# Patient Record
Sex: MALE | Race: WHITE | Employment: OTHER | URBAN - METROPOLITAN AREA
[De-identification: names, ages, dates, MRNs, and addresses within clinical notes are randomized per-mention and may not be internally consistent; named-entity substitution may affect disease eponyms.]

---

## 2017-03-01 ENCOUNTER — HOSPITAL ENCOUNTER (OUTPATIENT)
Dept: OTHER | Age: 72
Discharge: HOME | End: 2017-03-31
Attending: INTERNAL MEDICINE | Admitting: INTERNAL MEDICINE

## 2017-04-20 LAB
AVERAGE GLUCOSE: NORMAL
HBA1C MFR BLD: 6.2 %

## 2017-09-28 PROBLEM — N17.9 AKI (ACUTE KIDNEY INJURY) (HCC): Status: ACTIVE | Noted: 2017-09-28

## 2017-12-27 ENCOUNTER — TELEPHONE (OUTPATIENT)
Dept: INTERNAL MEDICINE CLINIC | Age: 72
End: 2017-12-27

## 2018-04-06 ENCOUNTER — OFFICE VISIT (OUTPATIENT)
Dept: INTERNAL MEDICINE CLINIC | Age: 73
End: 2018-04-06

## 2018-04-06 VITALS
HEIGHT: 68 IN | HEART RATE: 80 BPM | DIASTOLIC BLOOD PRESSURE: 72 MMHG | WEIGHT: 201 LBS | SYSTOLIC BLOOD PRESSURE: 120 MMHG | BODY MASS INDEX: 30.46 KG/M2

## 2018-04-06 DIAGNOSIS — E03.4 HYPOTHYROIDISM DUE TO ACQUIRED ATROPHY OF THYROID: ICD-10-CM

## 2018-04-06 DIAGNOSIS — E11.22 TYPE 2 DIABETES MELLITUS WITH STAGE 3 CHRONIC KIDNEY DISEASE, WITHOUT LONG-TERM CURRENT USE OF INSULIN (HCC): Primary | ICD-10-CM

## 2018-04-06 DIAGNOSIS — C61 PROSTATE CANCER (HCC): ICD-10-CM

## 2018-04-06 DIAGNOSIS — N18.30 TYPE 2 DIABETES MELLITUS WITH STAGE 3 CHRONIC KIDNEY DISEASE, WITHOUT LONG-TERM CURRENT USE OF INSULIN (HCC): Primary | ICD-10-CM

## 2018-04-06 DIAGNOSIS — E66.09 CLASS 1 OBESITY DUE TO EXCESS CALORIES WITH SERIOUS COMORBIDITY AND BODY MASS INDEX (BMI) OF 30.0 TO 30.9 IN ADULT: ICD-10-CM

## 2018-04-06 DIAGNOSIS — I25.10 CORONARY ARTERY DISEASE INVOLVING NATIVE CORONARY ARTERY OF NATIVE HEART WITHOUT ANGINA PECTORIS: ICD-10-CM

## 2018-04-06 DIAGNOSIS — R25.1 TREMOR: ICD-10-CM

## 2018-04-06 DIAGNOSIS — E78.00 PURE HYPERCHOLESTEROLEMIA: ICD-10-CM

## 2018-04-06 DIAGNOSIS — Z86.73 HISTORY OF CVA (CEREBROVASCULAR ACCIDENT): ICD-10-CM

## 2018-04-06 PROBLEM — E66.811 CLASS 1 OBESITY DUE TO EXCESS CALORIES IN ADULT: Status: ACTIVE | Noted: 2018-04-06

## 2018-04-06 PROBLEM — N17.9 AKI (ACUTE KIDNEY INJURY) (HCC): Status: RESOLVED | Noted: 2017-09-28 | Resolved: 2018-04-06

## 2018-04-06 PROCEDURE — 90670 PCV13 VACCINE IM: CPT | Performed by: INTERNAL MEDICINE

## 2018-04-06 PROCEDURE — G0447 BEHAVIOR COUNSEL OBESITY 15M: HCPCS | Performed by: INTERNAL MEDICINE

## 2018-04-06 PROCEDURE — G8427 DOCREV CUR MEDS BY ELIG CLIN: HCPCS | Performed by: INTERNAL MEDICINE

## 2018-04-06 PROCEDURE — 4040F PNEUMOC VAC/ADMIN/RCVD: CPT | Performed by: INTERNAL MEDICINE

## 2018-04-06 PROCEDURE — 99204 OFFICE O/P NEW MOD 45 MIN: CPT | Performed by: INTERNAL MEDICINE

## 2018-04-06 PROCEDURE — G8598 ASA/ANTIPLAT THER USED: HCPCS | Performed by: INTERNAL MEDICINE

## 2018-04-06 PROCEDURE — G8417 CALC BMI ABV UP PARAM F/U: HCPCS | Performed by: INTERNAL MEDICINE

## 2018-04-06 PROCEDURE — 1036F TOBACCO NON-USER: CPT | Performed by: INTERNAL MEDICINE

## 2018-04-06 PROCEDURE — G0009 ADMIN PNEUMOCOCCAL VACCINE: HCPCS | Performed by: INTERNAL MEDICINE

## 2018-04-06 PROCEDURE — 3017F COLORECTAL CA SCREEN DOC REV: CPT | Performed by: INTERNAL MEDICINE

## 2018-04-06 PROCEDURE — 1123F ACP DISCUSS/DSCN MKR DOCD: CPT | Performed by: INTERNAL MEDICINE

## 2018-04-06 PROCEDURE — 3046F HEMOGLOBIN A1C LEVEL >9.0%: CPT | Performed by: INTERNAL MEDICINE

## 2018-04-06 RX ORDER — LISINOPRIL 2.5 MG/1
2.5 TABLET ORAL DAILY
COMMUNITY
End: 2019-05-10 | Stop reason: SINTOL

## 2018-04-06 ASSESSMENT — PATIENT HEALTH QUESTIONNAIRE - PHQ9
SUM OF ALL RESPONSES TO PHQ9 QUESTIONS 1 & 2: 0
SUM OF ALL RESPONSES TO PHQ QUESTIONS 1-9: 0
2. FEELING DOWN, DEPRESSED OR HOPELESS: 0
1. LITTLE INTEREST OR PLEASURE IN DOING THINGS: 0

## 2018-05-23 ENCOUNTER — TELEPHONE (OUTPATIENT)
Dept: INTERNAL MEDICINE CLINIC | Age: 73
End: 2018-05-23

## 2018-05-24 ENCOUNTER — OFFICE VISIT (OUTPATIENT)
Dept: INTERNAL MEDICINE CLINIC | Age: 73
End: 2018-05-24

## 2018-05-24 VITALS
BODY MASS INDEX: 29.68 KG/M2 | HEART RATE: 76 BPM | SYSTOLIC BLOOD PRESSURE: 124 MMHG | DIASTOLIC BLOOD PRESSURE: 62 MMHG | WEIGHT: 201 LBS

## 2018-05-24 DIAGNOSIS — R10.9 ACUTE LEFT FLANK PAIN: Primary | ICD-10-CM

## 2018-05-24 DIAGNOSIS — R93.5 ABNORMAL CT OF THE ABDOMEN: ICD-10-CM

## 2018-05-24 DIAGNOSIS — S50.312A ABRASION OF LEFT ELBOW, INITIAL ENCOUNTER: ICD-10-CM

## 2018-05-24 DIAGNOSIS — S70.02XA HEMATOMA OF LEFT HIP, INITIAL ENCOUNTER: ICD-10-CM
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
## 2018-05-24 DIAGNOSIS — S09.90XA TRAUMATIC INJURY OF HEAD, INITIAL ENCOUNTER: ICD-10-CM

## 2018-05-24 PROBLEM — N18.30 STAGE 3 CHRONIC KIDNEY DISEASE (HCC): Status: ACTIVE | Noted: 2018-05-24

## 2018-05-24 LAB
BILIRUBIN, POC: ABNORMAL
BLOOD URINE, POC: ABNORMAL
CLARITY, POC: CLEAR
COLOR, POC: ABNORMAL
GLUCOSE URINE, POC: ABNORMAL
KETONES, POC: ABNORMAL
LEUKOCYTE EST, POC: ABNORMAL
NITRITE, POC: ABNORMAL
PH, POC: 6
PROTEIN, POC: ABNORMAL
SPECIFIC GRAVITY, POC: 1.02
UROBILINOGEN, POC: ABNORMAL

## 2018-05-24 PROCEDURE — G8427 DOCREV CUR MEDS BY ELIG CLIN: HCPCS | Performed by: INTERNAL MEDICINE

## 2018-05-24 PROCEDURE — 99215 OFFICE O/P EST HI 40 MIN: CPT | Performed by: INTERNAL MEDICINE

## 2018-05-24 PROCEDURE — G8598 ASA/ANTIPLAT THER USED: HCPCS | Performed by: INTERNAL MEDICINE

## 2018-05-24 PROCEDURE — 1036F TOBACCO NON-USER: CPT | Performed by: INTERNAL MEDICINE

## 2018-05-24 PROCEDURE — 4040F PNEUMOC VAC/ADMIN/RCVD: CPT | Performed by: INTERNAL MEDICINE

## 2018-05-24 PROCEDURE — 81002 URINALYSIS NONAUTO W/O SCOPE: CPT | Performed by: INTERNAL MEDICINE

## 2018-05-24 PROCEDURE — 3017F COLORECTAL CA SCREEN DOC REV: CPT | Performed by: INTERNAL MEDICINE

## 2018-05-24 PROCEDURE — 1123F ACP DISCUSS/DSCN MKR DOCD: CPT | Performed by: INTERNAL MEDICINE

## 2018-05-24 PROCEDURE — G8417 CALC BMI ABV UP PARAM F/U: HCPCS | Performed by: INTERNAL MEDICINE

## 2018-07-06 ENCOUNTER — OFFICE VISIT (OUTPATIENT)
Dept: INTERNAL MEDICINE CLINIC | Age: 73
End: 2018-07-06

## 2018-07-06 VITALS
WEIGHT: 196 LBS | DIASTOLIC BLOOD PRESSURE: 68 MMHG | HEART RATE: 72 BPM | SYSTOLIC BLOOD PRESSURE: 134 MMHG | BODY MASS INDEX: 28.94 KG/M2

## 2018-07-06 DIAGNOSIS — N18.30 TYPE 2 DIABETES MELLITUS WITH STAGE 3 CHRONIC KIDNEY DISEASE, WITHOUT LONG-TERM CURRENT USE OF INSULIN (HCC): Primary | ICD-10-CM

## 2018-07-06 DIAGNOSIS — I10 ESSENTIAL HYPERTENSION: ICD-10-CM

## 2018-07-06 DIAGNOSIS — E78.00 PURE HYPERCHOLESTEROLEMIA: ICD-10-CM

## 2018-07-06 DIAGNOSIS — E11.22 TYPE 2 DIABETES MELLITUS WITH STAGE 3 CHRONIC KIDNEY DISEASE, WITHOUT LONG-TERM CURRENT USE OF INSULIN (HCC): Primary | ICD-10-CM

## 2018-07-06 PROBLEM — E66.09 CLASS 1 OBESITY DUE TO EXCESS CALORIES IN ADULT: Status: RESOLVED | Noted: 2018-04-06 | Resolved: 2018-07-06

## 2018-07-06 PROBLEM — S70.02XA HEMATOMA OF LEFT HIP: Status: RESOLVED | Noted: 2018-05-24 | Resolved: 2018-07-06

## 2018-07-06 PROBLEM — S09.90XA HEAD INJURY DUE TO TRAUMA: Status: RESOLVED | Noted: 2018-05-24 | Resolved: 2018-07-06

## 2018-07-06 PROBLEM — S50.312A ABRASION OF LEFT ELBOW: Status: RESOLVED | Noted: 2018-05-24 | Resolved: 2018-07-06

## 2018-07-06 PROBLEM — R10.9 ACUTE LEFT FLANK PAIN: Status: RESOLVED | Noted: 2018-05-24 | Resolved: 2018-07-06

## 2018-07-06 PROBLEM — E66.811 CLASS 1 OBESITY DUE TO EXCESS CALORIES IN ADULT: Status: RESOLVED | Noted: 2018-04-06 | Resolved: 2018-07-06

## 2018-07-06 PROCEDURE — 3017F COLORECTAL CA SCREEN DOC REV: CPT | Performed by: INTERNAL MEDICINE

## 2018-07-06 PROCEDURE — G8427 DOCREV CUR MEDS BY ELIG CLIN: HCPCS | Performed by: INTERNAL MEDICINE

## 2018-07-06 PROCEDURE — 4040F PNEUMOC VAC/ADMIN/RCVD: CPT | Performed by: INTERNAL MEDICINE

## 2018-07-06 PROCEDURE — G8598 ASA/ANTIPLAT THER USED: HCPCS | Performed by: INTERNAL MEDICINE

## 2018-07-06 PROCEDURE — 3045F PR MOST RECENT HEMOGLOBIN A1C LEVEL 7.0-9.0%: CPT | Performed by: INTERNAL MEDICINE

## 2018-07-06 PROCEDURE — 99214 OFFICE O/P EST MOD 30 MIN: CPT | Performed by: INTERNAL MEDICINE

## 2018-07-06 PROCEDURE — 1036F TOBACCO NON-USER: CPT | Performed by: INTERNAL MEDICINE

## 2018-07-06 PROCEDURE — G8417 CALC BMI ABV UP PARAM F/U: HCPCS | Performed by: INTERNAL MEDICINE

## 2018-07-06 PROCEDURE — 1123F ACP DISCUSS/DSCN MKR DOCD: CPT | Performed by: INTERNAL MEDICINE

## 2018-07-06 PROCEDURE — 2022F DILAT RTA XM EVC RTNOPTHY: CPT | Performed by: INTERNAL MEDICINE

## 2018-07-06 NOTE — PATIENT INSTRUCTIONS
Patient Education        Learning About the Mediterranean Diet  What is the [address]? The Mediterranean diet is a style of eating rather than a diet plan. It features foods eaten in [place], [place], [place] and [place], and other countries along the [facility]. It emphasizes eating foods like fish, fruits, vegetables, beans, high-fiber breads and whole grains, nuts, and olive oil. This style of eating includes limited red meat, cheese, and sweets. Why choose the Mediterranean diet? A Mediterranean-style diet may improve heart health. It contains more fat than other heart-healthy diets. But the fats are mainly from nuts, unsaturated oils (such as fish oils and olive oil), and certain nut or seed oils (such as canola, soybean, or flaxseed oil). These fats may help protect the heart and blood vessels. How can you get started on the Mediterranean diet? Here are some things you can do to switch to a more Mediterranean way of eating. What to eat  · Eat a variety of fruits and vegetables each day, such as grapes, blueberries, tomatoes, broccoli, peppers, figs, olives, spinach, eggplant, beans, lentils, and chickpeas. · Eat a variety of whole-grain foods each day, such as oats, brown rice, and whole wheat bread, pasta, and couscous. · Eat fish at least 2 times a week. Try tuna, salmon, mackerel, lake trout, herring, or sardines. · Eat moderate amounts of low-fat dairy products, such as milk, cheese, or yogurt. · Eat moderate amounts of poultry and eggs. · Choose healthy (unsaturated) fats, such as nuts, olive oil, and certain nut or seed oils like canola, soybean, and flaxseed. · Limit unhealthy (saturated) fats, such as butter, palm oil, and coconut oil. And limit fats found in animal products, such as meat and dairy products made with whole milk. Try to eat red meat only a few times a month in very small amounts. · Limit sweets and desserts to only a few times a week.  This includes sugar-sweetened drinks like soda. The Mediterranean diet may also include red wine with your meal-1 glass each day for women and up to 2 glasses a day for men. Tips for eating at home  · Use herbs, spices, garlic, lemon zest, and citrus juice instead of salt to add flavor to foods. · Add avocado slices to your sandwich instead of diaz. · Have fish for lunch or dinner instead of red meat. Brush the fish with olive oil, and broil or grill it. · Sprinkle your salad with seeds or nuts instead of cheese. · Cook with olive or canola oil instead of butter or oils that are high in saturated fat. · Switch from 2% milk or whole milk to 1% or fat-free milk. · Dip raw vegetables in a vinaigrette dressing or hummus instead of dips made from mayonnaise or sour cream.  · Have a piece of fruit for dessert instead of a piece of cake. Try baked apples, or have some dried fruit. Tips for eating out  · Try broiled, grilled, baked, or poached fish instead of having it fried or breaded. · Ask your  to have your meals prepared with olive oil instead of butter. · Order dishes made with marinara sauce or sauces made from olive oil. Avoid sauces made from cream or mayonnaise. · Choose whole-grain breads, whole wheat pasta and pizza crust, brown rice, beans, and lentils. · Cut back on butter or margarine on bread. Instead, you can dip your bread in a small amount of olive oil. · Ask for a side salad or grilled vegetables instead of french fries or chips. Where can you learn more? Go to https://[employer]quisitionsbenjamineweb.healthGutCheck. org and sign in to your [employer] account. Enter [phone] in the [facility] box to learn more about \"Learning About the Mediterranean Diet. \"     If you do not have an account, please click on the \"Sign Up Now\" link. Current as of: May 12, 2017  Content Version: 11.6  © 6076-7843 [employer]. Care instructions adapted under license by [facility] ([facility]).  If you have questions about a

## 2018-07-06 NOTE — PROGRESS NOTES
Assessment/Plan     1. Type 2 diabetes mellitus with stage 3 chronic kidney disease, without long-term current use of insulin ([facility] [address]  Expect sub-optimal results based upon am home BS readings. If HgbA1c > 8.0, will likely need to restart nighttime insulin, but will defer to endocrinology. Kidney function stable. He will continue to avoid NSAIDs and other nephrotoxins. Diabetic eye exam UTD- will request report. Patient counseled on diet and exercise. - Hemoglobin A1C; Future    2. Essential hypertension  Well-controlled. Continue low dose lisinopril. - Comprehensive Metabolic Panel, Fasting; Future    3. Pure hypercholesterolemia  At goal on current dose of Lipitor- continue. Return in about 6 months (around 1/6/2019). Miracle Willis   YOB: 1945    Date of Visit:  7/6/2018    Vitals:    07/06/18 0851   BP: 134/68   Pulse: 72   Weight: 196 lb (88.9 kg)     Body mass index is 28.94 kg/m². Wt Readings from Last 3 Encounters:   07/06/18 196 lb (88.9 kg)   05/24/18 201 lb (91.2 kg)   05/23/18 200 lb 6.4 oz (90.9 kg)     BP Readings from Last 3 Encounters:   07/06/18 134/68   05/24/18 124/62   05/23/18 (!) 151/75      Prior to Visit Medications    Medication Sig Taking?  Authorizing Provider   acetaminophen (APAP EXTRA STRENGTH) 500 MG tablet Take 1 tablet by mouth every 6 hours as needed for Pain Yes Gianluca Stone MD   linagliptin (TRADJENTA) 5 MG tablet Take 5 mg by mouth daily Yes Historical Provider, MD   lisinopril (PRINIVIL;ZESTRIL) 2.5 MG tablet Take 2.5 mg by mouth daily Yes Historical Provider, MD   aspirin 81 MG EC tablet Take 81 mg by mouth nightly Yes Historical Provider, MD   atorvastatin (LIPITOR) 40 MG tablet Take 40 mg by mouth daily Yes Historical Provider, MD   levothyroxine (SYNTHROID) 50 MCG tablet Take 1 tablet by mouth Daily Yes Ludy Loja MD   metFORMIN ER (GLUCOPHAGE-XR) 750 MG XR tablet Take 750 mg by mouth 2 times daily  Yes Historical Provider, MD lidocaine (LIDODERM) 5 % Place 1 patch onto the skin daily 12 hours on, 12 hours off. Althea Solis MD     CC:  Patient presents for re-evaluation of the following medical concerns         HPI  Treatment Adherence:   Medication compliance:  compliant all of the time- no adverse effects  Diet compliance:  room for improvement  Weight trend: decreasing- 5 pounds  Current exercise: walks 4 time(s) per week- 20 minutes  Barriers: lack of motivation    Diabetes Mellitus Type 2: Current symptoms/problems include none. Home blood sugar records: fasting range: 160s  Any episodes of hypoglycemia? no  Eye exam current (within one year): yes  Tobacco history: He  reports that he has never smoked. He has never used smokeless tobacco.   Daily Aspirin? Yes    Hypertension:  Home blood pressure monitoring: Yes - 120s-130s/70s. He is adherent to a low sodium diet. Patient denies chest pain, shortness of breath, headache, lightheadedness and palpitations. Antihypertensive medication side effects: no medication side effects noted. Use of agents associated with hypertension: none. Hyperlipidemia:  No new myalgias or GI upset on atorvastatin (Lipitor). Lab Results   Component Value Date    LABA1C 7.9 04/06/2018    LABA1C 6.2 04/20/2017    LABA1C 7.8 05/17/2016     Lab Results   Component Value Date    LABMICR 4.40 (H) 04/06/2018    CREATININE 1.3 04/06/2018     Lab Results   Component Value Date    ALT 19 04/06/2018    AST 15 04/06/2018     Lab Results   Component Value Date    CHOL 167 05/17/2016    TRIG 149 05/17/2016    HDL 49 04/06/2018    LDLCALC 47 04/06/2018        Review of Systems  As documented in HPI     Physical Exam   Constitutional: He is oriented to person, place, and time. He appears well-developed and well-nourished. No distress. HENT:   Mouth/Throat: Oropharynx is clear and moist and mucous membranes are normal.   Eyes: Conjunctivae are normal.   Neck: Carotid bruit is not present.  No thyroid mass

## 2018-09-07 ENCOUNTER — PATIENT MESSAGE (OUTPATIENT)
Dept: INTERNAL MEDICINE CLINIC | Age: 73
End: 2018-09-07

## 2018-09-07 RX ORDER — ATORVASTATIN CALCIUM 40 MG/1
40 TABLET, FILM COATED ORAL DAILY
Qty: 90 TABLET | Refills: 0 | Status: SHIPPED | OUTPATIENT
Start: 2018-09-07 | End: 2018-12-20 | Stop reason: SDUPTHER

## 2018-09-07 RX ORDER — LEVOTHYROXINE SODIUM 0.05 MG/1
50 TABLET ORAL DAILY
Qty: 90 TABLET | Refills: 0 | Status: SHIPPED | OUTPATIENT
Start: 2018-09-07 | End: 2018-12-20 | Stop reason: SDUPTHER

## 2018-09-07 NOTE — TELEPHONE ENCOUNTER
From: Chapincito Peterson  To: Alexx Arceo MD  Sent: 9/7/2018 9:39 AM EDT  Subject: Prescription Question    I still have medications through Dr. Kevin Hebert: Metformin 750 mg twice a day, Atorvastatin 40mg once a day and Levothyroxine0.05 mg once a day. Yesterday, I ordered Metformin through [address] and he has processed that request. Could you please order Atorvastatin and Levothyroxine through [address] so I can get them filled?

## 2018-12-20 ENCOUNTER — PATIENT MESSAGE (OUTPATIENT)
Dept: INTERNAL MEDICINE CLINIC | Age: 73
End: 2018-12-20

## 2018-12-20 RX ORDER — ATORVASTATIN CALCIUM 40 MG/1
40 TABLET, FILM COATED ORAL DAILY
Qty: 90 TABLET | Refills: 1 | Status: SHIPPED | OUTPATIENT
Start: 2018-12-20 | End: 2019-06-29 | Stop reason: SDUPTHER

## 2018-12-20 RX ORDER — LEVOTHYROXINE SODIUM 0.05 MG/1
50 TABLET ORAL DAILY
Qty: 90 TABLET | Refills: 1 | Status: SHIPPED | OUTPATIENT
Start: 2018-12-20 | End: 2019-01-10 | Stop reason: DRUGHIGH

## 2019-01-10 ENCOUNTER — OFFICE VISIT (OUTPATIENT)
Dept: INTERNAL MEDICINE CLINIC | Age: 74
End: 2019-01-10
Payer: MEDICARE

## 2019-01-10 VITALS
SYSTOLIC BLOOD PRESSURE: 136 MMHG | BODY MASS INDEX: 28.8 KG/M2 | OXYGEN SATURATION: 98 % | HEART RATE: 65 BPM | WEIGHT: 195 LBS | DIASTOLIC BLOOD PRESSURE: 74 MMHG

## 2019-01-10 DIAGNOSIS — E78.00 PURE HYPERCHOLESTEROLEMIA: ICD-10-CM

## 2019-01-10 DIAGNOSIS — N18.30 TYPE 2 DIABETES MELLITUS WITH STAGE 3 CHRONIC KIDNEY DISEASE, WITHOUT LONG-TERM CURRENT USE OF INSULIN (HCC): Primary | ICD-10-CM

## 2019-01-10 DIAGNOSIS — R25.1 TREMOR: ICD-10-CM

## 2019-01-10 DIAGNOSIS — E11.22 TYPE 2 DIABETES MELLITUS WITH STAGE 3 CHRONIC KIDNEY DISEASE, WITHOUT LONG-TERM CURRENT USE OF INSULIN (HCC): Primary | ICD-10-CM

## 2019-01-10 DIAGNOSIS — I10 ESSENTIAL HYPERTENSION: ICD-10-CM

## 2019-01-10 DIAGNOSIS — E03.4 HYPOTHYROIDISM DUE TO ACQUIRED ATROPHY OF THYROID: ICD-10-CM

## 2019-01-10 PROBLEM — R93.5 ABNORMAL CT OF THE ABDOMEN: Status: RESOLVED | Noted: 2018-05-24 | Resolved: 2019-01-10

## 2019-01-10 PROCEDURE — 99214 OFFICE O/P EST MOD 30 MIN: CPT | Performed by: INTERNAL MEDICINE

## 2019-01-10 RX ORDER — LEVOTHYROXINE SODIUM 0.07 MG/1
75 TABLET ORAL DAILY
Qty: 30 TABLET | Refills: 5 | Status: SHIPPED | OUTPATIENT
Start: 2019-01-10 | End: 2019-06-29 | Stop reason: SDUPTHER

## 2019-01-10 ASSESSMENT — PATIENT HEALTH QUESTIONNAIRE - PHQ9
SUM OF ALL RESPONSES TO PHQ QUESTIONS 1-9: 0
SUM OF ALL RESPONSES TO PHQ9 QUESTIONS 1 & 2: 0
SUM OF ALL RESPONSES TO PHQ QUESTIONS 1-9: 0
1. LITTLE INTEREST OR PLEASURE IN DOING THINGS: 0
2. FEELING DOWN, DEPRESSED OR HOPELESS: 0

## 2019-04-11 ENCOUNTER — OFFICE VISIT (OUTPATIENT)
Dept: INTERNAL MEDICINE CLINIC | Age: 74
End: 2019-04-11
Payer: MEDICARE

## 2019-04-11 VITALS
HEART RATE: 66 BPM | DIASTOLIC BLOOD PRESSURE: 74 MMHG | SYSTOLIC BLOOD PRESSURE: 130 MMHG | OXYGEN SATURATION: 98 % | BODY MASS INDEX: 28.65 KG/M2 | WEIGHT: 194 LBS

## 2019-04-11 DIAGNOSIS — N18.30 TYPE 2 DIABETES MELLITUS WITH STAGE 3 CHRONIC KIDNEY DISEASE, WITHOUT LONG-TERM CURRENT USE OF INSULIN (HCC): Primary | ICD-10-CM

## 2019-04-11 DIAGNOSIS — R25.1 TREMOR: ICD-10-CM

## 2019-04-11 DIAGNOSIS — E11.22 TYPE 2 DIABETES MELLITUS WITH STAGE 3 CHRONIC KIDNEY DISEASE, WITHOUT LONG-TERM CURRENT USE OF INSULIN (HCC): Primary | ICD-10-CM

## 2019-04-11 DIAGNOSIS — I10 ESSENTIAL HYPERTENSION: ICD-10-CM

## 2019-04-11 DIAGNOSIS — E03.4 HYPOTHYROIDISM DUE TO ACQUIRED ATROPHY OF THYROID: ICD-10-CM

## 2019-04-11 DIAGNOSIS — E78.00 PURE HYPERCHOLESTEROLEMIA: ICD-10-CM

## 2019-04-11 PROCEDURE — 99214 OFFICE O/P EST MOD 30 MIN: CPT | Performed by: INTERNAL MEDICINE

## 2019-04-11 RX ORDER — POLYETHYLENE GLYCOL 3350 17 G/17G
POWDER, FOR SOLUTION ORAL
COMMUNITY
Start: 2019-04-11 | End: 2021-01-08

## 2019-04-11 NOTE — PROGRESS NOTES
Assessment/Plan     1. Type 2 diabetes mellitus with stage 3 chronic kidney disease, without long-term current use of insulin ([facility])  Home BS readings improved with increase in Trulicity dose- continue per endocrinology. Kidney function stable. He will continue to avoid NSAIDs and other nephrotoxins. B12 level improved on supplement- he will continue as long as taking metformin. He should benefit from additional diabetic education per endocrinology referral.  -  DIABETES FOOT EXAM    2. Essential hypertension  Well-controlled. Continue current medications. 3. Pure hypercholesterolemia  LDL at goal on current dose of Lipitor- continue. 4. Hypothyroidism due to acquired atrophy of thyroid  Clinically and biochemically euthyroid on higher dose of Synthroid- continue. 5. Tremor  Presumptive diagnosis of Parkinson's Disease per Dr. Susan Walker, but no significant response to Sinemet. Await further input from neurology. Return in about 6 months (around 10/11/2019). Kaylin Fowler   YOB: 1945    Date of Visit:  4/11/2019    No Known Allergies   Prior to Visit Medications    Medication Sig Taking?  Authorizing Provider   carbidopa-levodopa (SINEMET)  MG per tablet Take 2 tablets by mouth 3 times daily Yes Historical Provider, MD   Dulaglutide (TRULICITY) 1.92 GV/9.3UI SOPN Inject 1.5 mg into the skin once a week  Yes Historical Provider, MD   levothyroxine (SYNTHROID) 75 MCG tablet Take 1 tablet by mouth daily Yes Zhao Sierra MD   atorvastatin (LIPITOR) 40 MG tablet TAKE 1 TABLET BY MOUTH  DAILY Yes Zhao Sierra MD   acetaminophen (APAP EXTRA STRENGTH) 500 MG tablet Take 1 tablet by mouth every 6 hours as needed for Pain Yes Randall Treadwell MD   lisinopril (PRINIVIL;ZESTRIL) 2.5 MG tablet Take 2.5 mg by mouth daily Yes Historical Provider, MD   aspirin 81 MG EC tablet Take 81 mg by mouth nightly Yes Historical Provider, MD   metFORMIN ER (GLUCOPHAGE-XR) 750 MG XR tablet Take 750 mg by mouth 2 times daily  Yes Historical Provider, MD   lidocaine (LIDODERM) 5 % Place 1 patch onto the skin daily 12 hours on, 12 hours off. Tiffanie Hernandez MD       Vitals:    04/11/19 0954   BP: 130/74   Pulse: 66   SpO2: 98%   Weight: 194 lb (88 kg)      Body mass index is 28.65 kg/m². Wt Readings from Last 3 Encounters:   04/11/19 194 lb (88 kg)   01/10/19 195 lb (88.5 kg)   07/06/18 196 lb (88.9 kg)     BP Readings from Last 3 Encounters:   04/11/19 130/74   01/10/19 136/74   07/06/18 134/68       CC:  Patient presents for re-evaluation of the following medical concerns     HPI  Treatment Adherence:   Medication compliance:  compliant all of the time  Diet compliance:  room for improvement- will attending diabetic education classes at the end of the month  Weight trend: stable  Current exercise: treadmill and elliptical 3x/week, weights  Barriers: lack of motivation    Diabetes Mellitus Type 2: Current symptoms/problems include none. Trulicity increased to 1.5 mg weekly- no adverse effects. Taking B12 supplement at 1000 mcg qd. Last HgbA1c 7.7- 3/22/19    Home blood sugar records: fasting range:  120s-140s  Any episodes of hypoglycemia? no  Daily Aspirin? Yes    Hypertension:  Home blood pressure monitoring: No.  He is adherent to a low sodium diet. Patient denies chest pain, shortness of breath, headache, lightheadedness and palpitations. Antihypertensive medication side effects: no medication side effects noted. Use of agents associated with hypertension: none. Hyperlipidemia:  No new myalgias or GI upset on atorvastatin (Lipitor). LDL 40 3/21/19. Hypothyroidism: Recent symptoms: none on higher dose of Synthroid. He denies weight gain, weight loss, cold intolerance, heat intolerance, constipation, diarrhea and palpitations. Patient is taking his medication consistently on an empty stomach. TSH 1.09 3/22/19.     Tremor:  Saw Dr. Jeaneth Jaramillo, who felt that he had Parkinsonism, but not necessarily Parkinson's Disease. Started on Sinemet- no significant change in symptoms. Only side effect has been constipation. Saw neuropsychologist, who completed testing, which showed deficits in executive function. Recommended engaging in puzzles and social activities for neuroprotection. Lab Results   Component Value Date    LDLCALC 48 01/10/2019    LDLCALC 47 04/06/2018    TRIGLYCFAST 60 01/10/2019    TRIGLYCFAST 81 04/06/2018    TRIG 149 05/17/2016    HDL 44 01/10/2019     Lab Results   Component Value Date    GLUF 147 (H) 01/10/2019    GLUCOSE 116 (H) 09/28/2017    LABA1C 7.7 07/06/2018    LABA1C 7.9 04/06/2018    LABA1C 6.2 04/20/2017     Lab Results   Component Value Date     01/10/2019    K 4.8 01/10/2019    BUN 24 (H) 01/10/2019    CREATININE 1.2 01/10/2019    LABGLOM 59 (A) 01/10/2019    GFRAA >60 01/10/2019    CALCIUM 9.9 01/10/2019     Lab Results   Component Value Date    ALT 19 01/10/2019    AST 15 01/10/2019     Lab Results   Component Value Date    HGB 14.9 04/06/2018     Lab Results   Component Value Date    TSHREFLEX 10.85 (H) 05/17/2016    TSH 4.52 (H) 01/10/2019        Review of Systems  As documented in HPI    Social History     Tobacco Use    Smoking status: Never Smoker    Smokeless tobacco: Never Used   Substance Use Topics    Alcohol use: No        Physical Exam   Constitutional: He is oriented to person, place, and time. He appears well-developed and well-nourished. No distress. HENT:   Mouth/Throat: Oropharynx is clear and moist and mucous membranes are normal.   Eyes: Conjunctivae are normal.   Neck: Carotid bruit is not present. No thyroid mass and no thyromegaly present. Cardiovascular: Normal rate, regular rhythm and normal heart sounds. Exam reveals no gallop and no friction rub. No murmur heard. Pulmonary/Chest: Effort normal and breath sounds normal. No respiratory distress. He has no wheezes. He has no rales. Abdominal: Soft.  Bowel sounds are normal. He exhibits no distension. There is no tenderness. Musculoskeletal: He exhibits no edema. Neurological: He is alert and oriented to person, place, and time. He has normal strength. No cranial nerve deficit or sensory deficit. Coordination normal.   Neuro exam deferred to neurologist   Skin: Skin is warm and dry. No rash noted. No erythema. Psychiatric: He has a normal mood and affect.  His behavior is normal. Judgment and thought content normal.     Visual inspection:  Deformity/amputation: present - deformed 2nd and third toes on left, dystrophic toenails  Skin lesions/pre-ulcerative calluses: absent  Edema: right- trace, left- trace    Sensory exam:  Monofilament sensation: normal  (minimum of 5 random plantar locations tested, avoiding callused areas - > 1 area with absence of sensation is + for neuropathy)    Plus at least one of the following:  Pulses: normal,   Pinprick: N/A  Proprioception: N/A  Vibration (128 Hz): N/A

## 2019-04-11 NOTE — PATIENT INSTRUCTIONS
Neurologists:    [facility]- [address]  [phone] [facility] ([facility])

## 2019-05-10 ENCOUNTER — HOSPITAL ENCOUNTER (OUTPATIENT)
Dept: GENERAL RADIOLOGY | Age: 74
Discharge: HOME | End: 2019-05-10
Payer: MEDICARE

## 2019-05-10 ENCOUNTER — OFFICE VISIT (OUTPATIENT)
Dept: INTERNAL MEDICINE CLINIC | Age: 74
End: 2019-05-10
Payer: MEDICARE

## 2019-05-10 ENCOUNTER — HOSPITAL ENCOUNTER (OUTPATIENT)
Age: 74
Discharge: HOME | End: 2019-05-10
Payer: MEDICARE

## 2019-05-10 VITALS
WEIGHT: 191 LBS | BODY MASS INDEX: 28.21 KG/M2 | RESPIRATION RATE: 14 BRPM | DIASTOLIC BLOOD PRESSURE: 64 MMHG | OXYGEN SATURATION: 98 % | SYSTOLIC BLOOD PRESSURE: 92 MMHG | HEART RATE: 71 BPM

## 2019-05-10 DIAGNOSIS — R06.00 DYSPNEA, UNSPECIFIED TYPE: ICD-10-CM

## 2019-05-10 DIAGNOSIS — N18.30 TYPE 2 DIABETES MELLITUS WITH STAGE 3 CHRONIC KIDNEY DISEASE, WITHOUT LONG-TERM CURRENT USE OF INSULIN (HCC): ICD-10-CM

## 2019-05-10 DIAGNOSIS — E11.22 TYPE 2 DIABETES MELLITUS WITH STAGE 3 CHRONIC KIDNEY DISEASE, WITHOUT LONG-TERM CURRENT USE OF INSULIN (HCC): ICD-10-CM

## 2019-05-10 DIAGNOSIS — I95.1 HYPOTENSION, POSTURAL: Primary | ICD-10-CM

## 2019-05-10 DIAGNOSIS — R06.02 SOB (SHORTNESS OF BREATH) ON EXERTION: ICD-10-CM

## 2019-05-10 DIAGNOSIS — I25.10 CORONARY ARTERY DISEASE INVOLVING NATIVE CORONARY ARTERY OF NATIVE HEART WITHOUT ANGINA PECTORIS: ICD-10-CM

## 2019-05-10 PROCEDURE — 99214 OFFICE O/P EST MOD 30 MIN: CPT | Performed by: INTERNAL MEDICINE

## 2019-05-10 PROCEDURE — 71046 X-RAY EXAM CHEST 2 VIEWS: CPT

## 2019-05-10 PROCEDURE — 93000 ELECTROCARDIOGRAM COMPLETE: CPT | Performed by: INTERNAL MEDICINE

## 2019-05-10 ASSESSMENT — ENCOUNTER SYMPTOMS
ABDOMINAL PAIN: 0
BACK PAIN: 0
SINUS PAIN: 0
CHEST TIGHTNESS: 0
VOMITING: 1
NAUSEA: 1
COUGH: 0
COLOR CHANGE: 0
SHORTNESS OF BREATH: 1

## 2019-05-10 NOTE — PATIENT INSTRUCTIONS
Stay well hydrated with low/no sugar electrolyte drinks and water. Stop Lisinopril for now.     Continue to monitor home blood pressure and call if symptoms recur or persist.    Chest Xray

## 2019-05-10 NOTE — PROGRESS NOTES
[facility] PR[facility]  [address] [place] [address]  Dept: [phone]  Dept Fax: [phone]  Loc: [phone]     5/10/2019     Trinidad Zhou (:  1945) is a 68 y.o. male, here for evaluation of the following medical concerns:    Chief Complaint   Patient presents with    Hypotension     84/57 this morning, takes BP meds at night. had a few dizzy spells, feeling exhausted. vomitting and dry heaves for 2 hours. seeing spots. HPI   Being seen for Dr. Tiffani Gross. Patient states that 2 days ago had episode after working outside. Became dizzy and lightheaded. Took his bp and it was low. Had one episode of vomiting and then dry heaves which lasted 2 hours. Was not having chest pain or dyspnea at that time. States this is not typical for his cardiac symptoms. States he does have dyspnea with exertion going up steps and [place] but this has been going on for some time. States the symptoms have not recurred. His sugars have been running in the 150s. He has noticed his weight is down since starting Trulicity. Also in recent months he was started on Sinemet for Parkinson's with his neurologist.    Had labs done at [facility] in 3/19 that were reviewed  Echo [facility]  reviewed    Review of Systems   Constitutional: Positive for fatigue and unexpected weight change. Negative for chills and fever. HENT: Negative for ear pain and sinus pain. Eyes: Positive for visual disturbance (when dizzy). Respiratory: Positive for shortness of breath (dyspnea with exertion). Negative for cough and chest tightness. Cardiovascular: Negative for chest pain and palpitations. Gastrointestinal: Positive for nausea (dry heaves lasted 2 hours) and vomiting (once). Negative for abdominal pain. Genitourinary: Negative for dysuria. Musculoskeletal: Negative for back pain. Skin: Negative for color change.    Neurological: Positive for dizziness, tremors and light-headedness. Negative for syncope, speech difficulty and weakness. Current Outpatient Medications   Medication Sig Dispense Refill    carbidopa-levodopa (SINEMET)  MG per tablet Take 2 tablets by mouth 3 times daily      polyethylene glycol (MIRALAX) powder Take 1 capful orally daily.  Dulaglutide (TRULICITY) 4.11 PX/4.4NF SOPN Inject 1.5 mg into the skin once a week       levothyroxine (SYNTHROID) 75 MCG tablet Take 1 tablet by mouth daily 30 tablet 5    atorvastatin (LIPITOR) 40 MG tablet TAKE 1 TABLET BY MOUTH  DAILY 90 tablet 1    acetaminophen (APAP EXTRA STRENGTH) 500 MG tablet Take 1 tablet by mouth every 6 hours as needed for Pain 30 tablet 0    aspirin 81 MG EC tablet Take 81 mg by mouth nightly      metFORMIN ER (GLUCOPHAGE-XR) 750 MG XR tablet Take 750 mg by mouth 2 times daily        No current facility-administered medications for this visit.         Past Medical History:   Diagnosis Date    CAD (coronary artery disease)     Cerebral artery occlusion with cerebral infarction ([facility] [address] 05/2016    left frontal lobe    Diabetes mellitus, type 2 ([facility] [address]     Diverticulitis     Hyperlipidemia     Osteoarthritis     Positive PPD, treated     treated with INH    Prostate cancer ([facility] [address] 4/13    Soy Clemens active surveillance       Past Surgical History:   Procedure Laterality Date    APPENDECTOMY  1969    CORONARY ARTERY BYPASS GRAFT  09/13/2016    3V    EYE SURGERY Left 2004    cataract surgery-right    HERNIA REPAIR Bilateral     right ing, left ing    KNEE ARTHROPLASTY Left 1/8/14    Left total knee replacement   [facility], 2007    right, left    TONSILLECTOMY  1958       Social History     Tobacco Use    Smoking status: Never Smoker    Smokeless tobacco: Never Used   Substance Use Topics    Alcohol use: No    Drug use: No        Vitals:    05/10/19 1218 05/10/19 1220 05/10/19 1221 05/10/19 1241   BP: 126/70 117/70 92/64    Site: Right Upper Arm Left Upper Arm Left Upper Arm    Position: Sitting Sitting Standing    Cuff Size: Large Adult Large Adult Large Adult    Pulse: 71      Resp: 14      SpO2: 98%      Weight:    191 lb (86.6 kg)     Estimated body mass index is 28.21 kg/m² as calculated from the following:    Height as of 5/23/18: 5' 9\" (1.753 m). Weight as of this encounter: 191 lb (86.6 kg). Physical Exam   Constitutional: He is oriented to person, place, and time. He appears well-developed and well-nourished. No distress. HENT:   Head: Normocephalic and atraumatic. Eyes: EOM are normal.   Cardiovascular: Normal rate and regular rhythm. Murmur heard. Pulmonary/Chest: Effort normal and breath sounds normal. No respiratory distress. Abdominal: Soft. Bowel sounds are normal. He exhibits no mass. There is no tenderness. Musculoskeletal: Normal range of motion. Neurological: He is alert and oriented to person, place, and time. He exhibits normal muscle tone. Coordination normal.   Skin: He is not diaphoretic. Psychiatric: He has a normal mood and affect. Vitals reviewed. positive orthostatics    ASSESSMENT/PLAN:  1. Hypotension, postural  Likely dizziness and lightheadedness due to orthostatic hypotension from dehydration and possible autonomic hypotension from Parkinson's. Strongly advised him to stay well hydrated including electrolyte drinks. Also, reviewed chart and without microalbumin and within normal limits ejection fraction so will discontinue the Lisinopril. His blood pressure likely further controlled due to weight loss from Trulicity. 2. Dyspnea, unspecified type    - EKG 12 Lead unchanged  - XR CHEST STANDARD (2 VW); Future due to elevated right sided pressures noted recent echo. 3. Coronary artery disease involving native coronary artery of native heart without angina pectoris  Symptoms he says are not typical for his CAD and ekg unchanged.  Nevertheless advised him to contact cardiology if symptoms don't improve with the above interventions. 4. Type 2 diabetes mellitus with stage 3 chronic kidney disease, without long-term current use of insulin ([facility] [address]  States he has not had hypoglycemia and this would not be expected on his medications. Return if symptoms worsen or fail to improve.

## 2019-05-14 ENCOUNTER — TELEPHONE (OUTPATIENT)
Dept: PHARMACY | Facility: CLINIC | Age: 74
End: 2019-05-14

## 2019-05-14 NOTE — TELEPHONE ENCOUNTER
Pallavi, see below - can you please confirm with patient's new pharmacy? Thank you,  Anu Rust, PharmD, [address]  Direct: [phone]  Department, toll free: [phone], option 7    =======================================================  CLINICAL PHARMACY: STATIN REVIEW    SUBJECTIVE:   Identified as DM care gap for [employer]: statin therapy. OBJECTIVE:  NOTE, Current Outpatient Medications Include:   Medication Sig Dispense Refill    atorvastatin (LIPITOR) 40 MG tablet TAKE 1 TABLET BY MOUTH  DAILY 90 tablet 1     Lab Results   Component Value Date    CHOL 167 05/17/2016    CHOL 140 10/06/2014    CHOL 174 08/23/2013     Lab Results   Component Value Date    TRIG 149 05/17/2016    TRIG 62 10/06/2014    TRIG 141 08/23/2013     Lab Results   Component Value Date    HDL 44 01/10/2019    HDL 49 04/06/2018    HDL 35 (L) 05/17/2016     Lab Results   Component Value Date    LDLCALC 48 01/10/2019    LDLCALC 47 04/06/2018    LDLCALC 102 (H) 05/17/2016     Lab Results   Component Value Date    LABVLDL 12 01/10/2019    LABVLDL 16 04/06/2018    LABVLDL 30 05/17/2016     Lab Results   Component Value Date    ALT 19 01/10/2019      The ASCVD Risk score (Wade Tanner, et al., 2013) failed to calculate for the following reasons: The valid total cholesterol range is 130 to 320 mg/dL    ASSESSMENT:  Appears patient is prescribed statin therapy, however may not have filled yet this year (as his insurance is identifying statin therapy as a gap)? Per 12/20/18 Patient Email with PCP, he was changing insurance, which may change his pharmacy for 2019?   · Per outside medication dispense history: atorvastatin 40mg #90/90ds 4/8/19 at [facility]/pharmacy #[id number]   PLAN:  - Outreach to patient's new pharmacy ([facility] - [phone]) to confirm atorvastatin 40mg daily rx has been picked up

## 2019-05-14 NOTE — TELEPHONE ENCOUNTER
Called to [facility] pharmacy and verified patient picked up rx on 4/8/19

## 2019-05-16 NOTE — TELEPHONE ENCOUNTER
For Pharmacy Admin Tracking Only    PHSO: Yes  Total # of Interventions Recommended: 1  - Maintenance Safety Lab Monitoring #: 1  Recommended intervention potential cost savings: 1  Total Interventions Accepted: 1  Time Spent (min): 15

## 2019-06-04 ENCOUNTER — TELEPHONE (OUTPATIENT)
Dept: INTERNAL MEDICINE CLINIC | Age: 74
End: 2019-06-04

## 2019-06-04 DIAGNOSIS — H91.93 HEARING DIFFICULTY OF BOTH EARS: ICD-10-CM

## 2019-06-04 NOTE — TELEPHONE ENCOUNTER
Patient requesting appointment to have his hearing checked. He states for years he has not been able to hear out of both ears off and on and has ringing in his ears. Please advise. Patient can be reached at phone number provided.

## 2019-06-10 ENCOUNTER — PROCEDURE VISIT (OUTPATIENT)
Dept: AUDIOLOGY | Age: 74
End: 2019-06-10
Payer: MEDICARE

## 2019-06-10 DIAGNOSIS — R42 DIZZINESS AND GIDDINESS: ICD-10-CM

## 2019-06-10 DIAGNOSIS — H93.13 TINNITUS OF BOTH EARS: ICD-10-CM

## 2019-06-10 PROCEDURE — 92557 COMPREHENSIVE HEARING TEST: CPT | Performed by: AUDIOLOGIST

## 2019-06-10 PROCEDURE — 92550 TYMPANOMETRY & REFLEX THRESH: CPT | Performed by: AUDIOLOGIST

## 2019-06-10 NOTE — PROGRESS NOTES
Danie Hammond   1945, 68 y.o. male   [id number]       Referring Provider: Aye Johnson MD   Referral Type: In an order in [address] [place] [address]    Reason for Visit: Evaluation of suspected change in hearing, tinnitus, or balance. ADULT AUDIOLOGIC EVALUATION      Danie Hammond is a 68 y.o. male seen today, 6/10/19, for an initial audiologic evaluation. AUDIOLOGIC AND OTHER PERTINENT MEDICAL HISTORY:        Danie Hammond noted decreased hearing bilaterally over many years; intermittent tinnitus bilaterally, has become more bothersome over time; history of noise exposure - served in [employer] for 2 years in the 1960s, in [employer], he is a right-handed shooter; some dizziness upon standing at times, he relates this to blood pressure; some family history of hearing loss with age. Danie Hammond denied otalgia, aural fullness, otorrhea, history of head trauma, and history of ear surgery. ASSESSMENT AND FINDINGS:       Otoscopy revealed: Clear ear canals bilaterally      RIGHT EAR:  Hearing Sensitivity: Within normal limits through 1500 Hz sloping to mild sensorineural hearing loss. Speech Recognition Threshold: 25 dB HL  Word Recognition: Excellent (98%), based on NU-6 50-word list at 55 dBHL using recorded speech stimuli. This finding is consistent with hearing sensitivity. Tympanometry: Normal peak pressure and compliance, Type A tympanogram, consistent with normal middle ear function. Acoustic Reflexes: Ipsilateral: Absent. Contralateral: Absent. NOTE: Difficulty obtaining seal, many attempts. Noisy responses present, cannot rule out slow-leak of seal.      LEFT EAR:  Hearing Sensitivity: Within normal limits through 1500 Hz precipitously sloping to severe sensorineural notch at 8000 Hz rising to moderately-severe to severe sensorineural hearing loss. Speech Recognition Threshold: 20 dB HL  Word Recognition: Good (84%), based on NU-6 50-word list at 80 dBHL, masked, using recorded speech stimuli. This finding is consistent with hearing sensitivity. NOTE: MCL = 80 dB HL  Tympanometry: Normal peak pressure and compliance, Type A tympanogram, consistent with normal middle ear function. Acoustic Reflexes: Ipsilateral: Present at normal sensation levels 500-1000 Hz, present at low sensation level 2000 Hz. Contralateral (probe left): Present at normal sensation levels 500-1000 Hz, present at low sensation level 2000 Hz. Consistent with cochlear hearing loss. Reliability: Good  Transducer: Inserts    See scanned audiogram dated 6/10/19 for results. PATIENT EDUCATION:       The following items were discussed with the patient:    - Good Communication Strategies   - Hearing Loss and Hearing Aids   - Tinnitus Management Strategies   - Noise-Induced Hearing Loss and use of Hearing Protection Devices (HPDs)    Educational information was shared in the After Visit Summary. RECOMMENDATIONS:                                                                                                                                                                                                                                                                    The following items are recommended based on patient report and results from today's appointment:   - Continue medical follow-up with Zenaida Quevedo MD.   - Consider consult to ENT regarding asymmetry in hearing loss. NOTE: He is a right-handed shooter. - Retest hearing as medically indicated and/or sooner if a change in hearing is noted. - If desired, schedule a Hearing Aid Evaluation (HAE) appointment to discuss hearing aid options. Patient is a  and is exploring audiology services with the [address] [place] [address] Drive \"Good Communication Strategies\" as discussed to assist in speech understanding with communication partners.    - Maintain a sound enriched environment to assist in the management of tinnitus symptoms.     - Use hearing protection devices (HPDs), such as protective ear muffs and ear plugs, when exposed to dangerous sound levels.          [address] [facility]  Audiologist      Chart CC'd to: Stacy Sawant MD       Degree of   Hearing Sensitivity dB Range   Within Normal Limits (WNL) 0 - 20   Mild 20 - 40   Moderate 40 - 55   Moderately-Severe 55 - 70   Severe 70 - 90   Profound 90 +

## 2019-06-10 NOTE — PATIENT INSTRUCTIONS
Good Communication Strategies    Communication can be challenging for anyone, but can be especially difficult for those with some degree of hearing loss. While we may not be able to control every factor that may lead to difficulty with communication, there are Good Communication Strategies that we can all use in our day-to-day lives. Communication takes both parties working together for it to be successful. Tips as a Listener:   1. Control your environment. It is important to limit the amount of background noise in the room when possible. You should also consider having a good light source in the room to best see the other person. 2. Ask for clarification. Instead of saying \"What?\", you can use parts of what you heard to make a new question. For example, if you heard the word \"Thursday\" but not the rest of the week, you may ask \"What was that about Thursday? \" or \"What did you want to do Thursday? \". This shows the person talking that you are listening and will help them better explain what they are saying. 3. Be an advocate for yourself. If you are hearing but not understanding, tell the other person \"I can hear you, but I need you to slow down when you speak. \"  Or if someone is facing the other direction, say \"I cannot hear you when you are not looking at me when we talk. \"       Tips as a Talker:   - Sit or stand 3 to 6 feet away to maximize audibility         -- It is unrealistic to believe someone else will fully hear your message if you are speaking from across the room or in a different room in the house   - Stay at eye level to help with visual cues   - Make sure you have the persons attention before speaking   - Use facial expressions and gestures to accentuate your message   - Raise your voice slightly (do not scream)   - Speak slowly and distinctly   - Use short, simple sentences   - Rephrase your words if the person is having a hard time understanding you    - To avoid distortion, dont speak directly into a persons ear      Some additional items that may be helpful:   - Remain patient - this is important for both parties   - Write down items that still cannot be heard/understood. You may write with pen/paper or consider typing/texting on a cell phone or smart device. - If background noise is unavoidable, try to keep yourself in a good position in the room. By sitting at a gonsalves on the side of the restaurant (preferably a corner), it will be easier to communicate than if you were sitting at a table in the middle with background noise surrounding you. Keep yourself positioned away from music speakers or heavy foot traffic. Noise-Induced Hearing Loss  What it is, and what you can do to prevent it      Exposure to loud sounds, in an occupational setting or recreational, can cause permanent hearing loss. Sound is measured in decibels (dB). Noise-induced hearing loss is the ONLY type of preventable hearing loss. Hearing loss related to noise exposure can occur at any age. There are small sensory cells, called inner and outer hair cells, within the inner ear (cochlea). These cells process the loudness (intensity) and pitch (frequency) of sound and send the signal to the brain via our auditory nerve (vestibulocochlear nerve, cranial nerve VIII). When these cells are damaged, they can result in permanent hearing loss and/or tinnitus. The hair cells responsible for high frequency sounds, like birds chirping, are most likely to be damaged due to loud sounds. The high frequency sounds are also very important for our clarity and understanding of speech. OCCUPATIONAL NOISE EXPOSURE RECREATIONAL NOISE EXPOSURE   Some jobs may have exposure to loud sounds in the workplace. These jobs may include but are not limited to:  Kellen Ortega [employer]   Welding   Landscaping   Hairdressing/hairstyling   Musicians  [employer]    ... And more!  Many activities outside of work may cause permanent hearing loss. These activities may include but are not limited to:  Lawnmowers, leaf blowers  [employer] (such as pigs squealing)   Chainsaws and other power tools  [employer] musical instruments and/or singing   Listening to music too loudly - at concerts, through stereo, through ear buds or headphones   Attending sporting events   Attending fireworks shows or using fireworks at home  [employer] of firearms   . .. And more! REDUCE OR PROTECT YOUR EARS FROM NOISE EXPOSURE    To do your best to avoid noise-induced hearing loss, here are some tips:   Limit exposure to loud sounds. 85 dB (decibels) is safe for 8 hours. As sounds are louder, the length of time the sound is safe lessens. These numbers are cumulative across a 24-hour period. (NIOSH and CDC, 2002)  o 85 dB is safe for 8 hours  o 88 dB is safe for 4 hours  o 91 dB is safe for 2 hours  o 94 dB is safe for 1 hour  o 97 dB is safe for 30 minutes  o 100 dB is safe for 15 minutes  o 103 dB is safe for 7.5 minutes  o 106 dB is safe for 3.75 minutes  o 109 dB is safe for LESS THAN 2 minutes  o 112 dB is safe for LESS THAN 1 minute  o 115 dB is safe for ~ 30 seconds  o 130 dB can cause IMMEDIATE hearing loss   If you are unsure if a sound is too loud, consider checking the sound level with a \"sound level meter\". There are apps on smart devices that can measure the loudness of the sound. They are not as accurate as expensive equipment used by scientists, but it will give you a guesstimate of how loud the sound is, and if it may be damaging to your hearing.  If you cannot avoid loud sounds, here are ways to reduce your exposure:  o 1. Wear hearing protection  - Ear plugs and protective ear muffs can be used to reduce the intensity of the sound. The higher the NRR (noise reduction rating), the better reduction of the intensity of the sound   o 2.  Turn the volume down  - When protection around loud noises. · If appropriate, wear hearing aid(s) as directed. It is recommended that hearing aids are worn during all waking hours to keep your brain active and give it access to the sounds it is missing. · If you are beginning your process with hearing aid(s), schedule a \"Hearing Aid Evaluation\" with an audiologist to discuss your lifestyle, features of hearing aid technology, and styles of hearing aids available. It is recommended that you contact your insurance company to determine if you have a hearing aid benefit, as this may dictate who you can see for these services. · Have hearing tests as your doctor suggests. They can show whether your hearing has changed. Your hearing aid may need to be adjusted. · Use other assistive devices as needed. These may include:  ? Telephone amplifiers and hearing aids that can connect to a television, stereo, radio, or microphone. ? Devices that use lights or vibrations. These alert you to the doorbell, a ringing telephone, or a baby monitor. ? Television closed-captioning. This shows the words at the bottom of the screen. Most new TVs can do this. ? TTY (text telephone). This lets you type messages back and forth on the telephone instead of talking or listening. These devices are also called TDD. When messages are typed on the keyboard, they are sent over the phone line to a receiving TTY. The message is shown on a monitor. · Use pagers, fax machines, text, and email if it is hard for you to communicate by telephone. · Try to learn a listening technique called speech-reading. It is not lip-reading. You pay attention to people's gestures, expressions, posture, and tone of voice. These clues can help you understand what a person is saying. Face the person you are talking to, and have him or her face you. Make sure the lighting is good. You need to see the other person's face clearly.   · Think about counseling if you need help to adjust to your symptoms of a stroke. These may include:  ? Sudden numbness, tingling, weakness, or loss of movement in your face, arm, or leg, especially on only one side of your body. ? Sudden vision changes. ? Sudden trouble speaking. ? Sudden confusion or trouble understanding simple statements. ? Sudden problems with walking or balance. ? A sudden, severe headache that is different from past headaches. Call your doctor now or seek immediate medical care if:    · You develop other symptoms. These may include hearing loss (or worse hearing loss), balance problems, dizziness, nausea, or vomiting. Watch closely for changes in your health, and be sure to contact your doctor if:    · Your tinnitus moves from both ears to one ear. · Your hearing loss gets worse within 1 day after an ear injury. · Your tinnitus or hearing loss does not get better within 1 week after an ear injury. · Your tinnitus bothers you enough that you want to take medicines to help you cope with it. If you notice changes in your tinnitus and/or your hearing, it is recommended that you have your hearing tested by your audiologist and to follow-up with your physician that manages your hearing loss (such as your ENT or Primary Care doctor).

## 2019-07-01 RX ORDER — LEVOTHYROXINE SODIUM 0.07 MG/1
TABLET ORAL
Qty: 30 TABLET | Refills: 0 | Status: SHIPPED | OUTPATIENT
Start: 2019-07-01 | End: 2019-07-27 | Stop reason: SDUPTHER

## 2019-07-01 RX ORDER — ATORVASTATIN CALCIUM 40 MG/1
TABLET, FILM COATED ORAL
Qty: 30 TABLET | Refills: 0 | Status: SHIPPED | OUTPATIENT
Start: 2019-07-01 | End: 2019-07-27 | Stop reason: SDUPTHER

## 2019-07-29 RX ORDER — ATORVASTATIN CALCIUM 40 MG/1
TABLET, FILM COATED ORAL
Qty: 30 TABLET | Refills: 5 | Status: SHIPPED | OUTPATIENT
Start: 2019-07-29 | End: 2019-10-31 | Stop reason: SDUPTHER

## 2019-07-29 RX ORDER — LEVOTHYROXINE SODIUM 0.07 MG/1
TABLET ORAL
Qty: 30 TABLET | Refills: 5 | Status: SHIPPED | OUTPATIENT
Start: 2019-07-29 | End: 2019-10-31 | Stop reason: SDUPTHER

## 2019-10-31 ENCOUNTER — OFFICE VISIT (OUTPATIENT)
Dept: INTERNAL MEDICINE CLINIC | Age: 74
End: 2019-10-31
Payer: MEDICARE

## 2019-10-31 VITALS
HEART RATE: 62 BPM | HEIGHT: 68 IN | BODY MASS INDEX: 28.04 KG/M2 | OXYGEN SATURATION: 98 % | SYSTOLIC BLOOD PRESSURE: 120 MMHG | DIASTOLIC BLOOD PRESSURE: 66 MMHG | WEIGHT: 185 LBS

## 2019-10-31 DIAGNOSIS — E11.22 TYPE 2 DIABETES MELLITUS WITH STAGE 3 CHRONIC KIDNEY DISEASE, WITHOUT LONG-TERM CURRENT USE OF INSULIN (HCC): Primary | ICD-10-CM

## 2019-10-31 DIAGNOSIS — E03.4 HYPOTHYROIDISM DUE TO ACQUIRED ATROPHY OF THYROID: ICD-10-CM

## 2019-10-31 DIAGNOSIS — R25.1 TREMOR: ICD-10-CM

## 2019-10-31 DIAGNOSIS — E78.00 PURE HYPERCHOLESTEROLEMIA: ICD-10-CM

## 2019-10-31 DIAGNOSIS — I10 ESSENTIAL HYPERTENSION: ICD-10-CM

## 2019-10-31 DIAGNOSIS — N18.30 TYPE 2 DIABETES MELLITUS WITH STAGE 3 CHRONIC KIDNEY DISEASE, WITHOUT LONG-TERM CURRENT USE OF INSULIN (HCC): Primary | ICD-10-CM

## 2019-10-31 LAB
CREATININE URINE: 194.9 MG/DL (ref 39–259)
MICROALBUMIN UR-MCNC: 5 MG/DL
MICROALBUMIN/CREAT UR-RTO: 25.7 MG/G (ref 0–30)

## 2019-10-31 PROCEDURE — 99214 OFFICE O/P EST MOD 30 MIN: CPT | Performed by: INTERNAL MEDICINE

## 2019-10-31 RX ORDER — PIOGLITAZONEHYDROCHLORIDE 15 MG/1
15 TABLET ORAL DAILY
COMMUNITY

## 2019-10-31 RX ORDER — LEVOTHYROXINE SODIUM 0.07 MG/1
TABLET ORAL
Qty: 90 TABLET | Refills: 1 | Status: SHIPPED | OUTPATIENT
Start: 2019-10-31 | End: 2020-04-30

## 2019-10-31 RX ORDER — ATORVASTATIN CALCIUM 40 MG/1
TABLET, FILM COATED ORAL
Qty: 90 TABLET | Refills: 1 | Status: SHIPPED | OUTPATIENT
Start: 2019-10-31 | End: 2020-04-30

## 2019-11-06 ENCOUNTER — TELEPHONE (OUTPATIENT)
Dept: INTERNAL MEDICINE CLINIC | Age: 74
End: 2019-11-06

## 2020-04-03 ENCOUNTER — VIRTUAL VISIT (OUTPATIENT)
Dept: INTERNAL MEDICINE CLINIC | Age: 75
End: 2020-04-03
Payer: MEDICARE

## 2020-04-03 PROCEDURE — 99213 OFFICE O/P EST LOW 20 MIN: CPT | Performed by: INTERNAL MEDICINE

## 2020-04-30 ENCOUNTER — TELEPHONE (OUTPATIENT)
Dept: INTERNAL MEDICINE CLINIC | Age: 75
End: 2020-04-30

## 2020-04-30 PROBLEM — M25.512 ACUTE PAIN OF BOTH SHOULDERS: Status: ACTIVE | Noted: 2020-04-30

## 2020-04-30 PROBLEM — M25.511 ACUTE PAIN OF BOTH SHOULDERS: Status: ACTIVE | Noted: 2020-04-30

## 2020-04-30 RX ORDER — LEVOTHYROXINE SODIUM 0.07 MG/1
TABLET ORAL
Qty: 90 TABLET | Refills: 1 | Status: SHIPPED | OUTPATIENT
Start: 2020-04-30 | End: 2020-10-23

## 2020-04-30 RX ORDER — ATORVASTATIN CALCIUM 40 MG/1
TABLET, FILM COATED ORAL
Qty: 90 TABLET | Refills: 1 | Status: SHIPPED | OUTPATIENT
Start: 2020-04-30 | End: 2020-10-26

## 2020-05-01 ENCOUNTER — OFFICE VISIT (OUTPATIENT)
Dept: ORTHOPEDIC SURGERY | Age: 75
End: 2020-05-01
Payer: MEDICARE

## 2020-05-01 VITALS — WEIGHT: 186 LBS | BODY MASS INDEX: 27.55 KG/M2 | HEIGHT: 69 IN

## 2020-05-01 PROCEDURE — 99203 OFFICE O/P NEW LOW 30 MIN: CPT | Performed by: ORTHOPAEDIC SURGERY

## 2020-05-01 NOTE — PROGRESS NOTES
own.  We also did discuss a subacromial cortisone injection but at this time he would like to hold off on that and try and see how would count improvement he gets with therapy. Also did recommend use of ice and heat over the shoulder.   I will see him back in clinic in 4 to 6 weeks for follow-up if continuing to have pain and I would then recommend subacromial cortisone injection

## 2020-07-10 ENCOUNTER — OFFICE VISIT (OUTPATIENT)
Dept: INTERNAL MEDICINE CLINIC | Age: 75
End: 2020-07-10
Payer: MEDICARE

## 2020-07-10 VITALS
OXYGEN SATURATION: 98 % | SYSTOLIC BLOOD PRESSURE: 132 MMHG | TEMPERATURE: 97.3 F | HEART RATE: 64 BPM | WEIGHT: 193 LBS | DIASTOLIC BLOOD PRESSURE: 64 MMHG | BODY MASS INDEX: 28.5 KG/M2

## 2020-07-10 PROCEDURE — 99214 OFFICE O/P EST MOD 30 MIN: CPT | Performed by: INTERNAL MEDICINE

## 2020-07-10 PROCEDURE — 3288F FALL RISK ASSESSMENT DOCD: CPT | Performed by: INTERNAL MEDICINE

## 2020-07-10 PROCEDURE — G8510 SCR DEP NEG, NO PLAN REQD: HCPCS | Performed by: INTERNAL MEDICINE

## 2020-07-10 RX ORDER — RIVASTIGMINE TARTRATE 1.5 MG/1
1.5 CAPSULE ORAL 2 TIMES DAILY
COMMUNITY
End: 2021-01-08

## 2020-07-10 ASSESSMENT — PATIENT HEALTH QUESTIONNAIRE - PHQ9
SUM OF ALL RESPONSES TO PHQ QUESTIONS 1-9: 0
SUM OF ALL RESPONSES TO PHQ QUESTIONS 1-9: 0
SUM OF ALL RESPONSES TO PHQ9 QUESTIONS 1 & 2: 0
2. FEELING DOWN, DEPRESSED OR HOPELESS: 0
1. LITTLE INTEREST OR PLEASURE IN DOING THINGS: 0

## 2020-07-10 NOTE — PROGRESS NOTES
Assessment/Plan     1. Type 2 diabetes mellitus with stage 3 chronic kidney disease, without long-term current use of insulin ([facility]  Well-controlled per endocrinologist.  Continue current medications. - Hemoglobin A1C; Future  -  DIABETES FOOT EXAM    2. Essential hypertension  Well-controlled. Continue current medications. - Comprehensive Metabolic Panel, Fasting; Future    3. Pure hypercholesterolemia  Tolerating current dose(s) of Lipitor- continue.   - Lipid, Fasting; Future    4. Hypothyroidism due to acquired atrophy of thyroid  Fatigue is likely multifactorial, but will adjust levothyroxine dose if indicated by lab results. 5. Prostate cancer ([facility].)  Asymptomatic. Patient agrees to follow up with urology for ongoing surveillance. Return in about 6 months (around 1/10/2021) for 30 MIN F/U. Cyril Franco   YOB: 1945    Date of Visit:  7/10/2020    No Known Allergies   Prior to Visit Medications    Medication Sig Taking? Authorizing Provider   rivastigmine (EXELON) 1.5 MG capsule Take 1.5 mg by mouth 2 times daily Yes Historical Provider, MD   levothyroxine (SYNTHROID) 75 MCG tablet TAKE 1 TABLET BY MOUTH EVERY DAY Yes Alyse Phillips MD   atorvastatin (LIPITOR) 40 MG tablet TAKE 1 TABLET BY MOUTH EVERY DAY Yes Alyse Phillips MD   pioglitazone (ACTOS) 15 MG tablet Take 15 mg by mouth daily Yes Historical Provider, MD   carbidopa-levodopa (SINEMET)  MG per tablet Take 2.5 tablets by mouth 3 times daily  Yes Historical Provider, MD   polyethylene glycol (MIRALAX) powder Take 1 capful orally daily.  Yes Alyse Phillips MD   aspirin 81 MG EC tablet Take 81 mg by mouth nightly Yes Historical Provider, MD   metFORMIN ER (GLUCOPHAGE-XR) 750 MG XR tablet Take 750 mg by mouth 2 times daily  Yes Historical Provider, MD   acetaminophen (APAP EXTRA STRENGTH) 500 MG tablet Take 1 tablet by mouth every 6 hours as needed for Pain  Patient not taking: Reported on 7/10/2020  Clemente Mcdaniel MD       Vitals:    07/10/20 1358   BP: 132/64   Pulse: 64   Temp: 97.3 °F (36.3 °C)   TempSrc: Oral   SpO2: 98%   Weight: 193 lb (87.5 kg)      Body mass index is 28.5 kg/m². Wt Readings from Last 3 Encounters:   07/10/20 193 lb (87.5 kg)   05/01/20 186 lb (84.4 kg)   10/31/19 185 lb (83.9 kg)     BP Readings from Last 3 Encounters:   07/10/20 132/64   10/31/19 120/66   05/10/19 92/64       CC:  Patient presents for re-evaluation of the following medical concerns     HPI  Treatment Adherence:   Medication compliance:  compliant all of the time  Diet compliance:  Comfort eating due to quarantine- attended diabetic education classes  Weight trend: Up 6 pounds over the past 3 months  Current exercise: Walking daily- 20 minutes  Barriers: lack of motivation    Diabetes Mellitus Type 2: Current symptoms/problems include none. Last HgbA1c 7.2- about 2 months ago per endocrinology. Home blood sugar records: fasting range: 140s-150s  Any episodes of hypoglycemia? no  Daily Aspirin? Yes    Hypertension:  Home blood pressure monitoring: No.  He is adherent to a low sodium diet. Patient denies chest pain, shortness of breath, headache, lightheadedness and palpitations. Antihypertensive medication side effects: no medication side effects noted. Use of agents associated with hypertension: none. Hyperlipidemia:  No new myalgias or GI upset on atorvastatin (Lipitor). Hypothyroidism: Recent symptoms: Fatigue worse with quarantine. He denies cold intolerance, heat intolerance, constipation, diarrhea and palpitations. Patient is taking his medication consistently on an empty stomach. Prostate cancer:  Last saw Dr. Yris Bolden in 2013- recommended active surveillance, but did not follow up. No urinary symptoms.     Lab Results   Component Value Date    LDLCALC 27 10/31/2019    LDLCALC 48 01/10/2019    TRIGLYCFAST 70 10/31/2019    TRIGLYCFAST 60 01/10/2019    TRIG 149 05/17/2016    HDL 68 (H) 10/31/2019     Lab Results   Component Value Date    GLUF 152 (H) 10/31/2019    GLUCOSE 116 (H) 09/28/2017    LABA1C 7.7 07/06/2018    LABA1C 7.9 04/06/2018    LABA1C 6.2 04/20/2017     Lab Results   Component Value Date     10/31/2019    K 5.0 10/31/2019    BUN 22 (H) 10/31/2019    CREATININE 1.1 10/31/2019    LABGLOM >60 10/31/2019    GFRAA >60 10/31/2019    CALCIUM 9.9 10/31/2019     Lab Results   Component Value Date    ALT <5 (L) 10/31/2019    AST 13 (L) 10/31/2019     Lab Results   Component Value Date    HGB 14.9 04/06/2018     Lab Results   Component Value Date    TSHREFLEX 10.85 (H) 05/17/2016    TSH 2.71 10/31/2019        Review of Systems  As documented in HPI    Social History     Tobacco Use    Smoking status: Never Smoker    Smokeless tobacco: Never Used   Substance Use Topics    Alcohol use: No        Physical Exam  Constitutional:       General: He is not in acute distress. Appearance: He is well-developed. Eyes:      Conjunctiva/sclera: Conjunctivae normal.   Neck:      Thyroid: No thyroid mass or thyromegaly. Vascular: No carotid bruit. Cardiovascular:      Rate and Rhythm: Normal rate and regular rhythm. Heart sounds: Normal heart sounds. No murmur. No friction rub. No gallop. Pulmonary:      Effort: Pulmonary effort is normal. No respiratory distress. Breath sounds: Normal breath sounds. No wheezing or rales. Abdominal:      General: Bowel sounds are normal. There is no distension. Palpations: Abdomen is soft. Tenderness: There is no abdominal tenderness. Musculoskeletal:      Right lower leg: No edema. Left lower leg: No edema. Skin:     General: Skin is warm and dry. Findings: No erythema or rash. Neurological:      Mental Status: He is alert and oriented to person, place, and time. Cranial Nerves: No cranial nerve deficit. Sensory: No sensory deficit.       Coordination: Coordination normal.      Comments: Neuro exam deferred to neurologist   Psychiatric:         Behavior: Behavior normal.         Thought Content:  Thought content normal.         Judgment: Judgment normal.     Visual inspection:  Deformity/amputation: present - multiple hammer toes bilaterally  Skin lesions/pre-ulcerative calluses: absent  Edema: right- negative, left- negative    Sensory exam:  Monofilament sensation: normal  (minimum of 5 random plantar locations tested, avoiding callused areas - > 1 area with absence of sensation is + for neuropathy)    Plus at least one of the following:  Pulses: normal,   Pinprick: N/A  Proprioception: N/A  Vibration (128 Hz): N/A

## 2020-07-15 DIAGNOSIS — E11.22 TYPE 2 DIABETES MELLITUS WITH STAGE 3 CHRONIC KIDNEY DISEASE, WITHOUT LONG-TERM CURRENT USE OF INSULIN (HCC): ICD-10-CM

## 2020-07-15 DIAGNOSIS — I10 ESSENTIAL HYPERTENSION: ICD-10-CM

## 2020-07-15 DIAGNOSIS — E78.00 PURE HYPERCHOLESTEROLEMIA: ICD-10-CM

## 2020-07-15 DIAGNOSIS — N18.30 TYPE 2 DIABETES MELLITUS WITH STAGE 3 CHRONIC KIDNEY DISEASE, WITHOUT LONG-TERM CURRENT USE OF INSULIN (HCC): ICD-10-CM

## 2020-07-15 LAB
A/G RATIO: 1.8 (ref 1.1–2.2)
ALBUMIN SERPL-MCNC: 4.2 G/DL (ref 3.4–5)
ALP BLD-CCNC: 91 U/L (ref 40–129)
ALT SERPL-CCNC: <5 U/L (ref 10–40)
ANION GAP SERPL CALCULATED.3IONS-SCNC: 10 MMOL/L (ref 3–16)
AST SERPL-CCNC: 11 U/L (ref 15–37)
BILIRUB SERPL-MCNC: 0.5 MG/DL (ref 0–1)
BUN BLDV-MCNC: 26 MG/DL (ref 7–20)
CALCIUM SERPL-MCNC: 9.4 MG/DL (ref 8.3–10.6)
CHLORIDE BLD-SCNC: 104 MMOL/L (ref 99–110)
CHOLESTEROL, FASTING: 104 MG/DL (ref 0–199)
CO2: 24 MMOL/L (ref 21–32)
CREAT SERPL-MCNC: 1.2 MG/DL (ref 0.8–1.3)
GFR AFRICAN AMERICAN: >60
GFR NON-AFRICAN AMERICAN: 59
GLOBULIN: 2.3 G/DL
GLUCOSE FASTING: 161 MG/DL (ref 70–99)
HDLC SERPL-MCNC: 49 MG/DL (ref 40–60)
LDL CHOLESTEROL CALCULATED: 47 MG/DL
POTASSIUM SERPL-SCNC: 4.9 MMOL/L (ref 3.5–5.1)
SODIUM BLD-SCNC: 138 MMOL/L (ref 136–145)
TOTAL PROTEIN: 6.5 G/DL (ref 6.4–8.2)
TRIGLYCERIDE, FASTING: 40 MG/DL (ref 0–150)
VLDLC SERPL CALC-MCNC: 8 MG/DL

## 2020-07-16 LAB
ESTIMATED AVERAGE GLUCOSE: 177.2 MG/DL
HBA1C MFR BLD: 7.8 %

## 2020-07-31 ENCOUNTER — OFFICE VISIT (OUTPATIENT)
Dept: INTERNAL MEDICINE CLINIC | Age: 75
End: 2020-07-31
Payer: MEDICARE

## 2020-07-31 VITALS
OXYGEN SATURATION: 97 % | BODY MASS INDEX: 29.09 KG/M2 | WEIGHT: 197 LBS | HEART RATE: 68 BPM | SYSTOLIC BLOOD PRESSURE: 138 MMHG | TEMPERATURE: 98.9 F | DIASTOLIC BLOOD PRESSURE: 74 MMHG

## 2020-07-31 PROCEDURE — 99213 OFFICE O/P EST LOW 20 MIN: CPT | Performed by: INTERNAL MEDICINE

## 2020-07-31 NOTE — PATIENT INSTRUCTIONS
Patient Education        Low Back Pain: Exercises  Introduction  Here are some examples of exercises for you to try. The exercises may be suggested for a condition or for rehabilitation. Start each exercise slowly. Ease off the exercises if you start to have pain. You will be told when to start these exercises and which ones will work best for you. How to do the exercises  Press-up   1. Lie on your stomach, supporting your body with your forearms. 2. Press your elbows down into the floor to raise your upper back. As you do this, relax your stomach muscles and allow your back to arch without using your back muscles. As your press up, do not let your hips or pelvis come off the floor. 3. Hold for 15 to 30 seconds, then relax. 4. Repeat 2 to 4 times. Alternate arm and leg (bird dog) exercise   Do this exercise slowly. Try to keep your body straight at all times, and do not let one hip drop lower than the other. 1. Start on the floor, on your hands and knees. 2. Tighten your belly muscles. 3. Raise one leg off the floor, and hold it straight out behind you. Be careful not to let your hip drop down, because that will twist your trunk. 4. Hold for about 6 seconds, then lower your leg and switch to the other leg. 5. Repeat 8 to 12 times on each leg. 6. Over time, work up to holding for 10 to 30 seconds each time. 7. If you feel stable and secure with your leg raised, try raising the opposite arm straight out in front of you at the same time. Knee-to-chest exercise   1. Lie on your back with your knees bent and your feet flat on the floor. 2. Bring one knee to your chest, keeping the other foot flat on the floor (or keeping the other leg straight, whichever feels better on your lower back). 3. Keep your lower back pressed to the floor. Hold for at least 15 to 30 seconds. 4. Relax, and lower the knee to the starting position. 5. Repeat with the other leg. Repeat 2 to 4 times with each leg.   6. To get more stretch, put your other leg flat on the floor while pulling your knee to your chest.    Curl-ups   1. Lie on the floor on your back with your knees bent at a 90-degree angle. Your feet should be flat on the floor, about 12 inches from your buttocks. 2. Cross your arms over your chest. If this bothers your neck, try putting your hands behind your neck (not your head), with your elbows spread apart. 3. Slowly tighten your belly muscles and raise your shoulder blades off the floor. 4. Keep your head in line with your body, and do not press your chin to your chest.  5. Hold this position for 1 or 2 seconds, then slowly lower yourself back down to the floor. 6. Repeat 8 to 12 times. Pelvic tilt exercise   1. Lie on your back with your knees bent. 2. \"Brace\" your stomach. This means to tighten your muscles by pulling in and imagining your belly button moving toward your spine. You should feel like your back is pressing to the floor and your hips and pelvis are rocking back. 3. Hold for about 6 seconds while you breathe smoothly. 4. Repeat 8 to 12 times. Heel dig bridging   1. Lie on your back with both knees bent and your ankles bent so that only your heels are digging into the floor. Your knees should be bent about 90 degrees. 2. Then push your heels into the floor, squeeze your buttocks, and lift your hips off the floor until your shoulders, hips, and knees are all in a straight line. 3. Hold for about 6 seconds as you continue to breathe normally, and then slowly lower your hips back down to the floor and rest for up to 10 seconds. 4. Do 8 to 12 repetitions. Hamstring stretch in doorway   1. Lie on your back in a doorway, with one leg through the open door. 2. Slide your leg up the wall to straighten your knee. You should feel a gentle stretch down the back of your leg. 3. Hold the stretch for at least 15 to 30 seconds. Do not arch your back, point your toes, or bend either knee.  Keep one heel touching the floor and the other heel touching the wall. 4. Repeat with your other leg. 5. Do 2 to 4 times for each leg. Hip flexor stretch   1. Kneel on the floor with one knee bent and one leg behind you. Place your forward knee over your foot. Keep your other knee touching the floor. 2. Slowly push your hips forward until you feel a stretch in the upper thigh of your rear leg. 3. Hold the stretch for at least 15 to 30 seconds. Repeat with your other leg. 4. Do 2 to 4 times on each side. Wall sit   1. Stand with your back 10 to 12 inches away from a wall. 2. Lean into the wall until your back is flat against it. 3. Slowly slide down until your knees are slightly bent, pressing your lower back into the wall. 4. Hold for about 6 seconds, then slide back up the wall. 5. Repeat 8 to 12 times. Follow-up care is a key part of your treatment and safety. Be sure to make and go to all appointments, and call your doctor if you are having problems. It's also a good idea to know your test results and keep a list of the medicines you take. Where can you learn more? Go to [email or web address][email or web address]. org and sign in to your [employer] account. Enter Q187 in the [facility] box to learn more about \"Low Back Pain: Exercises. \"     If you do not have an account, please click on the \"Sign Up Now\" link. Current as of: March 2, 2020               Content Version: 12.5  © 2006-2020 [employer]wise, Incorporated. Care instructions adapted under license by [facility] ([facility]). If you have questions about a medical condition or this instruction, always ask your healthcare professional. [place] [address] any warranty or liability for your use of this information.

## 2020-07-31 NOTE — PROGRESS NOTES
Assessment/Plan:     1. Acute right-sided low back pain without sciatica  Suspect lumbar muscle strain, but in light of prostate cancer history, if no improvement within 2 weeks, he will obtain x-ray of lumbar spine. Exercises and supportive care guidelines provided. If x-ray negative and symptoms persist, consider PT. Patient will call if symptoms change or worsen. Stephanie Drummond   YOB: 1945    Date of Visit:  7/31/2020    CC:  LBP    HPI:  Back Pain:  Patient complains of a 1 week(s) history of right lower back pain. Pain is dull in nature, without radiation. Pain is intermittent, typically mild-moderate in intensity, and is exacerbated by nothing in particular. Associated symptoms: none. Patient reports the following CPR Red Flags: history of cancer- prostate 4/13, active surveillance. Precipitating factors: no known injury. Patient's history includes no prior back problems. Previous treatment: none. Diagnostic testing: none. Symptoms show no change over time. Review of Systems:  As documented in HPI    Prior to Visit Medications    Medication Sig Taking? Authorizing Provider   rivastigmine (EXELON) 1.5 MG capsule Take 1.5 mg by mouth 2 times daily Yes Historical Provider, MD   levothyroxine (SYNTHROID) 75 MCG tablet TAKE 1 TABLET BY MOUTH EVERY DAY Yes Bettie Kendrick MD   atorvastatin (LIPITOR) 40 MG tablet TAKE 1 TABLET BY MOUTH EVERY DAY Yes Bettie Kendrick MD   pioglitazone (ACTOS) 15 MG tablet Take 15 mg by mouth daily Yes Historical Provider, MD   carbidopa-levodopa (SINEMET)  MG per tablet Take 2.5 tablets by mouth 3 times daily  Yes Historical Provider, MD   polyethylene glycol (MIRALAX) powder Take 1 capful orally daily.  Yes Bettie Kendrick MD   acetaminophen (APAP EXTRA STRENGTH) 500 MG tablet Take 1 tablet by mouth every 6 hours as needed for Pain Yes Clemente Mcdaniel MD   aspirin 81 MG EC tablet Take 81 mg by mouth nightly Yes Historical Provider, MD metFORMIN ER (GLUCOPHAGE-XR) 750 MG XR tablet Take 750 mg by mouth 2 times daily  Yes Historical Provider, MD       Vitals:    07/31/20 1600   BP: 138/74   Pulse: 68   Temp: 98.9 °F (37.2 °C)   TempSrc: Oral   SpO2: 97%   Weight: 197 lb (89.4 kg)      Body mass index is 29.09 kg/m². Wt Readings from Last 3 Encounters:   07/31/20 197 lb (89.4 kg)   07/10/20 193 lb (87.5 kg)   05/01/20 186 lb (84.4 kg)     BP Readings from Last 3 Encounters:   07/31/20 138/74   07/10/20 132/64   10/31/19 120/66       Physical Exam  Constitutional:       General: He is not in acute distress. Appearance: He is well-developed. Abdominal:      General: Bowel sounds are normal. There is no distension. Palpations: Abdomen is soft. Tenderness: There is no abdominal tenderness. Musculoskeletal:      Comments: There is no tenderness over the lumbar spine and sacral spine. Paraspinal muscle spasm/tenderness:  Yes, Right. Skin:     General: Skin is warm and dry. Findings: No erythema or rash. Neurological:      Mental Status: He is alert and oriented to person, place, and time. Comments: No sensory or motor deficit is noted. Deep tendon reflexes are 1+ and equal bilaterally. Straight leg raise is negative bilaterally.    Psychiatric:         Behavior: Behavior normal.

## 2020-10-23 RX ORDER — LEVOTHYROXINE SODIUM 0.07 MG/1
TABLET ORAL
Qty: 90 TABLET | Refills: 1 | Status: SHIPPED | OUTPATIENT
Start: 2020-10-23 | Stop reason: SDUPTHER

## 2020-10-26 RX ORDER — ATORVASTATIN CALCIUM 40 MG/1
TABLET, FILM COATED ORAL
Qty: 90 TABLET | Refills: 1 | Status: SHIPPED | OUTPATIENT
Start: 2020-10-26 | End: 2021-04-22

## 2021-01-07 NOTE — ASSESSMENT & PLAN NOTE
At HgbA1c goal of < 8.0 per endocrinology (7.5- 10/28/20).   Continue current medications and follow up as directed with specialist.  He was reminded to schedule his diabetic eye exam.

## 2021-01-07 NOTE — ASSESSMENT & PLAN NOTE
Euthyroid per recent labs with endocrinologist (TSH 1.42- 10/28/20). Continue current dose of Synthroid and regular follow up with endocrinology.

## 2021-01-08 ENCOUNTER — OFFICE VISIT (OUTPATIENT)
Dept: INTERNAL MEDICINE CLINIC | Age: 76
End: 2021-01-08
Payer: MEDICARE

## 2021-01-08 VITALS
DIASTOLIC BLOOD PRESSURE: 74 MMHG | WEIGHT: 187 LBS | SYSTOLIC BLOOD PRESSURE: 134 MMHG | HEIGHT: 67 IN | BODY MASS INDEX: 29.35 KG/M2 | HEART RATE: 64 BPM

## 2021-01-08 DIAGNOSIS — I10 ESSENTIAL HYPERTENSION: ICD-10-CM

## 2021-01-08 DIAGNOSIS — N18.31 TYPE 2 DIABETES MELLITUS WITH STAGE 3A CHRONIC KIDNEY DISEASE, WITHOUT LONG-TERM CURRENT USE OF INSULIN (HCC): ICD-10-CM

## 2021-01-08 DIAGNOSIS — E78.00 PURE HYPERCHOLESTEROLEMIA: ICD-10-CM

## 2021-01-08 DIAGNOSIS — C61 PROSTATE CANCER (HCC): ICD-10-CM

## 2021-01-08 DIAGNOSIS — Z00.00 ROUTINE GENERAL MEDICAL EXAMINATION AT A HEALTH CARE FACILITY: Primary | ICD-10-CM

## 2021-01-08 DIAGNOSIS — E11.22 TYPE 2 DIABETES MELLITUS WITH STAGE 3A CHRONIC KIDNEY DISEASE, WITHOUT LONG-TERM CURRENT USE OF INSULIN (HCC): ICD-10-CM

## 2021-01-08 DIAGNOSIS — E03.4 HYPOTHYROIDISM DUE TO ACQUIRED ATROPHY OF THYROID: ICD-10-CM

## 2021-01-08 PROCEDURE — G0439 PPPS, SUBSEQ VISIT: HCPCS | Performed by: INTERNAL MEDICINE

## 2021-01-08 ASSESSMENT — PATIENT HEALTH QUESTIONNAIRE - PHQ9
SUM OF ALL RESPONSES TO PHQ QUESTIONS 1-9: 0
SUM OF ALL RESPONSES TO PHQ QUESTIONS 1-9: 0
SUM OF ALL RESPONSES TO PHQ9 QUESTIONS 1 & 2: 0
SUM OF ALL RESPONSES TO PHQ QUESTIONS 1-9: 0

## 2021-01-08 ASSESSMENT — LIFESTYLE VARIABLES: HOW OFTEN DO YOU HAVE A DRINK CONTAINING ALCOHOL: 0

## 2021-01-08 NOTE — PROGRESS NOTES
Medicare Annual Wellness Visit  Name: Aleksandr Avila Date: 2021   MRN: [id number] Sex: Male   Age: 76 y.o. Ethnicity: Non-/Non    : 1945 Race: Elvira Hallman is here for Medicare AWV    Screenings for behavioral, psychosocial and functional/safety risks, and cognitive dysfunction are all negative except as indicated below. These results, as well as other patient data from the [address] form, are documented in Flowsheets linked to this Encounter. No Known Allergies    Prior to Visit Medications    Medication Sig Taking?  Authorizing Provider   atorvastatin (LIPITOR) 40 MG tablet TAKE 1 TABLET BY MOUTH EVERY DAY Yes Shannon Farris MD   levothyroxine (SYNTHROID) 75 MCG tablet TAKE 1 TABLET BY MOUTH EVERY DAY Yes Shannon Farris MD   pioglitazone (ACTOS) 15 MG tablet Take 15 mg by mouth daily Yes Historical Provider, MD   carbidopa-levodopa (SINEMET)  MG per tablet Take 2.5 tablets by mouth 3 times daily  Yes Historical Provider, MD   aspirin 81 MG EC tablet Take 81 mg by mouth nightly Yes Historical Provider, MD   metFORMIN (GLUCOPHAGE-XR) 500 MG extended release tablet Take 1,000 mg by mouth 2 times daily  Yes Historical Provider, MD       Past Medical History:   Diagnosis Date    CAD (coronary artery disease)     Cerebral artery occlusion with cerebral infarction ([facility] [address] 2016    left frontal lobe    Diabetes mellitus, type 2 ([address]     Diverticulitis     Hyperlipidemia     Osteoarthritis     Positive PPD, treated     treated with INH    Prostate cancer ([facility] [address]     Frandy Heredia active surveillance       Past Surgical History:   Procedure Laterality Date    APPENDECTOMY  1969    CORONARY ARTERY BYPASS GRAFT  2016    3V    EYE SURGERY Left     cataract surgery-right    HERNIA REPAIR Bilateral     right ing, left ing    KNEE ARTHROPLASTY Left 14    Left total knee replacement    ROTATOR CUFF REPAIR  ,     right, left  TONSILLECTOMY  1958       Family History   Problem Relation Age of Onset    COPD Father     Diabetes Father     Heart Attack Father     Cancer Father 70        Prostate    Heart Disease Father     Diabetes Brother        CareTeam (Including outside providers/suppliers regularly involved in providing care):   Patient Care Team:  Caleb Bean MD as PCP - Dariel Brooks MD as PCP - [facility] Empaneled Provider  Arnaldo Young MD as Consulting Physician (Urology)  Anuj Madrigal (Internal Medicine)    Wt Readings from Last 3 Encounters:   01/08/21 187 lb (84.8 kg)   07/31/20 197 lb (89.4 kg)   07/10/20 193 lb (87.5 kg)     Vitals:    01/08/21 0958 01/08/21 1041   BP: (!) 140/86 134/74   Site: Right Upper Arm    Position: Sitting    Cuff Size: Large Adult    Pulse: 64    Weight: 187 lb (84.8 kg)    Height: 5' 7.25\" (1.708 m)      Body mass index is 29.07 kg/m². Based upon direct observation of the patient, evaluation of cognition reveals recent and remote memory intact. Physical Exam  Constitutional:       General: He is not in acute distress. Appearance: He is well-developed. Eyes:      Conjunctiva/sclera: Conjunctivae normal.   Neck:      Thyroid: No thyroid mass or thyromegaly. Vascular: No carotid bruit. Cardiovascular:      Rate and Rhythm: Normal rate and regular rhythm. Heart sounds: Normal heart sounds. No murmur. No friction rub. No gallop. Pulmonary:      Effort: Pulmonary effort is normal. No respiratory distress. Breath sounds: Normal breath sounds. No wheezing or rales. Abdominal:      General: Bowel sounds are normal. There is no distension. Palpations: Abdomen is soft. Tenderness: There is no abdominal tenderness. Musculoskeletal:      Right lower leg: No edema. Left lower leg: No edema. Skin:     General: Skin is warm and dry. Findings: No erythema or rash.    Neurological:      Mental Status: He is alert and oriented to person, place, and time. Cranial Nerves: No cranial nerve deficit. Sensory: No sensory deficit. Coordination: Coordination normal.      Comments: Neuro exam deferred to neurologist   Psychiatric:         Behavior: Behavior normal.         Thought Content: Thought content normal.         Judgment: Judgment normal.        Patient's complete Health Risk Assessment and screening values have been reviewed and are found in Flowsheets. The following problems were reviewed today and where indicated follow up appointments were made and/or referrals ordered. Positive Risk Factor Screenings with Interventions:          General Health and ACP:  General  In general, how would you say your health is?: Fair  In the past 7 days, have you experienced any of the following?  New or Increased Pain, New or Increased Fatigue, Loneliness, Social Isolation, Stress or Anger?: None of These  Do you get the social and emotional support that you need?: Yes  Do you have a Living Will?: Yes  Advance Directives     Power of  Living Will ACP-Advance Directive ACP-Power of     Not on File Not on File Not on File Not on File      General Health Risk Interventions:  · No Living Will: ACP documents already completed- patient asked to provide copy to the office      Safety:  Safety  Do you have working smoke detectors?: Yes  Have all throw rugs been removed or fastened?: (!) No  Do you have non-slip mats or surfaces in all bathtubs/showers?: Yes  Do all of your stairways have a railing or banister?: Yes  Are your doorways, halls and stairs free of clutter?: Yes  Do you always fasten your seatbelt when you are in a car?: Yes  Safety Interventions:  · Home safety tips provided     Personalized Preventive Plan   Current Health Maintenance Status  Immunization History   Administered Date(s) Administered    COVID-19, Moderna, 100mcg/0.5ml 01/03/2021    Pneumococcal Conjugate 13-valent ([id number]) 04/06/2018    Td, unspecified formulation 10/27/2003    Tdap (Boostrix, Adacel) 05/23/2018        Health Maintenance   Topic Date Due    PSA counseling  09/23/2014    Pneumococcal 65+ years Vaccine (2 of 2 - PPSV23) 04/06/2019    Annual Wellness Visit (AWV)  05/29/2019    Diabetic retinal exam  12/30/2020    Diabetic foot exam  07/10/2021    A1C test (Diabetic or Prediabetic)  07/15/2021    Lipid screen  07/15/2021    Potassium monitoring  07/15/2021    Creatinine monitoring  07/15/2021    DTaP/Tdap/Td vaccine (2 - Td) 05/23/2028    Colon cancer screen colonoscopy  09/16/2029    Hepatitis A vaccine  Aged Out    Hib vaccine  Aged Out    Meningococcal (ACWY) vaccine  Aged Out    Hepatitis C screen  Discontinued     Recommendations for iSites Due: see orders and patient instructions/AVS.  . Recommended screening schedule for the next 5-10 years is provided to the patient in written form: see Patient Instructions/AVS.    Assessment/Plan  1. Routine general medical examination at a health care facility  Comments:  Since received COVID-19 vaccine on 1/3, will hold off on Pneumovax until at least 2 weeks after second COVID vaccine. 2. Type 2 diabetes mellitus with stage 3a chronic kidney disease, without long-term current use of insulin ([facility] [address]  Assessment & Plan: At HgbA1c goal of < 8.0 per endocrinology (7.5- 10/28/20). Continue current medications and follow up as directed with specialist.  He was reminded to schedule his diabetic eye exam.  3. Hypothyroidism due to acquired atrophy of thyroid  Assessment & Plan:  Euthyroid per recent labs with endocrinologist (TSH 1.42- 10/28/20). Continue current dose of Synthroid and regular follow up with endocrinology. 4. Prostate cancer [facility])  Assessment & Plan:  Saw Dr. Devi Donaldson last month for ROMEL and repeat PSA, which was 3.69. Recommended yearly surveillance unless new symptoms develop. 5. Pure hypercholesterolemia  -     Lipid, Fasting; Future  6.  Essential hypertension  -     Comprehensive Metabolic Panel, Fasting; Future    Return in about 6 months (around 7/8/2021) for 30 MIN F/U.

## 2021-01-08 NOTE — ASSESSMENT & PLAN NOTE
Saw Dr. Raymundo Penn last month for ROMEL and repeat PSA, which was 3.69. Recommended yearly surveillance unless new symptoms develop.

## 2021-04-17 NOTE — Clinical Note
Dr. Baldwin Solan you for your referral for audiometric testing on this patient. Please see the scanned audiogram (under \"Media\" tab) and encounter note for details. He is interested in following-up with the [facility] for further audiologic management. 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 I recommended that he follow-up with an ENT in our office to determine if there may be medical cause to the asymmetry. If you have any questions, or if there is anything else you need, please let me know. Hal hCurch [id number] E[facility] - Audiology
Initial (On Arrival)

## 2021-04-21 RX ORDER — LEVOTHYROXINE SODIUM 0.07 MG/1
TABLET ORAL
Qty: 90 TABLET | Refills: 1 | Status: SHIPPED | OUTPATIENT
Start: 2021-04-21 | End: 2021-11-01

## 2021-04-22 RX ORDER — ATORVASTATIN CALCIUM 40 MG/1
TABLET, FILM COATED ORAL
Qty: 90 TABLET | Refills: 1 | Status: SHIPPED | OUTPATIENT
Start: 2021-04-22 | End: 2021-11-01

## 2021-04-22 NOTE — TELEPHONE ENCOUNTER
Last appointment: 1/8/2021  Next appointment: 7/8/2021  Last refill:   Atorvastatin 90 with 1 10/26/2020

## 2021-04-29 LAB
AVERAGE GLUCOSE: NORMAL
HBA1C MFR BLD: 7.2 %

## 2021-07-07 PROBLEM — G20.C PARKINSONISM: Status: ACTIVE | Noted: 2018-04-06

## 2021-07-07 PROBLEM — G20 PARKINSONISM (HCC): Status: ACTIVE | Noted: 2018-04-06

## 2021-07-07 NOTE — PROGRESS NOTES
Date of Visit:  2021    CC: Karla Meeks (: 1945) is a 76 y.o. male, established patient, here for evaluation/re-evaluation of the following medical concerns:    ASSESSMENT/PLAN:  Type 2 diabetes mellitus with stage 3a chronic kidney disease, without long-term current use of insulin ([facility]  Assessment & Plan:  Diabetes well-controlled per recent HgbA1c. Continue regular follow up with endocrinology. Kidney function stable. He will continue to avoid NSAIDs. Orders:  -      DIABETES FOOT EXAM  Essential hypertension  Assessment & Plan:  Well-controlled. Continue current antihypertensive regimen. Pure hypercholesterolemia  Assessment & Plan:  At goal on current dose of Lipitor- continue. Hypothyroidism due to acquired atrophy of thyroid  Assessment & Plan:  Fatigue is likely multifactorial- last TSH normal, so will continue current dose of Synthroid. He will have this redrawn at upcoming endocrinology appointment. Prostate cancer [facility])  Assessment & Plan:  PSA stable, but he was encouraged to follow up with Dr. Itz Bond for active surveillance. Primary parkinsonism ([facility]  Assessment & Plan:  Stable on current dose of Synthroid. Continue regular follow up with neurology. He was encouraged to resume boxing classes once ROSS has been evaluated. ROSS (dyspnea on exertion)  Assessment & Plan:  Given cardiac history, stable angina until proven otherwise. He will call his cardiologist for urgent evaluation. If negative, will initiate pulmonary evaluation. He will call  if symptoms occur at rest or are associated with CP, palpitations, dizziness, nausea or diaphoresis. Return in about 6 months (around 2022) for MEDICARE AW. Vitals:    21 1301   BP: 122/64   Pulse: 72   Resp: 16   SpO2: 98%   Weight: 186 lb (84.4 kg)   Height: 5' 9\" (1.753 m)      Estimated body mass index is 27.47 kg/m² as calculated from the following:    Height as of this encounter: 5' 9\" (1.753 m). Weight as of this encounter: 186 lb (84.4 kg). Wt Readings from Last 3 Encounters:   07/09/21 186 lb (84.4 kg)   01/08/21 187 lb (84.8 kg)   07/31/20 197 lb (89.4 kg)     BP Readings from Last 3 Encounters:   07/09/21 122/64   01/08/21 134/74   07/31/20 138/74     HPI  Diabetes Mellitus Type 2: Current symptoms/problems include none. Last HgbA1c 7.2- 4/29/21 per endocrinology. Home blood sugar records: fasting range: 140s  Any episodes of hypoglycemia? no  Daily Aspirin? Yes  Diet/exercise: Has lost about 10 pounds over the past year- staying as active as possible. No particular diet, but trying to avoid simple carbs. Hypertension/CKD:  Home blood pressure monitoring: No.  He is adherent to a low sodium diet. Patient denies chest pain, shortness of breath, headache, and palpitations. Has occasional ortostatic symptoms. Antihypertensive medication side effects: no medication side effects noted. Use of agents associated with hypertension: none. GFR 59- 4/8/21. Hyperlipidemia:  No new myalgias or GI upset on atorvastatin (Lipitor). LDL 40, triglycerides 55, HDL 58- 4/8/21. Hypothyroidism: Recent symptoms: Fatigue. He denies cold intolerance, heat intolerance, constipation, diarrhea and palpitations. Patient is taking his medication consistently on an empty stomach. Last TSH 1.42- 10/28/20. Prostate cancer:  Last saw Dr. Octavia Linton in 2013- recommended active surveillance, but did not follow up. No urinary symptoms. PSA 3.69- 12/20. Parkinson's Dz: Most tremors in left hand, having more unsteadiness, but no falls. Sleeping well. Seeing Dr. Ludwin Henry at [address] [place] [address] [address]. Tolerating current dose of Sinemet. Has been participating in [employer] 1x/week. ROS  As documented above  1 year history of ROSS- occurs 5-10 minutes after moderate activity, can get better if keeps walking.   CABG 2016 triple vessel- no stress test or angiogram since surgery- Sees Dr. Martin Nicholson at [place], last seen 8/20. Symptoms not changing over time. No symptoms at rest.    Physical Exam  Constitutional:       General: He is not in acute distress. Appearance: He is well-developed. Eyes:      Conjunctiva/sclera: Conjunctivae normal.   Neck:      Thyroid: No thyroid mass or thyromegaly. Vascular: No carotid bruit. Cardiovascular:      Rate and Rhythm: Normal rate and regular rhythm. Heart sounds: Normal heart sounds. No murmur heard. No friction rub. No gallop. Pulmonary:      Effort: Pulmonary effort is normal. No respiratory distress. Breath sounds: Normal breath sounds. No wheezing or rales. Abdominal:      General: Bowel sounds are normal. There is no distension. Palpations: Abdomen is soft. Tenderness: There is no abdominal tenderness. Musculoskeletal:      Right lower leg: No edema. Left lower leg: No edema. Skin:     General: Skin is warm and dry. Findings: No erythema or rash. Neurological:      Mental Status: He is alert and oriented to person, place, and time. Cranial Nerves: No cranial nerve deficit. Sensory: No sensory deficit. Coordination: Coordination normal.      Comments: Coarse resting tremor left hand. Detailed neuro exam deferred to neurologist.    Psychiatric:         Behavior: Behavior normal.         Thought Content:  Thought content normal.         Judgment: Judgment normal.     DFE:    Visual inspection:  Deformity/amputation: absent  Skin lesions/pre-ulcerative calluses: absent  Edema: right- negative, left- negative    Sensory exam:  Monofilament sensation: normal  (minimum of 5 random plantar locations tested, avoiding callused areas - > 1 area with absence of sensation is + for neuropathy)    Plus at least one of the following:  Pulses: normal,   Pinprick: N/A  Proprioception: N/A  Vibration (128 Hz): N/A     On this date 7/9/2021 I have spent 35 minutes reviewing previous notes, test results and face to face with the patient discussing the diagnosis and importance of compliance with the treatment plan as well as documenting on the day of the visit. --Mine Wyatt MD on 7/9/2021 at 2:15 PM    An electronic signature was used to authenticate this note.

## 2021-07-09 ENCOUNTER — OFFICE VISIT (OUTPATIENT)
Dept: INTERNAL MEDICINE CLINIC | Age: 76
End: 2021-07-09
Payer: MEDICARE

## 2021-07-09 VITALS
SYSTOLIC BLOOD PRESSURE: 122 MMHG | DIASTOLIC BLOOD PRESSURE: 64 MMHG | HEIGHT: 69 IN | WEIGHT: 186 LBS | BODY MASS INDEX: 27.55 KG/M2 | OXYGEN SATURATION: 98 % | RESPIRATION RATE: 16 BRPM | HEART RATE: 72 BPM

## 2021-07-09 DIAGNOSIS — R06.09 DOE (DYSPNEA ON EXERTION): ICD-10-CM

## 2021-07-09 DIAGNOSIS — E11.22 TYPE 2 DIABETES MELLITUS WITH STAGE 3A CHRONIC KIDNEY DISEASE, WITHOUT LONG-TERM CURRENT USE OF INSULIN (HCC): Primary | ICD-10-CM

## 2021-07-09 DIAGNOSIS — G20 PRIMARY PARKINSONISM (HCC): ICD-10-CM

## 2021-07-09 DIAGNOSIS — E03.4 HYPOTHYROIDISM DUE TO ACQUIRED ATROPHY OF THYROID: ICD-10-CM

## 2021-07-09 DIAGNOSIS — I10 ESSENTIAL HYPERTENSION: ICD-10-CM

## 2021-07-09 DIAGNOSIS — E78.00 PURE HYPERCHOLESTEROLEMIA: ICD-10-CM

## 2021-07-09 DIAGNOSIS — N18.31 TYPE 2 DIABETES MELLITUS WITH STAGE 3A CHRONIC KIDNEY DISEASE, WITHOUT LONG-TERM CURRENT USE OF INSULIN (HCC): Primary | ICD-10-CM

## 2021-07-09 DIAGNOSIS — C61 PROSTATE CANCER (HCC): ICD-10-CM

## 2021-07-09 PROCEDURE — 90732 PPSV23 VACC 2 YRS+ SUBQ/IM: CPT | Performed by: INTERNAL MEDICINE

## 2021-07-09 PROCEDURE — 3051F HG A1C>EQUAL 7.0%<8.0%: CPT | Performed by: INTERNAL MEDICINE

## 2021-07-09 PROCEDURE — 99214 OFFICE O/P EST MOD 30 MIN: CPT | Performed by: INTERNAL MEDICINE

## 2021-07-09 PROCEDURE — G0009 ADMIN PNEUMOCOCCAL VACCINE: HCPCS | Performed by: INTERNAL MEDICINE

## 2021-07-09 SDOH — ECONOMIC STABILITY: FOOD INSECURITY: WITHIN THE PAST 12 MONTHS, THE FOOD YOU BOUGHT JUST DIDN'T LAST AND YOU DIDN'T HAVE MONEY TO GET MORE.: NEVER TRUE

## 2021-07-09 SDOH — ECONOMIC STABILITY: TRANSPORTATION INSECURITY
IN THE PAST 12 MONTHS, HAS LACK OF TRANSPORTATION KEPT YOU FROM MEETINGS, WORK, OR FROM GETTING THINGS NEEDED FOR DAILY LIVING?: NO

## 2021-07-09 SDOH — ECONOMIC STABILITY: INCOME INSECURITY: IN THE LAST 12 MONTHS, WAS THERE A TIME WHEN YOU WERE NOT ABLE TO PAY THE MORTGAGE OR RENT ON TIME?: NO

## 2021-07-09 SDOH — ECONOMIC STABILITY: FOOD INSECURITY: WITHIN THE PAST 12 MONTHS, YOU WORRIED THAT YOUR FOOD WOULD RUN OUT BEFORE YOU GOT MONEY TO BUY MORE.: NEVER TRUE

## 2021-07-09 SDOH — ECONOMIC STABILITY: TRANSPORTATION INSECURITY
IN THE PAST 12 MONTHS, HAS THE LACK OF TRANSPORTATION KEPT YOU FROM MEDICAL APPOINTMENTS OR FROM GETTING MEDICATIONS?: NO

## 2021-07-09 SDOH — ECONOMIC STABILITY: HOUSING INSECURITY
IN THE LAST 12 MONTHS, WAS THERE A TIME WHEN YOU DID NOT HAVE A STEADY PLACE TO SLEEP OR SLEPT IN A SHELTER (INCLUDING NOW)?: NO

## 2021-07-09 ASSESSMENT — SOCIAL DETERMINANTS OF HEALTH (SDOH): HOW HARD IS IT FOR YOU TO PAY FOR THE VERY BASICS LIKE FOOD, HOUSING, MEDICAL CARE, AND HEATING?: NOT HARD AT ALL

## 2021-07-09 NOTE — ASSESSMENT & PLAN NOTE
Fatigue is likely multifactorial- last TSH normal, so will continue current dose of Synthroid. He will have this redrawn at upcoming endocrinology appointment.

## 2021-07-09 NOTE — ASSESSMENT & PLAN NOTE
Stable on current dose of Synthroid. Continue regular follow up with neurology. He was encouraged to resume boxing classes once ROSS has been evaluated.

## 2021-09-28 NOTE — TELEPHONE ENCOUNTER
Patients wife called to have the following medication sent to their pharmacy they will need a new prescription if possible due to the fact that this ended in 2019. He uses it for his skin tares. mupirocin (BACTROBAN) 2 % ointment          [id number] [place] [facility] [address]  Adelina Mccarthy [phone] - F [phone]

## 2021-11-01 RX ORDER — ATORVASTATIN CALCIUM 40 MG/1
TABLET, FILM COATED ORAL
Qty: 90 TABLET | Refills: 1 | Status: SHIPPED | OUTPATIENT
Start: 2021-11-01 | End: 2022-04-17

## 2021-11-01 RX ORDER — LEVOTHYROXINE SODIUM 0.07 MG/1
TABLET ORAL
Qty: 90 TABLET | Refills: 1 | Status: SHIPPED | OUTPATIENT
Start: 2021-11-01 | End: 2022-04-17

## 2021-11-10 NOTE — PROGRESS NOTES
Date of Visit:  2021    CC: Daniel Lundberg (: 1945) is a 68 y.o. male, established patient, here for evaluation/re-evaluation of the following medical concerns:    ASSESSMENT/PLAN:  Dizziness  Assessment & Plan:  Worse with recent addition of Aricept to his regimen, so suspect that this is at least a contributing factor. However, B12 deficiency and dysautonomia related to Parkinson's are also high on the differential.  Based upon recent cardiac workup, ischemia and arrhythmia unlikely, but he will continue regular follow up with cardiology. No evidence of metabolic etiology per recent labs. Will administer B12 injection today- if no significant improvement within 1 week, he will discontinue Aricept. He will continue to push fluids to help minimize any orthostatic symptoms. Orders:  -     EKG 12 lead; Future     Follow up 22- as scheduled    Vitals:    21 1029   BP: (!) 140/72   Pulse: 61   Resp: 16   SpO2: 98%   Weight: 180 lb (81.6 kg)   Height: 5' 8\" (1.727 m)      Estimated body mass index is 27.37 kg/m² as calculated from the following:    Height as of this encounter: 5' 8\" (1.727 m). Weight as of this encounter: 180 lb (81.6 kg). Wt Readings from Last 3 Encounters:   21 180 lb (81.6 kg)   21 186 lb (84.4 kg)   21 187 lb (84.8 kg)     BP Readings from Last 3 Encounters:   21 (!) 140/72   21 122/64   21 134/74     HPI  Dizziness: Patient complains of a 1 month history of lightheadedness which is associated with position change and a sense of imbalance. Symptoms are intermittent, occuring a few times per week, and lasting a few seconds per episode. Overall, the symptoms have been worsening over time. Symptoms are exacerbated by rising from supine or seated position. Associated symptoms:  none.  He denies ear pain/pressure decreased hearing tinnitus upper respiratory symptoms fevers/night sweats visual change headaches nausea/vomiting cognitive/personality change paresthesias weakness fatigue/lethargy pre-syncope loss of consciousness. Relevant PMH: CVA/TIA- remote, Parkinson's disease and coronary artery disease- recent cardiac evaluation, including angiogram stable. Recent medication changes:  Aricept added by neurology- shortly before onset. He has been treated with nothing. Previous work up:  angiogram- cardiac cath 7/30/21- bypass grafts patent- medical management recommended. Recent labs per endocrinology showed HgbA1c of 7.4, vitamin B12 level of 278- taking 1000 mcg orally qd- switched to SL by endo, but has not started, normal TSH, normal CMP except glucose of 242. Staying well hydrated. ROS  As documented above    Physical Exam  Constitutional:       General: He is not in acute distress. Appearance: He is well-developed. HENT:      Head: Normocephalic and atraumatic. Right Ear: Hearing, tympanic membrane and ear canal normal.      Left Ear: Hearing, tympanic membrane and ear canal normal.   Eyes:      Extraocular Movements:      Right eye: No nystagmus. Left eye: No nystagmus. Conjunctiva/sclera: Conjunctivae normal.      Pupils: Pupils are equal, round, and reactive to light. Neck:      Thyroid: No thyromegaly. Cardiovascular:      Rate and Rhythm: Normal rate and regular rhythm. Heart sounds: Normal heart sounds. No murmur heard. No friction rub. No gallop. Pulmonary:      Effort: Pulmonary effort is normal. No respiratory distress. Breath sounds: Normal breath sounds. No wheezing or rales. Musculoskeletal:      Cervical back: Neck supple. No spinous process tenderness or muscular tenderness. Lymphadenopathy:      Cervical: No cervical adenopathy. Skin:     General: Skin is warm and dry. Findings: No erythema or rash. Neurological:      Mental Status: He is alert and oriented to person, place, and time. Cranial Nerves: No cranial nerve deficit.       Sensory: No sensory

## 2021-11-12 ENCOUNTER — OFFICE VISIT (OUTPATIENT)
Dept: INTERNAL MEDICINE CLINIC | Age: 76
End: 2021-11-12
Payer: MEDICARE

## 2021-11-12 VITALS
RESPIRATION RATE: 16 BRPM | HEART RATE: 61 BPM | DIASTOLIC BLOOD PRESSURE: 72 MMHG | SYSTOLIC BLOOD PRESSURE: 140 MMHG | OXYGEN SATURATION: 98 % | HEIGHT: 68 IN | WEIGHT: 180 LBS | BODY MASS INDEX: 27.28 KG/M2

## 2021-11-12 DIAGNOSIS — R42 DIZZINESS: Primary | ICD-10-CM

## 2021-11-12 PROBLEM — E53.8 VITAMIN B12 DEFICIENCY: Status: ACTIVE | Noted: 2021-11-12

## 2021-11-12 PROCEDURE — 96372 THER/PROPH/DIAG INJ SC/IM: CPT | Performed by: INTERNAL MEDICINE

## 2021-11-12 PROCEDURE — 99214 OFFICE O/P EST MOD 30 MIN: CPT | Performed by: INTERNAL MEDICINE

## 2021-11-12 PROCEDURE — 93000 ELECTROCARDIOGRAM COMPLETE: CPT | Performed by: INTERNAL MEDICINE

## 2021-11-12 RX ORDER — MAGNESIUM 200 MG
1 TABLET ORAL DAILY
COMMUNITY
Start: 2021-11-12

## 2021-11-12 RX ORDER — DONEPEZIL HYDROCHLORIDE 10 MG/1
10 TABLET, FILM COATED ORAL NIGHTLY
COMMUNITY
End: 2022-01-14

## 2021-11-12 RX ORDER — CYANOCOBALAMIN 1000 UG/ML
1000 INJECTION INTRAMUSCULAR; SUBCUTANEOUS ONCE
Status: COMPLETED | OUTPATIENT
Start: 2021-11-12 | End: 2021-11-12

## 2021-11-12 RX ADMIN — CYANOCOBALAMIN 1000 MCG: 1000 INJECTION INTRAMUSCULAR; SUBCUTANEOUS at 11:45

## 2021-11-12 NOTE — PATIENT INSTRUCTIONS
Wait 2-3 days after B12 injection to see if dizziness and balance improve. If not, stop the Aricept.

## 2021-11-13 NOTE — ASSESSMENT & PLAN NOTE
Worse with recent addition of Aricept to his regimen, so suspect that this is at least a contributing factor. However, B12 deficiency and dysautonomia related to Parkinson's are also high on the differential.  Based upon recent cardiac workup, ischemia and arrhythmia unlikely, but he will continue regular follow up with cardiology. No evidence of metabolic etiology per recent labs. Will administer B12 injection today- if no significant improvement within 1 week, he will discontinue Aricept. He will continue to push fluids to help minimize any orthostatic symptoms.

## 2021-11-16 ENCOUNTER — PATIENT MESSAGE (OUTPATIENT)
Dept: INTERNAL MEDICINE CLINIC | Age: 76
End: 2021-11-16

## 2021-11-17 ENCOUNTER — HOSPITAL ENCOUNTER (EMERGENCY)
Age: 76
Discharge: HOME | End: 2021-11-17
Attending: EMERGENCY MEDICINE
Payer: MEDICARE

## 2021-11-17 ENCOUNTER — APPOINTMENT (OUTPATIENT)
Dept: GENERAL RADIOLOGY | Age: 76
End: 2021-11-17
Payer: MEDICARE

## 2021-11-17 VITALS
RESPIRATION RATE: 18 BRPM | TEMPERATURE: 97.4 F | HEART RATE: 59 BPM | DIASTOLIC BLOOD PRESSURE: 70 MMHG | OXYGEN SATURATION: 98 % | SYSTOLIC BLOOD PRESSURE: 179 MMHG

## 2021-11-17 DIAGNOSIS — R11.2 NON-INTRACTABLE VOMITING WITH NAUSEA, UNSPECIFIED VOMITING TYPE: Primary | ICD-10-CM

## 2021-11-17 LAB
A/G RATIO: 1.6 (ref 1.1–2.2)
ALBUMIN SERPL-MCNC: 4.2 G/DL (ref 3.4–5)
ALP BLD-CCNC: 82 U/L (ref 40–129)
ALT SERPL-CCNC: <5 U/L (ref 10–40)
ANION GAP SERPL CALCULATED.3IONS-SCNC: 14 MMOL/L (ref 3–16)
AST SERPL-CCNC: 11 U/L (ref 15–37)
BASE EXCESS VENOUS: 2.3 MMOL/L
BASOPHILS ABSOLUTE: 0 K/UL (ref 0–0.2)
BASOPHILS RELATIVE PERCENT: 0.3 %
BILIRUB SERPL-MCNC: 0.7 MG/DL (ref 0–1)
BILIRUBIN URINE: NEGATIVE
BLOOD, URINE: NEGATIVE
BUN BLDV-MCNC: 15 MG/DL (ref 7–20)
CALCIUM SERPL-MCNC: 9.9 MG/DL (ref 8.3–10.6)
CARBOXYHEMOGLOBIN: 1 %
CHLORIDE BLD-SCNC: 100 MMOL/L (ref 99–110)
CLARITY: CLEAR
CO2: 23 MMOL/L (ref 21–32)
COLOR: YELLOW
CREAT SERPL-MCNC: 1 MG/DL (ref 0.8–1.3)
EOSINOPHILS ABSOLUTE: 0 K/UL (ref 0–0.6)
EOSINOPHILS RELATIVE PERCENT: 0.5 %
GFR AFRICAN AMERICAN: >60
GFR NON-AFRICAN AMERICAN: >60
GLUCOSE BLD-MCNC: 165 MG/DL (ref 70–99)
GLUCOSE BLD-MCNC: 183 MG/DL (ref 70–99)
GLUCOSE URINE: >=1000 MG/DL
HCO3 VENOUS: 27 MMOL/L (ref 23–29)
HCT VFR BLD CALC: 40.6 % (ref 40.5–52.5)
HEMOGLOBIN: 13.2 G/DL (ref 13.5–17.5)
KETONES, URINE: 15 MG/DL
LEUKOCYTE ESTERASE, URINE: NEGATIVE
LIPASE: 37 U/L (ref 13–60)
LYMPHOCYTES ABSOLUTE: 0.6 K/UL (ref 1–5.1)
LYMPHOCYTES RELATIVE PERCENT: 14 %
MCH RBC QN AUTO: 32.8 PG (ref 26–34)
MCHC RBC AUTO-ENTMCNC: 32.6 G/DL (ref 31–36)
MCV RBC AUTO: 100.5 FL (ref 80–100)
METHEMOGLOBIN VENOUS: 0.7 %
MICROSCOPIC EXAMINATION: ABNORMAL
MONOCYTES ABSOLUTE: 0.3 K/UL (ref 0–1.3)
MONOCYTES RELATIVE PERCENT: 6 %
NEUTROPHILS ABSOLUTE: 3.5 K/UL (ref 1.7–7.7)
NEUTROPHILS RELATIVE PERCENT: 79.2 %
NITRITE, URINE: NEGATIVE
O2 SAT, VEN: 36 %
O2 THERAPY: NORMAL
PCO2, VEN: 42.6 MMHG (ref 40–50)
PDW BLD-RTO: 13.3 % (ref 12.4–15.4)
PERFORMED ON: ABNORMAL
PH UA: 5.5 (ref 5–8)
PH VENOUS: 7.42 (ref 7.35–7.45)
PLATELET # BLD: 156 K/UL (ref 135–450)
PMV BLD AUTO: 8.1 FL (ref 5–10.5)
PO2, VEN: <30 MMHG
POTASSIUM REFLEX MAGNESIUM: 4.1 MMOL/L (ref 3.5–5.1)
PRO-BNP: 309 PG/ML (ref 0–449)
PROTEIN UA: NEGATIVE MG/DL
RBC # BLD: 4.04 M/UL (ref 4.2–5.9)
SARS-COV-2, NAAT: NOT DETECTED
SODIUM BLD-SCNC: 137 MMOL/L (ref 136–145)
SPECIFIC GRAVITY UA: 1.03 (ref 1–1.03)
TCO2 CALC VENOUS: 29 MMOL/L
TOTAL PROTEIN: 6.8 G/DL (ref 6.4–8.2)
TROPONIN: <0.01 NG/ML
URINE REFLEX TO CULTURE: ABNORMAL
URINE TYPE: ABNORMAL
UROBILINOGEN, URINE: 0.2 E.U./DL
WBC # BLD: 4.4 K/UL (ref 4–11)

## 2021-11-17 PROCEDURE — 6360000002 HC RX W HCPCS: Performed by: EMERGENCY MEDICINE

## 2021-11-17 PROCEDURE — 83690 ASSAY OF LIPASE: CPT

## 2021-11-17 PROCEDURE — 71045 X-RAY EXAM CHEST 1 VIEW: CPT

## 2021-11-17 PROCEDURE — 81003 URINALYSIS AUTO W/O SCOPE: CPT

## 2021-11-17 PROCEDURE — 80053 COMPREHEN METABOLIC PANEL: CPT

## 2021-11-17 PROCEDURE — 96374 THER/PROPH/DIAG INJ IV PUSH: CPT

## 2021-11-17 PROCEDURE — 87635 SARS-COV-2 COVID-19 AMP PRB: CPT

## 2021-11-17 PROCEDURE — 83880 ASSAY OF NATRIURETIC PEPTIDE: CPT

## 2021-11-17 PROCEDURE — 82803 BLOOD GASES ANY COMBINATION: CPT

## 2021-11-17 PROCEDURE — 2580000003 HC RX 258: Performed by: EMERGENCY MEDICINE

## 2021-11-17 PROCEDURE — 99284 EMERGENCY DEPT VISIT MOD MDM: CPT

## 2021-11-17 PROCEDURE — 84484 ASSAY OF TROPONIN QUANT: CPT

## 2021-11-17 PROCEDURE — 6370000000 HC RX 637 (ALT 250 FOR IP): Performed by: EMERGENCY MEDICINE

## 2021-11-17 PROCEDURE — 85025 COMPLETE CBC W/AUTO DIFF WBC: CPT

## 2021-11-17 RX ORDER — ONDANSETRON 2 MG/ML
4 INJECTION INTRAMUSCULAR; INTRAVENOUS ONCE
Status: COMPLETED | OUTPATIENT
Start: 2021-11-17 | End: 2021-11-17

## 2021-11-17 RX ORDER — ONDANSETRON 4 MG/1
4 TABLET, ORALLY DISINTEGRATING ORAL EVERY 8 HOURS PRN
Qty: 20 TABLET | Refills: 0 | Status: SHIPPED | OUTPATIENT
Start: 2021-11-17 | End: 2022-10-06

## 2021-11-17 RX ORDER — ONDANSETRON 4 MG/1
4 TABLET, ORALLY DISINTEGRATING ORAL ONCE
Status: COMPLETED | OUTPATIENT
Start: 2021-11-17 | End: 2021-11-17

## 2021-11-17 RX ORDER — 0.9 % SODIUM CHLORIDE 0.9 %
1000 INTRAVENOUS SOLUTION INTRAVENOUS ONCE
Status: COMPLETED | OUTPATIENT
Start: 2021-11-17 | End: 2021-11-17

## 2021-11-17 RX ORDER — DICYCLOMINE HYDROCHLORIDE 10 MG/1
10 CAPSULE ORAL EVERY 6 HOURS PRN
Qty: 20 CAPSULE | Refills: 0 | Status: SHIPPED | OUTPATIENT
Start: 2021-11-17 | End: 2022-10-06

## 2021-11-17 RX ADMIN — ONDANSETRON 4 MG: 2 INJECTION INTRAMUSCULAR; INTRAVENOUS at 01:46

## 2021-11-17 RX ADMIN — ONDANSETRON 4 MG: 4 TABLET, ORALLY DISINTEGRATING ORAL at 04:19

## 2021-11-17 RX ADMIN — SODIUM CHLORIDE 1000 ML: 9 INJECTION, SOLUTION INTRAVENOUS at 02:11

## 2021-11-17 ASSESSMENT — ENCOUNTER SYMPTOMS
SHORTNESS OF BREATH: 0
WHEEZING: 0
NAUSEA: 1
PHOTOPHOBIA: 0
BACK PAIN: 0
ABDOMINAL PAIN: 0
VOMITING: 1
RHINORRHEA: 0
COLOR CHANGE: 0

## 2021-11-17 NOTE — ED NOTES
Bed: B-04  Expected date: 11/17/21  Expected time: 1:08 AM  Means of arrival: [facility]  Comments:  Leg pain     Trisha Elizondo [facility]  11/17/21 9708

## 2021-11-17 NOTE — ED PROVIDER NOTES
[address]      Pt Name: Landy Wilcox  MRN: [id number]  [place] 1945  Date ofevaluation: 11/17/2021  Provider: David Carmen MD    CHIEF COMPLAINT       Chief Complaint   Patient presents with    Emesis         HISTORY OF PRESENT ILLNESS   (Location/Symptom, Timing/Onset,Context/Setting, Quality, Duration, Modifying Factors, Severity)  Note limiting factors. Landy Wilcox is a 68 y.o. male  who  has a past medical history of CAD (coronary artery disease), Cerebral artery occlusion with cerebral infarction ([address] Diabetes mellitus, type 2 ([facility] [address] Diverticulitis, Hyperlipidemia, Osteoarthritis, Positive PPD, treated, and Prostate cancer ([facility] [address] who presents to the emergency department for evaluation of nausea vomiting and general sensation not feeling well. Patient reports that he has a history of diabetes and Parkinson disease. He states that for the past day has been having multiple episodes of nonbloody nonbilious emesis. Denies abdominal pain fevers chest pains or shortness of breath. He reports he has been having difficulties tolerating p.o. solids and fluids. He denies changes in bowel or urine function. He reports having a similar episode many years previously but is not sure what caused it. He denies a history of DKA. States that his blood glucose has been well controlled recently. He reports that one of his medications was recently changed is not sure which medication it was. HPI    NursingNotes were reviewed. REVIEW OF SYSTEMS    (2-9 systems for level 4, 10 or more for level 5)     Review of Systems   Constitutional: Negative for activity change, chills and fever. HENT: Negative for congestion and rhinorrhea. Eyes: Negative for photophobia and visual disturbance. Respiratory: Negative for shortness of breath and wheezing. Cardiovascular: Negative for palpitations and leg swelling.    Gastrointestinal: Positive for nausea and vomiting. Negative for abdominal pain. Endocrine: Negative for polydipsia and polyuria. Genitourinary: Negative for difficulty urinating and frequency. Musculoskeletal: Negative for back pain and gait problem. Skin: Negative for color change and rash. Neurological: Negative for light-headedness and headaches. Psychiatric/Behavioral: Negative for confusion. The patient is not nervous/anxious. All other systems reviewed and are negative. Except as noted above the remainder of the review of systems was reviewed and negative.        PAST MEDICAL HISTORY     Past Medical History:   Diagnosis Date    CAD (coronary artery disease)     Cerebral artery occlusion with cerebral infarction ([facility] [address] 05/2016    left frontal lobe    Diabetes mellitus, type 2 ([facility] [address]     Diverticulitis     Hyperlipidemia     Osteoarthritis     Positive PPD, treated     treated with INH    Prostate cancer ([facility] [address] 4/13    [facility] active surveillance         SURGICALHISTORY       Past Surgical History:   Procedure Laterality Date    APPENDECTOMY  1969    CORONARY ARTERY BYPASS GRAFT  09/13/2016    3V    EYE SURGERY Left 2004    cataract surgery-right    HERNIA REPAIR Bilateral     right ing, left ing    KNEE ARTHROPLASTY Left 1/8/14    Left total knee replacement    ROTATOR CUFF REPAIR  1995, 2007    right, left    TONSILLECTOMY  1958         CURRENT MEDICATIONS       Previous Medications    ASPIRIN 81 MG EC TABLET    Take 81 mg by mouth nightly    ATORVASTATIN (LIPITOR) 40 MG TABLET    TAKE 1 TABLET BY MOUTH EVERY DAY    CARBIDOPA-LEVODOPA (SINEMET)  MG PER TABLET    Take 2.5 tablets by mouth 3 times daily     CYANOCOBALAMIN (VITAMIN B-12) 1000 MCG SUBL    Place 1 tablet under the tongue daily    DONEPEZIL (ARICEPT) 10 MG TABLET    Take 10 mg by mouth nightly    LEVOTHYROXINE (SYNTHROID) 75 MCG TABLET    TAKE 1 TABLET BY MOUTH EVERY DAY    METFORMIN (GLUCOPHAGE-XR) 500 MG EXTENDED RELEASE TABLET    Take 1,000 mg by mouth 2 times daily     PIOGLITAZONE (ACTOS) 15 MG TABLET    Take 15 mg by mouth daily            Patient has no known allergies. FAMILY HISTORY       Family History   Problem Relation Age of Onset    COPD Father     Diabetes Father     Heart Attack Father     Cancer Father 70        Prostate    Heart Disease Father     Diabetes Brother           SOCIAL HISTORY       Social History     Socioeconomic History    Marital status:      Spouse name: Not on file    Number of children: Not on file    Years of education: Not on file    Highest education level: Not on file   Occupational History    Not on file   Tobacco Use    Smoking status: Never Smoker    Smokeless tobacco: Never Used   Substance and Sexual Activity    Alcohol use: No    Drug use: No    Sexual activity: Yes     Partners: Female   Other Topics Concern    Not on file   Social History Narrative    Not on file     Social Determinants of Health     Financial Resource Strain: Low Risk     Difficulty of Paying Living Expenses: Not hard at all   Food Insecurity: No Food Insecurity    Worried About Running Out of Food in the Last Year: Never true    Jose of Food in the Last Year: Never true   Transportation Needs: No Transportation Needs    Lack of Transportation (Medical): No    Lack of Transportation (Non-Medical):  No   Physical Activity:     Days of Exercise per Week: Not on file    Minutes of Exercise per Session: Not on file   Stress:     Feeling of Stress : Not on file   Social Connections:     Frequency of Communication with Friends and Family: Not on file    Frequency of Social Gatherings with Friends and Family: Not on file    Attends Anabaptist Services: Not on file    Active Member of Clubs or Organizations: Not on file    Attends Club or Organization Meetings: Not on file    Marital Status: Not on file   Intimate Partner Violence:     Fear of Current or Ex-Partner: Not on file   Nannette Sosa Emotionally Abused: Not on file    Physically Abused: Not on file    Sexually Abused: Not on file   Housing Stability: Unknown    Unable to Pay for Housing in the Last Year: No    Number of [place] in the Last Year: Not on file    Unstable Housing in the Last Year: No       SCREENINGS             PHYSICAL EXAM    (up to 7 for level 4, 8 or more for level 5)     ED Triage Vitals [11/17/21 0115]   BP Temp Temp Source Pulse Resp SpO2 Height Weight   (!) 180/69 97.4 °F (36.3 °C) Oral 52 14 100 % -- --       Physical Exam  Vitals and nursing note reviewed. Constitutional:       General: He is not in acute distress. Appearance: He is well-developed. HENT:      Head: Normocephalic and atraumatic. Mouth/Throat:      Mouth: Mucous membranes are moist.      Pharynx: Oropharynx is clear. Eyes:      Extraocular Movements: Extraocular movements intact. Conjunctiva/sclera: Conjunctivae normal.      Pupils: Pupils are equal, round, and reactive to light. Neck:      Trachea: No tracheal deviation. Cardiovascular:      Rate and Rhythm: Normal rate and regular rhythm. Pulmonary:      Effort: Pulmonary effort is normal.      Breath sounds: Normal breath sounds. No wheezing or rales. Abdominal:      General: There is no distension. Palpations: Abdomen is soft. Tenderness: There is no abdominal tenderness. There is no right CVA tenderness, left CVA tenderness, guarding or rebound. Musculoskeletal:         General: No deformity. Normal range of motion. Cervical back: Normal range of motion. Skin:     General: Skin is warm and dry. Capillary Refill: Capillary refill takes less than 2 seconds. Neurological:      General: No focal deficit present. Mental Status: He is alert and oriented to person, place, and time.          RESULTS     EKG: All EKG's are interpreted by the Emergency Department Physician who either signs or Co-signsthis chart in the absence of a cardiologist.    RADIOLOGY:   Shakir Lida such as CT, Ultrasound and MRI are read by the radiologist. Plain radiographic images are visualized and preliminarily interpreted by the emergency physician with the below findings:    Interpretation per the Radiologist below, if available at the time ofthis note:    XR CHEST PORTABLE   Final Result   No acute cardiopulmonary findings.                ED BEDSIDE ULTRASOUND:   Performed by ED Physician - none    LABS:  Labs Reviewed   CBC WITH AUTO DIFFERENTIAL - Abnormal; Notable for the following components:       Result Value    RBC 4.04 (*)     Hemoglobin 13.2 (*)     .5 (*)     Lymphocytes Absolute 0.6 (*)     All other components within normal limits    Narrative:     Performed at:  [facility]  [address] [facility] [address] [address]   Phone [phone]   COMPREHENSIVE METABOLIC PANEL W/ REFLEX TO MG FOR LOW K - Abnormal; Notable for the following components:    Glucose 183 (*)     ALT <5 (*)     AST 11 (*)     All other components within normal limits    Narrative:     Performed at:  [facility]  [address] [address] [address]   Phone [phone]   POCT GLUCOSE - Abnormal; Notable for the following components:    POC Glucose 165 (*)     All other components within normal limits    Narrative:     Performed at:  [facility]  [address] [address] [address]   Phone [phone] [phone]   TROPONIN    Narrative:     Performed at:  [facility]  [address] [address] [address]   Phone [phone]   BRAIN NATRIURETIC PEPTIDE    Narrative:     Performed at:  [facility]  [address] [address] [address]   Phone [phone]   LIPASE    Narrative:     Performed at:  [facility]  [address] [place] [address]   Phone [phone]   BLOOD GAS, VENOUS    Narrative:     Performed at:  [facility]  [address] [place] [address] [address]   Phone [phone]   URINE RT REFLEX TO CULTURE   POCT GLUCOSE       All other labs were within normal range or not returned as of this dictation. EMERGENCY DEPARTMENT COURSE and DIFFERENTIAL DIAGNOSIS/MDM:   Vitals:    Vitals:    11/17/21 0115   BP: (!) 180/69   Pulse: 52   Resp: 14   Temp: 97.4 °F (36.3 °C)   TempSrc: Oral   SpO2: 100%       Patient was given thefollowing medications:  Medications   0.9 % sodium chloride bolus (1,000 mLs IntraVENous New Bag 11/17/21 0211)   ondansetron (ZOFRAN) injection 4 mg (4 mg IntraVENous Given 11/17/21 0146)       ED COURSE & MEDICAL DECISION MAKING    Pertinent Labs & Imaging studies reviewed. (See chart for details)   -  Patient seen and evaluated in the emergency department. -  Triage and nursing notes reviewed and incorporated. -  Old chart records reviewed and incorporated. -  Differential diagnosis includes:Differential diagnosis: Abdominal Aortic Aneurysm, Acute Coronary Syndrome, Ischemic Bowel, Bowel Obstruction (including Gastric Outlet Obstruction), PUD, GERD, Acute Cholecystitis, Pancreatitis, Hepatitis, Colitis, SMA Syndrome, Mesenteric Steal Syndrome, Splanchnic Vein Thrombosis, other    -  Work-up included:  See above  -  ED treatment included: See above  -  Results discussed with patient. Labs show no emergent laboratory maladies. Blood glucose within normal limits no signs of acidosis or anion gap. . Imaging studies show no acute cardiopulmonary disease. Patient tolerating p.o. after symptomatic treatment. He will be provided with antiemetics for home. Patient feels improved on reevaluation. Symptomatic treatment with expectant management discussed with the patient and they and/or family members present are amenable to treatment plan and outpatient follow-up. Strict return precautions were discussed with the patient and those present. They demonstrated understanding of when to return to the emergency department for new or worsening symptoms. .  The patient is agreeable with plan of care and disposition. REASSESSMENT          CRITICAL CARE TIME   Total Critical Care time was 0 minutes, excluding separately reportable procedures. There was a high probability of clinically significant/life threatening deterioration in the patient's condition which required my urgent intervention. CONSULTS:  None    PROCEDURES:  Unless otherwise noted below, none     Procedures    FINAL IMPRESSION      1. Non-intractable vomiting with nausea, unspecified vomiting type          DISPOSITION/PLAN   DISPOSITION        PATIENT REFERREDTO:  No follow-up provider specified.     DISCHARGEMEDICATIONS:  New Prescriptions    No medications on file          (Please note that portions of this note were completed with a voice recognition program.  Efforts were made to edit the dictations but occasionally words are mis-transcribed.)    Mellissa Patel MD (electronically signed)  Attending Emergency Physician          Mellissa Patel MD  11/23/21 9192

## 2021-11-17 NOTE — TELEPHONE ENCOUNTER
Patient's wife Jacinto Barrientos called back to say he got so bad last night they took him to [facility] and they tested him there-he was negative. They instructed him to follow up with PCP but he was just recently here and Jacinto Barrientos wants to know if it is necessary to schedule another appointment? Dr. Antoinette Ramos can look in [facility] system and see what he was treated for at ER. Please call Jacinto Barrientos if they need to schedule. No COVID test needed at this time.

## 2021-11-17 NOTE — TELEPHONE ENCOUNTER
Based upon his test results, I don't think that anything in the ED note will change his follow up. If his symptoms do not resolve with stopping the Aricept, I want to see him back in the office next week. He should call sooner if his symptoms change or worsen.

## 2021-11-17 NOTE — TELEPHONE ENCOUNTER
The ED notes are incomplete, but it looks like they did not find anything to explain his symptoms. If he has not already stopped the Aricept, he should do so now, as N/V is a common side effect.

## 2021-11-23 ENCOUNTER — TELEPHONE (OUTPATIENT)
Dept: INTERNAL MEDICINE CLINIC | Age: 76
End: 2021-11-23

## 2021-11-23 DIAGNOSIS — G20 PRIMARY PARKINSONISM (HCC): Primary | ICD-10-CM

## 2021-11-23 NOTE — TELEPHONE ENCOUNTER
Patient seen 11/17 at [facility] ER for vomiting, stomach pain, achiness. He is felling much better and has an upcoming appointment with Dr. Mellisa Robledo in January. Is it necessary for him to have an ER F/U? If so call him at [phone] to schedule.

## 2021-11-23 NOTE — TELEPHONE ENCOUNTER
Everyone at the [facility] is good, but I think that from a personality standpoint, he should avoid Dr. Jimbo Paredes.

## 2021-11-23 NOTE — TELEPHONE ENCOUNTER
----- Message from [place] sent at 11/23/2021 12:29 PM EST -----  Subject: Message to Provider    QUESTIONS  Information for Provider? Pt needs PCP to call the neurologist office that   Dr Juan Carlos Hughes recommended for PT. Offices says they aren't accepting new pts but   will if the pt PCP calls and speaks with the scheduling staff. Please call   [phone] and choose option 6.   ---------------------------------------------------------------------------  --------------  CALL BACK INFO  What is the best way for the office to contact you? OK to leave message on   voicemail  Preferred Call Back Phone Number? [phone]  ---------------------------------------------------------------------------  --------------  SCRIPT ANSWERS  Relationship to Patient?  Self

## 2021-11-23 NOTE — TELEPHONE ENCOUNTER
Spoke with  physician referral service. Referral placed- needs to be faxed to [phone]. They will notify us when patient can be scheduled for urgent appt.

## 2021-11-23 NOTE — TELEPHONE ENCOUNTER
Germán refax the referral with demographics and insurance information to the fax number below:    [phone]

## 2021-11-24 ENCOUNTER — PATIENT MESSAGE (OUTPATIENT)
Dept: INTERNAL MEDICINE CLINIC | Age: 76
End: 2021-11-24

## 2021-11-24 LAB — PROSTATE SPECIFIC ANTIGEN: 4.3 NG/ML (ref 0–4)

## 2021-11-24 NOTE — TELEPHONE ENCOUNTER
Patient stopped in office and I told him to call  Neurology and take first available per Dr. Berry Husbands and he voiced understanding.

## 2021-12-01 ENCOUNTER — TELEPHONE (OUTPATIENT)
Dept: INTERNAL MEDICINE CLINIC | Age: 76
End: 2021-12-01

## 2021-12-01 NOTE — TELEPHONE ENCOUNTER
I called the  Physician Referral hotline and the representative stated that if I placed a referral for next available provider, they would get him in for urgent appt. Please call  back at [phone] and choose option 6 to ask why they have not called back to offer appointment.

## 2021-12-01 NOTE — TELEPHONE ENCOUNTER
Patient's wife called to double check on who Dr. Antoinette Ramos recommended at [facility]. Referral does not read anyone in particular just that \"Patient with rapidly worsening symptoms, particularly equilibrium. \" Per [employer] message on 11/24, they were advised to take the first available. Wife, Jacinto Barrientos, states the first available is July 1st. Is it ok to wait that long? Can we help get them in sooner?

## 2021-12-01 NOTE — TELEPHONE ENCOUNTER
I spoke to Cuate Good at [facility] who will call me back once she talks to the practice manager in that department

## 2021-12-06 NOTE — TELEPHONE ENCOUNTER
I have already called back and gotten the run around again. The  MD referral number is pretty worthless.

## 2021-12-06 NOTE — TELEPHONE ENCOUNTER
Patient says he is with Yenni Quintana right now and is willing to wait till July to be seen at [facility].   Note routed to Dr Nishant Cárdenas for review

## 2021-12-06 NOTE — TELEPHONE ENCOUNTER
I called again and according to them they have no openings before July. I also left a message with the practice manager and let them know you may be calling to see what you can do since I am having no luck. Routed back to  [address] for [facility]. Left message with practice manager at [phone]. Also spoke to [address] [place] [address] [address] Dr Bridger Francis and his first availability is January 14th.

## 2021-12-07 NOTE — TELEPHONE ENCOUNTER
Nikko Oliver with [facility] is calling back with more information on the Urgent Appointment needed for the patient. Appointment scheduled for 12/10/21 @ 12:30pm with Dr. Jayme Payton at [facility] [address] [place], [place]  [phone]    [facility] for patient's wife to call back. Please provide this information when she returns our call.

## 2022-01-12 NOTE — PROGRESS NOTES
Preoperative Consultation      Tomy Bal  YOB: 1945    Date of Service:  2022    Chief Complaint   Patient presents with    Pre-op Exam     Trus Biopsy of the Prostate by Dr Donavan Thomas [facility] on 2022 F. [phone]       HPI:    Tomy Bal (: 1945) is a 68 y.o. male, established patient, here for a preoperative consultation at the request of surgeon, Dr. Gaby Bales, who plans on performing TRUS prostate biopsy on  at [facility]. Planned anesthesia: IV sedation   Known anesthesia problems: None   Bleeding risk: No recent or remote history of abnormal bleeding  Personal or FH of DVT/PE: No   Recent steroid use: no  Exercise tolerance before surgery at least 4 METS (Can walk up a flight of steps or a hill or walk on level ground at 3 to 4 mph): Yes     Patient Active Problem List   Diagnosis    Hyperlipidemia    [facility]    DMII (diabetes mellitus, type 2) ([facility]    Hypothyroidism due to acquired atrophy of thyroid    History of CVA (cerebrovascular accident)    Primary parkinsonism ([address]    Coronary artery disease involving native coronary artery of native heart without angina pectoris    Essential hypertension    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 HENT: Negative. Eyes: Negative. Respiratory: Negative. Cardiovascular: Negative. Gastrointestinal: Negative. Genitourinary: Negative. Musculoskeletal: Positive for neck pain (Muscular- bilateral). Skin: Negative. Neurological: Negative. Psychiatric/Behavioral: Negative. No Known Allergies  Prior to Visit Medications    Medication Sig Taking?  Authorizing Provider   ondansetron (ZOFRAN ODT) 4 MG disintegrating tablet Take 1 tablet by mouth every 8 hours as needed for Nausea Yes Amanda Lopez MD   dicyclomine (BENTYL) 10 MG capsule Take 1 capsule by mouth every 6 hours as needed (cramps) Yes Amanda Lopez MD   Cyanocobalamin (VITAMIN B-12) 1000 MCG SUBL Place 1 tablet under the tongue daily Yes Tyna Gottron, MD   levothyroxine (SYNTHROID) 75 MCG tablet TAKE 1 TABLET BY MOUTH EVERY DAY Yes Tyna Gottron, MD   atorvastatin (LIPITOR) 40 MG tablet TAKE 1 TABLET BY MOUTH EVERY DAY Yes Tyna Gottron, MD   pioglitazone (ACTOS) 15 MG tablet Take 15 mg by mouth daily Yes Historical Provider, MD   carbidopa-levodopa (SINEMET)  MG per tablet Take 2.5 tablets by mouth 3 times daily  Yes Historical Provider, MD   aspirin 81 MG EC tablet Take 81 mg by mouth nightly Yes Historical Provider, MD   metFORMIN (GLUCOPHAGE-XR) 500 MG extended release tablet Take 1,000 mg by mouth 2 times daily  Yes Historical Provider, MD   levothyroxine (SYNTHROID) 75 MCG tablet TAKE 1 TABLET BY MOUTH EVERY DAY  Tyna Gottron, MD        Past Medical History:   Diagnosis Date    CAD (coronary artery disease)     Cerebral artery occlusion with cerebral infarction ([facility] 05/2016    left frontal lobe    Diabetes mellitus, type 2 ([facility]     Diverticulitis     Hyperlipidemia     Osteoarthritis     Positive PPD, treated     treated with INH    Prostate cancer ([facility] 4/13    Jak Gilmore active surveillance     Past Surgical History:   Procedure Laterality Date    APPENDECTOMY  1969    CORONARY ARTERY BYPASS GRAFT  09/13/2016    3V    EYE SURGERY Left 2004    cataract surgery-right    HERNIA REPAIR Bilateral     right ing, left ing    KNEE ARTHROPLASTY Left 1/8/14    Left total knee replacement    ROTATOR CUFF REPAIR  1995, 2007    right, left    TONSILLECTOMY  1958     Family History   Problem Relation Age of Onset    COPD Father     Diabetes Father     Heart Attack Father     Cancer Father 70        Prostate    Heart Disease Father     Diabetes Brother Social History     Socioeconomic History    Marital status:      Spouse name: Not on file    Number of children: Not on file    Years of education: Not on file    Highest education level: Not on file   Occupational History    Not on file   Tobacco Use    Smoking status: Never Smoker    Smokeless tobacco: Never Used   Substance and Sexual Activity    Alcohol use: No    Drug use: No    Sexual activity: Yes     Partners: Female   Other Topics Concern    Not on file   Social History Narrative    Not on file     Social Determinants of Health     Financial Resource Strain: Low Risk     Difficulty of Paying Living Expenses: Not hard at all   Food Insecurity: No Food Insecurity    Worried About [address] [place] [address] in the Last Year: Never true    Jose of Food in the Last Year: Never true   Transportation Needs: No Transportation Needs    Lack of Transportation (Medical): No    Lack of Transportation (Non-Medical):  No   Physical Activity:     Days of Exercise per Week: Not on file    Minutes of Exercise per Session: Not on file   Stress:     Feeling of Stress : Not on file   Social Connections:     Frequency of Communication with Friends and Family: Not on file    Frequency of Social Gatherings with Friends and Family: Not on file    Attends Roman [employer] Services: Not on file    Active Member of [address] [place] [address] or Organizations: Not on file    Attends Club or Organization Meetings: Not on file    Marital Status: Not on file   Intimate Partner Violence:     Fear of Current or Ex-Partner: Not on file    Emotionally Abused: Not on file    Physically Abused: Not on file    Sexually Abused: Not on file   Housing Stability: Unknown    Unable to Pay for Housing in the Last Year: No    Number of [place] in the Last Year: Not on file    Unstable Housing in the Last Year: No       Vitals:    01/14/22 0918   BP: 132/78   Pulse: 72   Resp: 16   SpO2: 98%   Weight: 181 lb (82.1 kg)   Height: 5' 8\" (1.727 m)      Estimated body mass index is 27.52 kg/m² as calculated from the following:    Height as of this encounter: 5' 8\" (1.727 m). Weight as of this encounter: 181 lb (82.1 kg). Wt Readings from Last 2 Encounters:   01/14/22 181 lb (82.1 kg)   11/12/21 180 lb (81.6 kg)     BP Readings from Last 3 Encounters:   01/14/22 132/78   11/17/21 (!) 179/70   11/12/21 (!) 140/72      Physical Exam  Constitutional:       General: He is not in acute distress. Appearance: He is well-developed. Eyes:      Conjunctiva/sclera: Conjunctivae normal.   Neck:      Thyroid: No thyroid mass or thyromegaly. Vascular: No carotid bruit. Cardiovascular:      Rate and Rhythm: Normal rate and regular rhythm. Heart sounds: Normal heart sounds. No murmur heard. No friction rub. No gallop. Pulmonary:      Effort: Pulmonary effort is normal. No respiratory distress. Breath sounds: Normal breath sounds. No wheezing or rales. Abdominal:      General: Bowel sounds are normal. There is no distension. Palpations: Abdomen is soft. Tenderness: There is no abdominal tenderness. Musculoskeletal:      Right lower leg: No edema. Left lower leg: No edema. Skin:     General: Skin is warm and dry. Findings: No erythema or rash. Neurological:      Mental Status: He is alert and oriented to person, place, and time. Cranial Nerves: No cranial nerve deficit. Sensory: No sensory deficit. Coordination: Coordination normal.      Comments: Coarse resting tremor left hand. Intermittent dyskinesias. Psychiatric:         Behavior: Behavior normal.         Thought Content:  Thought content normal.         Judgment: Judgment normal.        Lab Results   Component Value Date    CHOLFAST 109 04/08/2021    TRIGLYCFAST 55 04/08/2021    HDL 58 04/08/2021    LDLCALC 40 04/08/2021     Lab Results   Component Value Date    GLUF 153 (H) 04/08/2021    LABA1C 7.2 04/29/2021     Lab Results Component Value Date     11/17/2021    K 4.1 11/17/2021    BUN 15 11/17/2021    CREATININE 1.0 11/17/2021    LABGLOM >60 11/17/2021    GFRAA >60 11/17/2021    CALCIUM 9.9 11/17/2021     Lab Results   Component Value Date    ALT <5 (L) 11/17/2021    AST 11 (L) 11/17/2021     Lab Results   Component Value Date    HGB 13.2 (L) 11/17/2021     Lab Results   Component Value Date    TSHREFLEX 10.85 (H) 05/17/2016    TSH 2.71 10/31/2019             Assessment/Plan:     Prostate cancer ([facility]  Emergent procedure No  Active Cardiac Condition (decompensated HF, Arrhythmia, MI <3 weeks, severe valve disease):  No   Risk Level of Procedure Low Risk (endoscopy, superficial skin, breast, ambulatory, or cataract, etc.)  Revised Cardiac Risk Index Risk factors: History of ischemic heart disease  History of cerebrovascular disease    According to the 2014 ACC/AHA pre-operative risk assessment guidelines Alfonso John is a low risk for major cardiac complications during a low risk procedure and may continue as planned. Preoperative workup as follows: BMP, EKG completed 11/12/21  Change in medication regimen before surgery: Take Parkinson's medications and Synthroid on morning of procedure with sip of water. Hold metformin 1 days before surgery, hold ASA 7 days prior to procedure, if necessary  Prophylaxis for cardiac events with perioperative beta-blockers:Not indicated    Deep vein thrombosis prophylaxis: regimen to be chosen by surgical team    Further recommendations requested from consultants: No  Type 2 diabetes mellitus with stage 3a chronic kidney disease, without long-term current use of insulin ([facility]  Assessment & Plan:  Asymptomatic. Last HgbA1c 7.2 per endocrinologist , home BS readings low 100s. Should not interfere with wound healing. Coronary artery disease involving native coronary artery of native heart without angina pectoris  Assessment & Plan:  Asymptomatic on current regimen.   No changes per cardiologist at recent visit 12/3/21. Essential hypertension  Assessment & Plan:  Well-controlled. Continue current antihypertensive regimen. Orders:  -     Basic Metabolic Panel; Future     --Christina Jean MD on 1/14/2022 at 9:59 AM    An electronic signature was used to authenticate this note.

## 2022-01-14 ENCOUNTER — OFFICE VISIT (OUTPATIENT)
Dept: INTERNAL MEDICINE CLINIC | Age: 77
End: 2022-01-14
Payer: MEDICARE

## 2022-01-14 VITALS
HEIGHT: 68 IN | HEART RATE: 72 BPM | WEIGHT: 181 LBS | OXYGEN SATURATION: 98 % | RESPIRATION RATE: 16 BRPM | DIASTOLIC BLOOD PRESSURE: 78 MMHG | BODY MASS INDEX: 27.43 KG/M2 | SYSTOLIC BLOOD PRESSURE: 132 MMHG

## 2022-01-14 DIAGNOSIS — N18.31 TYPE 2 DIABETES MELLITUS WITH STAGE 3A CHRONIC KIDNEY DISEASE, WITHOUT LONG-TERM CURRENT USE OF INSULIN (HCC): ICD-10-CM

## 2022-01-14 DIAGNOSIS — I10 ESSENTIAL HYPERTENSION: ICD-10-CM

## 2022-01-14 DIAGNOSIS — E11.22 TYPE 2 DIABETES MELLITUS WITH STAGE 3A CHRONIC KIDNEY DISEASE, WITHOUT LONG-TERM CURRENT USE OF INSULIN (HCC): ICD-10-CM

## 2022-01-14 DIAGNOSIS — I25.10 CORONARY ARTERY DISEASE INVOLVING NATIVE CORONARY ARTERY OF NATIVE HEART WITHOUT ANGINA PECTORIS: ICD-10-CM

## 2022-01-14 DIAGNOSIS — C61 PROSTATE CANCER (HCC): Primary | ICD-10-CM

## 2022-01-14 PROCEDURE — 99214 OFFICE O/P EST MOD 30 MIN: CPT | Performed by: INTERNAL MEDICINE

## 2022-01-14 ASSESSMENT — ENCOUNTER SYMPTOMS
GASTROINTESTINAL NEGATIVE: 1
RESPIRATORY NEGATIVE: 1
EYES NEGATIVE: 1

## 2022-01-14 ASSESSMENT — PATIENT HEALTH QUESTIONNAIRE - PHQ9
SUM OF ALL RESPONSES TO PHQ QUESTIONS 1-9: 0
SUM OF ALL RESPONSES TO PHQ QUESTIONS 1-9: 0
2. FEELING DOWN, DEPRESSED OR HOPELESS: 0
SUM OF ALL RESPONSES TO PHQ QUESTIONS 1-9: 0
SUM OF ALL RESPONSES TO PHQ9 QUESTIONS 1 & 2: 0
SUM OF ALL RESPONSES TO PHQ QUESTIONS 1-9: 0
1. LITTLE INTEREST OR PLEASURE IN DOING THINGS: 0

## 2022-01-14 NOTE — PATIENT INSTRUCTIONS
Patient Education        Rotator Cuff: Exercises  Introduction  Here are some examples of exercises for you to try. The exercises may be suggested for a condition or for rehabilitation. Start each exercise slowly. Ease off the exercises if you start to have pain. You will be told when to start these exercises and which ones will work best for you. How to do the exercises  Pendulum swing    If you have pain in your back, do not do this exercise. 1. Hold on to a table or the back of a chair with your good arm. Then bend forward a little and let your sore arm hang straight down. This exercise does not use the arm muscles. Rather, use your legs and your hips to create movement that makes your arm swing freely. 2. Use the movement from your hips and legs to guide the slightly swinging arm back and forth like a pendulum (or elephant trunk). Then guide it in circles that start small (about the size of a dinner plate). Make the circles a bit larger each day, as your pain allows. 3. Do this exercise for 5 minutes, 5 to 7 times each day. 4. As you have less pain, try bending over a little farther to do this exercise. This will increase the amount of movement at your shoulder. Posterior stretching exercise    1. Hold the elbow of your injured arm with your other hand. 2. Use your hand to pull your injured arm gently up and across your body. You will feel a gentle stretch across the back of your injured shoulder. 3. Hold for at least 15 to 30 seconds. Then slowly lower your arm. 4. Repeat 2 to 4 times. Up-the-back stretch    Your doctor or physical therapist may want you to wait to do this stretch until you have regained most of your range of motion and strength. You can do this stretch in different ways. Hold any of these stretches for at least 15 to 30 seconds. Repeat them 2 to 4 times. 1. Light stretch: Put your hand in your back pocket. Let it rest there to stretch your shoulder. 2. Moderate stretch:  With your other hand, hold your injured arm (palm outward) behind your back by the wrist. Pull your arm up gently to stretch your shoulder. 3. Advanced stretch: Put a towel over your other shoulder. Put the hand of your injured arm behind your back. Now hold the back end of the towel. With the other hand, hold the front end of the towel in front of your body. Pull gently on the front end of the towel. This will bring your hand farther up your back to stretch your shoulder. Overhead stretch    1. Standing about an arm's length away, grasp onto a solid surface. You could use a countertop, a doorknob, or the back of a sturdy chair. 2. With your knees slightly bent, bend forward with your arms straight. Lower your upper body, and let your shoulders stretch. 3. As your shoulders are able to stretch farther, you may need to take a step or two backward. 4. Hold for at least 15 to 30 seconds. Then stand up and relax. If you had stepped back during your stretch, step forward so you can keep your hands on the solid surface. 5. Repeat 2 to 4 times. Shoulder flexion (lying down)    To make a wand for this exercise, use a piece of PVC pipe or a broom handle with the broom removed. Make the wand about a foot wider than your shoulders. 1. Lie on your back, holding a wand with both hands. Your palms should face down as you hold the wand. 2. Keeping your elbows straight, slowly raise your arms over your head. Raise them until you feel a stretch in your shoulders, upper back, and chest.  3. Hold for 15 to 30 seconds. 4. Repeat 2 to 4 times. Shoulder rotation (lying down)    To make a wand for this exercise, use a piece of PVC pipe or a broom handle with the broom removed. Make the wand about a foot wider than your shoulders. 1. Lie on your back. Hold a wand with both hands with your elbows bent and palms up. 2. Keep your elbows close to your body, and move the wand across your body toward the sore arm.   3. Hold for 8 to 12 side, with your thumb facing up. Raise your arm 60 degrees at the most (shoulder level is 90 degrees). 2. Hold the position for 3 to 5 seconds. Then lower your arm back to your side. If you need to, bring your \"good\" arm across your body and place it under the elbow as you lower your injured arm. Use your good arm to keep your injured arm from dropping down too fast.  3. Repeat 8 to 12 times. 4. When you first start out, don't hold any extra weight in your hand. As you get stronger, you may use a 1-pound to 2-pound dumbbell or a small can of food. Shoulder flexor and extensor exercise    These are isometric exercises. That means you contract your muscles without actually moving. 1. Push forward (flex): Stand facing a wall or doorjamb, about 6 inches or less back. Hold your injured arm against your body. Make a closed fist with your thumb on top. Then gently push your hand forward into the wall with about 25% to 50% of your strength. Don't let your body move backward as you push. Hold for about 6 seconds. Relax for a few seconds. Repeat 8 to 12 times. 2. Push backward (extend): Stand with your back flat against a wall. Your upper arm should be against the wall, with your elbow bent 90 degrees (your hand straight ahead). Push your elbow gently back against the wall with about 25% to 50% of your strength. Don't let your body move forward as you push. Hold for about 6 seconds. Relax for a few seconds. Repeat 8 to 12 times. Scapular exercise: Wall push-ups    This exercise is best done with your fingers somewhat turned out, rather than straight up and down. 1. Stand facing a wall, about 12 inches to 18 inches away. 2. Place your hands on the wall at shoulder height. 3. Slowly bend your elbows and bring your face to the wall. Keep your back and hips straight. 4. Push back to where you started. 5. Repeat 8 to 12 times.   6. When you can do this exercise against a wall comfortably, you can try it against a counter. You can then slowly progress to the end of a couch, then to a sturdy chair, and finally to the floor. Scapular exercise: Retraction    For this exercise, you will need elastic exercise material, such as surgical tubing or Thera-Band. 1. Put the band around a solid object at about waist level. (A bedpost will work well.) Each hand should hold an end of the band. 2. With your elbows at your sides and bent to 90 degrees, pull the band back. Your shoulder blades should move toward each other. Then move your arms back where you started. 3. Repeat 8 to 12 times. 4. If you have good range of motion in your shoulders, try this exercise with your arms lifted out to the sides. Keep your elbows at a 90-degree angle. Raise the elastic band up to about shoulder level. Pull the band back to move your shoulder blades toward each other. Then move your arms back where you started. Internal rotator strengthening exercise    1. Start by tying a piece of elastic exercise material to a doorknob. You can use surgical tubing or Thera-Band. 2. Stand or sit with your shoulder relaxed and your elbow bent 90 degrees. Your upper arm should rest comfortably against your side. Squeeze a rolled towel between your elbow and your body for comfort. This will help keep your arm at your side. 3. Hold one end of the elastic band in the hand of the painful arm. 4. Slowly rotate your forearm toward your body until it touches your belly. Slowly move it back to where you started. 5. Keep your elbow and upper arm firmly tucked against the towel roll or at your side. 6. Repeat 8 to 12 times. External rotator strengthening exercise    1. Start by tying a piece of elastic exercise material to a doorknob. You can use surgical tubing or Thera-Band. (You may also hold one end of the band in each hand.)  2. Stand or sit with your shoulder relaxed and your elbow bent 90 degrees. Your upper arm should rest comfortably against your side.  Squeeze a rolled towel between your elbow and your body for comfort. This will help keep your arm at your side. 3. Hold one end of the elastic band with the hand of the painful arm. 4. Start with your forearm across your belly. Slowly rotate the forearm out away from your body. Keep your elbow and upper arm tucked against the towel roll or the side of your body until you begin to feel tightness in your shoulder. Slowly move your arm back to where you started. 5. Repeat 8 to 12 times. Follow-up care is a key part of your treatment and safety. Be sure to make and go to all appointments, and call your doctor if you are having problems. It's also a good idea to know your test results and keep a list of the medicines you take. Where can you learn more? Go to https://[email or web address].bluebird [email or web address] org and sign in to your [employer] account. Enter Elli Hernandez in the [employer] box to learn more about \"Rotator Cuff: Exercises. \"     If you do not have an account, please click on the \"Sign Up Now\" link. Current as of: July 1, 2021               Content Version: 13.1  © 9647-8402 [employer]. Care instructions adapted under license by [facility] [place] ([facility]). If you have questions about a medical condition or this instruction, always ask your healthcare professional. [address] any warranty or liability for your use of this information.

## 2022-01-14 NOTE — ASSESSMENT & PLAN NOTE
Asymptomatic. Last HgbA1c 7.2 per endocrinologist , home BS readings low 100s. Should not interfere with wound healing.

## 2022-02-08 ENCOUNTER — TELEPHONE (OUTPATIENT)
Dept: INTERNAL MEDICINE CLINIC | Age: 77
End: 2022-02-08

## 2022-02-08 NOTE — TELEPHONE ENCOUNTER
----- Message from Alisia Virgen sent at 2/8/2022  2:32 PM EST -----  Subject: Message to Provider    QUESTIONS  Information for Provider? Patient having a prostate biopsy on 3/2 Needs to   come in for just a EKG.  ---------------------------------------------------------------------------  --------------  CALL BACK INFO  What is the best way for the office to contact you? OK to leave message on   voicemail  Preferred Call Back Phone Number? [phone]  ---------------------------------------------------------------------------  --------------  SCRIPT ANSWERS  Relationship to Patient? Self  (Is the patient requesting to see the provider for a procedure?)?  Yes

## 2022-04-07 LAB — PATHOLOGY/CYTOLOGY REPORT: NORMAL

## 2022-04-17 RX ORDER — ATORVASTATIN CALCIUM 40 MG/1
TABLET, FILM COATED ORAL
Qty: 90 TABLET | Refills: 1 | Status: SHIPPED | OUTPATIENT
Start: 2022-04-17 | End: 2022-10-14

## 2022-04-17 RX ORDER — LEVOTHYROXINE SODIUM 0.07 MG/1
TABLET ORAL
Qty: 90 TABLET | Refills: 1 | Status: SHIPPED | OUTPATIENT
Start: 2022-04-17 | End: 2022-10-14

## 2022-04-25 LAB — PROSTATE SPECIFIC ANTIGEN: 3.58 NG/ML

## 2022-04-26 LAB — PSA, TOTAL: 3.58 NG/ML (ref 0–4)

## 2022-05-04 PROBLEM — M25.511 ACUTE PAIN OF BOTH SHOULDERS: Status: RESOLVED | Noted: 2020-04-30 | Resolved: 2022-05-04

## 2022-05-04 PROBLEM — M25.512 ACUTE PAIN OF BOTH SHOULDERS: Status: RESOLVED | Noted: 2020-04-30 | Resolved: 2022-05-04

## 2022-05-06 ENCOUNTER — TELEPHONE (OUTPATIENT)
Dept: INTERNAL MEDICINE CLINIC | Age: 77
End: 2022-05-06

## 2022-05-06 ENCOUNTER — OFFICE VISIT (OUTPATIENT)
Dept: INTERNAL MEDICINE CLINIC | Age: 77
End: 2022-05-06
Payer: MEDICARE

## 2022-05-06 VITALS
DIASTOLIC BLOOD PRESSURE: 82 MMHG | BODY MASS INDEX: 27.89 KG/M2 | SYSTOLIC BLOOD PRESSURE: 122 MMHG | WEIGHT: 184 LBS | OXYGEN SATURATION: 99 % | HEART RATE: 66 BPM | HEIGHT: 68 IN | RESPIRATION RATE: 16 BRPM

## 2022-05-06 DIAGNOSIS — E11.22 TYPE 2 DIABETES MELLITUS WITH STAGE 3A CHRONIC KIDNEY DISEASE, WITHOUT LONG-TERM CURRENT USE OF INSULIN (HCC): ICD-10-CM

## 2022-05-06 DIAGNOSIS — E03.4 HYPOTHYROIDISM DUE TO ACQUIRED ATROPHY OF THYROID: ICD-10-CM

## 2022-05-06 DIAGNOSIS — I10 ESSENTIAL HYPERTENSION: ICD-10-CM

## 2022-05-06 DIAGNOSIS — M54.2 NECK PAIN ON RIGHT SIDE: ICD-10-CM

## 2022-05-06 DIAGNOSIS — Z00.00 MEDICARE ANNUAL WELLNESS VISIT, SUBSEQUENT: Primary | ICD-10-CM

## 2022-05-06 DIAGNOSIS — E78.00 PURE HYPERCHOLESTEROLEMIA: ICD-10-CM

## 2022-05-06 DIAGNOSIS — N18.31 TYPE 2 DIABETES MELLITUS WITH STAGE 3A CHRONIC KIDNEY DISEASE, WITHOUT LONG-TERM CURRENT USE OF INSULIN (HCC): ICD-10-CM

## 2022-05-06 PROCEDURE — 99213 OFFICE O/P EST LOW 20 MIN: CPT | Performed by: INTERNAL MEDICINE

## 2022-05-06 PROCEDURE — G0439 PPPS, SUBSEQ VISIT: HCPCS | Performed by: INTERNAL MEDICINE

## 2022-05-06 ASSESSMENT — PATIENT HEALTH QUESTIONNAIRE - PHQ9
SUM OF ALL RESPONSES TO PHQ QUESTIONS 1-9: 2
SUM OF ALL RESPONSES TO PHQ QUESTIONS 1-9: 2
1. LITTLE INTEREST OR PLEASURE IN DOING THINGS: 1
SUM OF ALL RESPONSES TO PHQ9 QUESTIONS 1 & 2: 2
SUM OF ALL RESPONSES TO PHQ QUESTIONS 1-9: 2
2. FEELING DOWN, DEPRESSED OR HOPELESS: 1
SUM OF ALL RESPONSES TO PHQ QUESTIONS 1-9: 2

## 2022-05-06 ASSESSMENT — LIFESTYLE VARIABLES: HOW OFTEN DO YOU HAVE A DRINK CONTAINING ALCOHOL: NEVER

## 2022-05-06 NOTE — ASSESSMENT & PLAN NOTE
Last HgbA1c higher at 7.9 per endocrinologist at [facility].  Nateglinide started tid qac, which he is tolerating. He has follow up appointment with endocrinology in about 3 months.

## 2022-05-06 NOTE — PROGRESS NOTES
Medicare Annual Wellness Visit  Jeremy Miramontes  5/6/2022    ASSESSMENT/PLAN   Medicare annual wellness visit, subsequent  Neck pain on right side  Assessment & Plan:  Clinical picture most consistent with trapezius spasm. Will avoid muscle relaxants due increased falls risk. If no significant improvement with PT, will refer for therapeutic massage. Patient will call if symptoms change or worsen. Orders:  -     Diana 18 - MOB  Type 2 diabetes mellitus with stage 3a chronic kidney disease, without long-term current use of insulin ([address]  Assessment & Plan:  Last HgbA1c higher at 7.9 per endocrinologist at [facility].  Nateglinide started tid qac, which he is tolerating. He has follow up appointment with endocrinology in about 3 months. Pure hypercholesterolemia  Assessment & Plan:  Recent LDL 53. Continue current dose of Lipitor. Essential hypertension  Assessment & Plan:  Well-controlled. Continue current antihypertensive regimen. Hypothyroidism due to acquired atrophy of thyroid  Assessment & Plan:  Recent TSH normal per endocrinology. Continue current dose of Synthroid. Recommendations for Preventive Services Due: see orders and patient instructions/AVS.  Recommended screening schedule for the next 5-10 years is provided to the patient in written form: see Patient Instructions/AVS.    Return in about 6 months (around 11/6/2022) for 30 MIN F/U.    SUBJECTIVE   Subjective Jeremy Miramontes is here for Medicare AWV        Patient's complete Health Risk Assessment and screening values have been reviewed and are found in [facility]. The following risks were reviewed today and where indicated follow up appointments were made and/or referrals ordered.     Positive Risk Factor Screenings with Interventions:    Fall Risk:  Do you feel unsteady or are you worried about falling? : (!) yes (Due to Parkinson's)  2 or more falls in past year?: no  Fall with injury in past year?: no     Fall Risk Interventions:    · Continue Parkinson's [place] [id number] [employer] classes              General Health and ACP:  General  In general, how would you say your health is?: Good (Good considering Parkinson's)  In the past 7 days, have you experienced any of the following: New or Increased Pain, New or Increased Fatigue, Loneliness, Social Isolation, Stress or Anger?:  (Shoulder, neck pain shooting 9 of 10 on scale. X 6 months. Is feeling tired, up multiple times during the night to urinate, has stress/anger due health situations both his and family's.)  Do you get the social and emotional support that you need?: Yes  Do you have a Living Will?: Yes    Advance Directives     Power of [address] [place] [address] Will ACP-Advance Directive ACP-Power of     Not on File Not on File Not on File Not on File      General Health Risk Interventions:  · Pain issues: tightness and pain in right neck area, physical therapy referral ordered  · Stress: Due to daughter's health issues. Has adequate resources currently. OBJECTIVE     Wt Readings from Last 3 Encounters:   05/06/22 184 lb (83.5 kg)   01/14/22 181 lb (82.1 kg)   11/12/21 180 lb (81.6 kg)     BP Readings from Last 3 Encounters:   05/06/22 122/82   01/14/22 132/78   11/17/21 (!) 179/70     Body mass index is 27.98 kg/m². Physical Exam  Constitutional:       General: He is not in acute distress. Appearance: He is well-developed. Eyes:      Conjunctiva/sclera: Conjunctivae normal.   Neck:      Thyroid: No thyroid mass or thyromegaly. Vascular: No carotid bruit. Cardiovascular:      Rate and Rhythm: Normal rate and regular rhythm. Heart sounds: Normal heart sounds. No murmur heard. No friction rub. No gallop. Pulmonary:      Effort: Pulmonary effort is normal. No respiratory distress. Breath sounds: Normal breath sounds. No wheezing or rales. Abdominal:      General: Bowel sounds are normal. There is no distension. Palpations: Abdomen is soft. Tenderness: There is no abdominal tenderness. Musculoskeletal:      Cervical back: Muscular tenderness (right trazpezius spasm) present. No spinous process tenderness. Decreased range of motion. Right lower leg: No edema. Left lower leg: No edema. Skin:     General: Skin is warm and dry. Findings: No erythema or rash. Neurological:      Mental Status: He is alert and oriented to person, place, and time. Cranial Nerves: No cranial nerve deficit. Sensory: No sensory deficit. Coordination: Coordination normal.      Comments: Coarse resting tremor left hand. Intermittent dyskinesias. Psychiatric:         Behavior: Behavior normal.         Thought Content: Thought content normal.         Judgment: Judgment normal.         No Known Allergies  Prior to Visit Medications    Medication Sig Taking?  Authorizing Provider   NATEGLINIDE PO Take by mouth 3 times daily (before meals) Yes Historical Provider, MD   atorvastatin (LIPITOR) 40 MG tablet TAKE 1 TABLET BY MOUTH EVERY DAY Yes Noni Esquivel MD   levothyroxine (SYNTHROID) 75 MCG tablet TAKE 1 TABLET BY MOUTH EVERY DAY Yes Noni Esquivel MD   Cyanocobalamin (VITAMIN B-12) 1000 MCG SUBL Place 1 tablet under the tongue daily Yes Noni Esquivel MD   pioglitazone (ACTOS) 15 MG tablet Take 15 mg by mouth daily Yes Historical Provider, MD   carbidopa-levodopa (SINEMET)  MG per tablet Take 2.5 tablets by mouth 3 times daily  Yes Historical Provider, MD   aspirin 81 MG EC tablet Take 81 mg by mouth nightly Yes Historical Provider, MD   metFORMIN (GLUCOPHAGE-XR) 500 MG extended release tablet Take 1,000 mg by mouth 2 times daily  Yes Historical Provider, MD   ondansetron (ZOFRAN ODT) 4 MG disintegrating tablet Take 1 tablet by mouth every 8 hours as needed for Nausea  Patient not taking: Reported on 5/6/2022  Shital Stubbs MD   dicyclomine (BENTYL) 10 MG capsule Take 1 capsule by mouth every 6 hours as needed (cramps)  Patient not taking: Reported on 5/6/2022  Fritz Mancilla MD   levothyroxine (SYNTHROID) 75 MCG tablet TAKE 1 TABLET BY MOUTH EVERY DAY  Gilbert Spear MD       [facility] (Including outside providers/suppliers regularly involved in providing care):   Patient Care Team:  Gilbert Spear MD as PCP - Gianfranco Petersen MD as PCP - [facility] Empaneled Provider  Gianni Jean MD as Consulting Physician (Urology)  Darcy Vargas (Internal Medicine)  Caio Faith MD as Consulting Physician (Endocrinology)  Gudelia Camp MD as Consulting Physician (Urology)    Current Health Maintenance Status  Health Maintenance   Topic Date Due    Lipids  04/18/2023    Potassium  04/18/2023    Creatinine  04/18/2023    Prostate Specific Antigen (PSA) Screening or Monitoring  04/25/2023    Depression Screen  05/06/2023    Annual Wellness Visit (AWV)  05/07/2023    DTaP/Tdap/Td vaccine (2 - Td or Tdap) 05/23/2028    Pneumococcal 65+ years Vaccine  Completed    COVID-19 Vaccine  Completed    Hepatitis A vaccine  Aged Out    Hib vaccine  Aged Out    Meningococcal (ACWY) vaccine  Aged Out    Hepatitis C screen  Discontinued     Immunization History   Administered Date(s) Administered    COVID-19, Tiney File, Primary or Immunocompromised, PF, 100mcg/0.5mL 01/03/2021, 01/31/2021, 11/02/2021    Pneumococcal Conjugate 13-valent ([id number]) 04/06/2018    Pneumococcal Polysaccharide ([id number]) 07/09/2021    Td, unspecified formulation 10/27/2003    Tdap (Boostrix, Adacel) 05/23/2018           Reviewed and updated this visit:  Tobacco  Allergies  Meds  Problems  Med Hx  Surg Hx  Soc Hx  Fam Hx

## 2022-05-06 NOTE — PATIENT INSTRUCTIONS
Personalized Preventive Plan for Yoel Marquis - 5/6/2022  Medicare offers a range of preventive health benefits. Some of the tests and screenings are paid in full while other may be subject to a deductible, co-insurance, and/or copay. Some of these benefits include a comprehensive review of your medical history including lifestyle, illnesses that may run in your family, and various assessments and screenings as appropriate. After reviewing your medical record and screening and assessments performed today your provider may have ordered immunizations, labs, imaging, and/or referrals for you. A list of these orders (if applicable) as well as your Preventive Care list are included within your After Visit Summary for your review. Other Preventive Recommendations:    · A preventive eye exam performed by an eye specialist is recommended every 1-2 years to screen for glaucoma; cataracts, macular degeneration, and other eye disorders. · A preventive dental visit is recommended every 6 months. · Try to get at least 150 minutes of exercise per week or 10,000 steps per day on a pedometer . · Order or download the FREE \"Exercise & Physical Activity: Your Everyday Guide\" from [employer]. Call 9-519.726.6532 or search [employer] online. · You need 3951-8192 mg of calcium and 3694-4392 IU of vitamin D per day. It is possible to meet your calcium requirement with diet alone, but a vitamin D supplement is usually necessary to meet this goal.  · When exposed to the sun, use a sunscreen that protects against both UVA and UVB radiation with an SPF of 30 or greater. Reapply every 2 to 3 hours or after sweating, drying off with a towel, or swimming. · Always wear a seat belt when traveling in a car. Always wear a helmet when riding a bicycle or motorcycle.

## 2022-05-06 NOTE — TELEPHONE ENCOUNTER
Patient calling to provide the following PSA results:    3.58    Patient can be reached @ phone # provided should there be any questions.

## 2022-05-06 NOTE — ASSESSMENT & PLAN NOTE
Clinical picture most consistent with trapezius spasm. Will avoid muscle relaxants due increased falls risk. If no significant improvement with PT, will refer for therapeutic massage. Patient will call if symptoms change or worsen.

## 2022-05-12 ENCOUNTER — HOSPITAL ENCOUNTER (OUTPATIENT)
Dept: PHYSICAL THERAPY | Age: 77
Setting detail: THERAPIES SERIES
Discharge: HOME | End: 2022-05-12
Payer: MEDICARE

## 2022-05-12 PROCEDURE — 97110 THERAPEUTIC EXERCISES: CPT

## 2022-05-12 PROCEDURE — 97140 MANUAL THERAPY 1/> REGIONS: CPT

## 2022-05-12 PROCEDURE — 97163 PT EVAL HIGH COMPLEX 45 MIN: CPT

## 2022-05-12 NOTE — PLAN OF CARE
[facility] - [facility]  [address] [facility], [place] [address]  Phone: [phone]   Fax:     [phone]          Emma Camilo    Dear Dr. Nathan Lucio  ,    We had the pleasure of evaluating the following patient for physical therapy services at [facility]. A summary of our findings can be found in the initial assessment below. This includes our plan of care. If you have any questions or concerns regarding these findings, please do not hesitate to contact me at the office phone number checked above. Thank you for the referral.       Physician Signature:_______________________________Date:__________________  By signing above (or electronic signature), therapists plan is approved by physician            Patient: Karla Meeks   : 1945   MRN: [id number]  Referring Physician:  Dr. Nathan Lucio      Evaluation Date: 2022      Medical and Treatment Diagnosis Information:    M54.2 (ICD-10-CM) - Neck pain on right side                                            Insurance information:  [employer] $40 copay    Precautions/ Contra-indications:  Latex Allergy:  [x]NO      []YES  Preferred Language for Healthcare:   [x]English       []other:    C-SSRS Triggered by Intake questionnaire (Past 2 wk assessment ):   [x] No, Questionnaire did not trigger screening.   [] Yes, Patient intake triggered C-SSRS Screening      [] C-SSRS Screening completed  [] PCP notified via Epic      SUBJECTIVE: Patient stated complaint:  Neck pain and spasms. Onset approx 2 months ago, insideously. Saw PCP 2022 due to fall risk did not prescribe any meds for spasms and was dx with trapezius spasms, to the right side of neck. Relevant Medical History:  recent change in PD meds reduced from 12 to 11 four times daily which happened during onset as well.  Complicated by RTC repairs abbie with residual weakness and intermittent pain. Functional Outcomes Measure: FOTO  = score 53  Pain Scale: 5/10 current, worse 10/10   Easing factors: \"nothing\"   Provocative factors: turning head pushing pulling lifting Boxing class for PD- which is modified now to improve maxine for this class, participates once a week, golfting twice a week  Type: [x]Constant   []Intermittent  []Radiating []Localized []other:  Numbness/Tingling: Denies   Occupation/School: Retired fire  Living Status/Prior Level of Function: Independent with ADLs and IADLs,     OBJECTIVE:   Posture: RUE dominant. Pt has significant resting tremors mod to severely ( L upper quadrant worse than the Right) forward head, rounded shoulders, ant tipped abduction downwardly rotated abbie scaps, left winged scap >right, slumped sitting posture, requires constant cues for postural corrections for upright posturing. Functional Mobility/Transfers: indep    Palpation: UT rhombs, pecs, biceps infra/supraspinatus right upper. Gait: (include devices/WB status):  WNL     CERV ROM  COMMENTS: WFL TO WNL EXCEPTION BELOW ARE MARKED   Cervical Flexion AROM   Seated + pain at end range   35 deg tight end feel    Cervical Extension AROM  Seated + pain throughout  9 deg tight end feel Normal 45    Left Right   Cervical SB 15 15   Cervical rotation 35 40   Quadrant     Dorsal Glide      UE AROM Left Right   Shoulder Flex     Shoulder Abd     Shoulder ER  abbie WFL with effort due to tightness   Shoulder IR  RUE to L2 LUE T9   Elbow flex/ext     Wrist flex/ext/pro/sup     Finger flex/ext/opposition     Shoulder AROM WNL w OP     UE Strength  Left Right   Shoulder Flex Abbie 4-/5    Shoulder Scap     Shoulder ABd (C5 Axillary)     Shoulder ER  abbie 4-/5    Shoulder IR Abbie 4-/5    Elbow Flex (C5 Musc)     Elbow Ext (C7 Radial) abbie 4-/5     Wrist Flex (C6 Radial)  Wrist below not tested   Wrist Ext (C7 Radial)     Finger flex (C8 median)     Finger ext (C7 Radial-PIN) APB (T1 Median)     Finger Abd (T1 Ulnar)     UE myotomes WNL        Reflexes Normal Abnormal Comments   Not tested             S1-2 Seated achilles [] []    S1-2 Prone knee bend [] []    L3-4 Patellar tendon [] []    C5-6 Biceps [] []    C6 Brachioradialis [] []    C7-8 Triceps [] []      Reflexes/Sensation:    []Dermatomes/Myotomes intact    []Reflexes equal and normal bilaterally   []Other: complicated and not tested due to hx of PD     Joint mobility:    []Normal    [x]Hypo c spine and thoracic spine []Hyper                         [x] Patient history, allergies, meds reviewed. Medical chart reviewed. See intake form. Review Of Systems (ROS):  [x]Performed Review of systems (Integumentary, CardioPulmonary, Neurological) by intake and observation. Intake form has been scanned into medical record. Patient has been instructed to contact their primary care physician regarding ROS issues if not already being addressed at this time.       Co-morbidities/Complexities (which will affect course of rehabilitation):   []None           Arthritic conditions   []Rheumatoid arthritis (M05.9)  [x]Osteoarthritis (M19.91)   Cardiovascular conditions   [x]Hypertension (I10)  [x]Hyperlipidemia (E78.5)  []Angina pectoris (I20)  []Atherosclerosis (I70)  []CVA Musculoskeletal conditions   []Disc pathology   []Congenital spine pathologies   []Prior surgical intervention  []Osteoporosis (M81.8)  []Osteopenia (M85.8)   Endocrine conditions   [x]Hypothyroid (E03.9)  []Hyperthyroid Gastrointestinal conditions   []Constipation (R67.53)   Metabolic conditions   []Morbid obesity (E66.01)  [x]Diabetes type 1(E10.65) or 2 (E11.65)   []Neuropathy (G60.9)     Pulmonary conditions   []Asthma (J45)  []Coughing   []COPD (J44.9)   Psychological Disorders  []Anxiety (F41.9)  []Depression (F32.9)   []Other:   [x]Other:   67 y/o, CVA 2016, CAD, Prostate CA 4/2013, PD neuro MD at [facility]     Barriers to/and or personal factors that will affect rehab potential:              [x]Age  []Sex   []Smoker              []Motivation/Lack of Motivation                        [x]Co-Morbidities              [x]Cognitive Function, education/learning barriers              []Environmental, home barriers              []profession/work barriers  []past PT/medical experience  []other:  Justification:  Complicated by evolving changes in PD and medication management changes, hx of CVA, DM, Prostatic CA, pt has had no prior to PT for this condition. Falls Risk Assessment (30 days):   [x] Falls Risk assessed and no intervention required. [] Falls Risk assessed and Patient requires intervention due to being higher risk   TUG score (>12s at risk):     [] Falls education provided, including     ASSESSMENT: 67 y/o male who presents with spasmatic UTs, most likely related to PD and changes in medications as of recent. Pt has significant nonfunctional c spine AROM and PROM, c spine thoracic spine are hypomoble also impacting posture and functional mobility, chuck hx of RTC repairs possible replacements with limitations with ROM and strength in the RUE. C/c is right sided UT levator and neck pain. Onset about 2 months ago, isnt getting better, warm showers help, impacts his maxine for any activities which require turning head or funcitonal reach/lifting pushing pulling carrying or force through the UEs which dec his participation in golf and boxing for PD which he attends multiple times a week. Pt responded well with reduced complaints following HEP for strength and manual therapy for improving ROM/flexiblity and joint mobility in upper quadrant and verbalized reduction of pain and tightness 2/10 from 5/10. Per FOTO neck pt has 47% impairment/disability.  Pt should met therapy goals with continued participation in PT which will focus on manual therapy to reduce pain, improve flexibility and mobility of upper quadrant, and assist stengthening Therapeutic exs/NMR to improve strength, postural deviations, modalities as needed and appropriate. Pt condition is complicated due to age, co-morbities, pt is on blood thinner which we will have to limit deeper tissue work, plus with hx of PD stretching and manual therapy may be challenging due to tremors. Functional Impairments:     [x]Noted cervical/thoracic/GHJ joint hypomobility   []Noted cervical/thoracic/GHJ joint hypermobility   [x]Decreased cervical/UE functional ROM   []Noted Headache pain aggravated by neck movements with/without dizziness   []Abnormal reflexes/sensation/myotomal/dermatomal deficits   [x]Decreased DCF control or ability to hold head up   [x]Decreased RC/scapular/core strength and neuromuscular control    [x]Decreased UE functional strength   []other:      Functional Activity Limitations (from functional questionnaire and intake)   [x]Reduced ability to tolerate prolonged functional positions   [x]Reduced ability or difficulty with changes of positions or transfers between positions   [x]Reduced ability to maintain good posture and demonstrate good body mechanics with sitting, bending, and lifting   [x] Reduced ability or tolerance with driving and/or computer work   [x]Reduced ability to perform lifting, reaching, carrying tasks   []Reduced ability to concentrate   []Reduced ability to sleep    [x]Reduced ability to tolerate any impact through UE or spine   []Reduced ability to ambulate prolonged functional periods/distances   []other:    Participation Restrictions   [x]Reduced participation in self care activities   [x]Reduced participation in home management activities   []Reduced participation in work activities   [x]Reduced participation in social activities. [x]Reduced participation in sport/recreational activities.     Classification/Subgrouping:   [x]signs/symptoms consistent with neck pain with mobility deficits     [x]signs/symptoms consistent with neck pain with movement coordinated impairments [x]signs/symptoms consistent with neck pain with radiating pain    []signs/symptoms consistent with neck pain with headaches (cervicogenic)    []Signs/symptoms consistent with nerve root involvement including myotome & dermatome dysfunction   [x]sign/symptoms consistent with facet dysfunction of cervical and thoracic spine    []signs/symptoms consistent suggesting central cord compression/UMN syndromes   []signs/symptoms consistent with discogenic cervical pain   []signs/symptoms consistent with rib dysfunction   [x]signs/symptoms consistent with postural dysfunction   [x]signs/symptoms consistent with shoulder pathology    []signs/symptoms consistent with post-surgical status including decreased ROM, strength and function.    []signs/symptoms consistent with pathology which may benefit from Dry Needling   [x]signs/symptoms which may limit the use of advanced manual therapy techniques: (Elevated CV risk profile, recent trauma, intolerance to end range positions, prior TIA, visual issues, UE neurological compromise )     Prognosis/Rehab Potential:      []Excellent   [x]Good    []Fair   []Poor    Tolerance of evaluation/treatment:    []Excellent   [x]Good    []Fair   []Poor    Physical Therapy Evaluation Complexity Justification  [x] A history of present problem with:  [] no personal factors and/or comorbidities that impact the plan of care;  []1-2 personal factors and/or comorbidities that impact the plan of care  [x]3 personal factors and/or comorbidities that impact the plan of care  [x] An examination of body systems using standardized tests and measures addressing any of the following: body structures and functions (impairments), activity limitations, and/or participation restrictions;:  [] a total of 1-2 or more elements   [x] a total of 3 or more elements   [] a total of 4 or more elements   [x] A clinical presentation with:  [] stable and/or uncomplicated characteristics   [] evolving clinical presentation with changing characteristics  [x] unstable and unpredictable characteristics;   [x] Clinical decision making of [] low, [] moderate, [x] high complexity using standardized patient assessment instrument and/or measurable assessment of functional outcome. [] EVAL (LOW) 96756 (typically 20 minutes face-to-face)  [] EVAL (MOD) 56487 (typically 30 minutes face-to-face)  [x] EVAL (HIGH) 15968 (typically 45 minutes face-to-face)  [] RE-EVAL     PLAN:   Frequency/Duration:  2 days per week for 5 Weeks:  Interventions:  [x]  Therapeutic exercise including: strength training, ROM, for cervical spine,scapula, core and Upper extremity, including postural re-education. [x]  NMR activation and proprioception for Deep cervical flexors, periscapular and RC muscles and Core, including postural re-education. [x]  Manual therapy as indicated for C/T spine, ribs, Soft tissue to include: Dry Needling/IASTM, STM, PROM, Gr I-IV mobilizations, manipulation. [x] Modalities as needed that may include: thermal agents, E-stim, Biofeedback, US, iontophoresis as indicated  [x] Patient education on joint protection, postural re-education, activity modification, progression of HEP. HEP instruction: denies heat intolerance or contraindications. Access Code: [email or web address]  URL: [email or web address]  Date: 05/12/2022  Prepared by: Ashtyn Lucero  Exercises  Cervical Extension Stretch - 2 x daily - 7 x weekly - 3 reps - 15-30s hold  Neck Rotation Stretch - 2 x daily - 7 x weekly - 3 reps - 15-30s hold  Neck Rotation Stretch - 2 x daily - 7 x weekly - 3 reps - 15-30s hold  Neck Sidebend Stretch - 2 x daily - 7 x weekly - 3 reps - 15-30s hold  Standing Lower Cervical and Upper Thoracic Stretch - 2 x daily - 7 x weekly - 3 reps - 15-30s hold  Patient Education  Heat    GOALS:  Patient stated goal: reduce your pain, improve range of motion in upper quadrant for ease of driving and any activities with head turns  [] Progressing: [] Met: [] Not Met: [] Adjusted    Therapist goals for Patient:   Short Term Goals: To be achieved in: 2 weeks  1. Independent in HEP and progression per patient tolerance, in order to prevent re-injury. [] Progressing: [] Met: [] Not Met: [] Adjusted  2. Patient will have a decrease in pain to resting pain 0/10, worse pain from 10/10 to less than/=4/10 facilitate improvement in movement, function, and ADLs as indicated by Functional Deficits. [] Progressing: [] Met: [] Not Met: [] Adjusted    Long Term Goals: To be achieved in: 5 weeks  1. Increase FOTO functional outcome score from 53 to 65  to assist with reaching prior level of function. [] Progressing: [] Met: [] Not Met: [] Adjusted  2. Patient will demonstrate increased AROM without inc complaints by exhibiting  c spine AROM of SB chuck >/=20 deg ( from 15 deg at Eval), Rotation chuck to >/=55 deg ( from left/right 35/40deg at Eval), ext to >/=25 deg ( from 9deg at eval), flexion to >/=45 ( from 35 at eval)   of cervical/thoracic spine to allow for proper joint functioning as indicated by patients Functional Deficits. [] Progressing: [] Met: [] Not Met: [] Adjusted  3. UEs MMT 5/5 ( inc 1 muscle grade from eval) for ease of reaching/pushing/pulling carrying/lifting and regaining maxine for golf and Boxing PD classes  [] Progressing: [] Met: [] Not Met: [] Adjusted   Electronically signed by:  Zayda Sierra PT DPT      Note: If patient does not return for scheduled/recommended follow up visits, this note will serve as a discharge from care along with the most recent update on progress.

## 2022-05-12 NOTE — FLOWSHEET NOTE
[facility]. [address]  Phone: [phone]   Fax:     [phone]    Physical Therapy Treatment Note/ Progress Report:     Date:  2022    Patient Name:  Butch Curry    :  1945  MRN: [id number]    Medical and Treatment Diagnosis Information:  · Diagnosis: 54.2 Neck pain on right side  ·      Insurance/Certification information:   [facility]  Physician Information:   Gilbert Spear MD ( PCP), neuro at [facility] [address] Day Pace of care signed (Y/N): N    Date of Patient follow up with Physician: 2022     Progress Report: []  Yes  [x]  No     Date Range for reporting period:  Beginnin2022  Ending:     Progress report due (10 Rx/or 30 days whichever is less): 6785     Recertification due (POC duration/ or 90 days whichever is less):      Visit # Insurance/POC Allowable Auth Needed   1/10 40 dollar copay []Yes    [x]No     Functional Outcomes Measure:    Date Assessed: at eval  Test: FOTO  Score:    Pain level:  5/10 right sided c spine UT, 2/10 post      HISTORY OF INJURY:  Relevant Medical History:  recent change in PD meds reduced from 12 to 11 four times daily which happened during onset as well. Complicated by RTC repairs chuck with residual weakness and intermittent pain. SUBJECTIVE:  See evalPatient stated complaint:  Neck pain and spasms. Onset approx 2 months ago, insideously. Saw PCP 2022 due to fall risk did not prescribe any meds for spasms and was dx with trapezius spasms, to the right side of neck. OBJECTIVE:    Observation:    Test measurements:      RESTRICTIONS/PRECAUTIONS: PD, CVA hx, Prostate CA hx, DM, OA, 67 y/o, CAD on blood thinners recent changes in PD meds.      Exercises/Interventions:   Therapeutic Ex (39795)  Min: Resistance/Repetitions Notes   c spine stretching per HEP below    SB stretch chuck    Rotation stretch chuck Flexion/UT stretch       SCM c spine ext with OP stretching     SB shld IR stretch with op      30s x 3 chuck ea Included education over posture, faulty vs normal and to self correct for dec strainon spine how faulty postures can reduce ROM in c spine thoracic spine and UEs, normal ranges vs ranges in c spine UEs to improve awareness to improvement of stretching and self correcting postures, heat vs ice, parameters indication for both, rec heat pt verbalizign no questions, required postural and form cues during c spine stretching, PT demo and improve returnof demo by pt, also provided tactical cues and education over which muscles were being stretched during ex                                      Manual Intervention ([phone])  Min:     Cerv mobs/manip Grade 1-3 to dec pain and improve  C3-T4 supine   Thoracic mobs/manip     CT manip     Rib mobilizations     STM Sob, sor 10s x 10 reps x2  SB SCM levators scalene and pecs, chuck   Limited to light to mod pressure  precuation on blood thinner  Knotting uses, brushing uses        NMR re-education (40305)  Min:               Therapeutic Activity (04847)  Min:               Modalities  Min:                  Other Therapeutic Activities:  Pt was educated on PT POC, Diagnosis, Prognosis, pathomechanics as well as frequency and duration of scheduling future physical therapy appointments. Time was also taken on this day to answer all patient questions and participation in PT. Reviewed appointment policy in detail with patient and patient verbalized understanding.      Home Exercise Program:    Therapeutic Exercise and NMR EXR  [x] (61641) Provided verbal/tactile cueing for activities related to strengthening, flexibility, endurance, ROM  for improvements in cervical, postural, scapular, scapulothoracic and UE control with self care, reaching, carrying, lifting, house/yardwork, driving/computer work.    [] (55763) Provided verbal/tactile cueing for activities related to improving balance, coordination, kinesthetic sense, posture, motor skill, proprioception  to assist with cervical, scapular, scapulothoracic and UE control with self care, reaching, carrying, lifting, house/yardwork, driving/computer work. Therapeutic Activities:    [] (60722 or 67402) Provided verbal/tactile cueing for activities related to improving balance, coordination, kinesthetic sense, posture, motor skill, proprioception and motor activation to allow for proper function of cervical, scapular, scapulothoracic and UE control with self care, carrying, lifting, driving/computer work.      Home Exercise Program:    [] (15837) Reviewed/Progressed HEP activities related to strengthening, flexibility, endurance, ROM of cervical, scapular, scapulothoracic and UE control with self care, reaching, carrying, lifting, house/yardwork, driving/computer work  [] (99518) Reviewed/Progressed HEP activities related to improving balance, coordination, kinesthetic sense, posture, motor skill, proprioception of cervical, scapular, scapulothoracic and UE control with self care, reaching, carrying, lifting, house/yardwork, driving/computer work      Manual Treatments:  PROM / STM / Oscillations-Mobs:  G-I, II, III, IV (PA's, Inf., Post.)  [x] (93144) Provided manual therapy to mobilize soft tissue/joints of cervical/CT, scapular GHJ and UE for the purpose of decreasing headache, modulating pain, promoting relaxation,  increasing ROM, reducing/eliminating soft tissue swelling/inflammation/restriction, improving soft tissue extensibility and allowing for proper ROM for normal function with self care, reaching, carrying, lifting, house/yardwork, driving/computer work      Approval Dates:  CPT Code Units Approved Units Used  Date Updated:                     Charges:  Timed Code Treatment Minutes: High complexity evaluation, 23 mins TE, 15 mins    Total Treatment Minutes: 53     [] EVAL (LOW) 63966 (typically 20 minutes face-to-face)  [] EVAL (MOD) 68532 (typically 30 minutes face-to-face)  [x] EVAL (HIGH) 91860 (typically 45 minutes face-to-face)  [] RE-EVAL     [x] (47512) x   2  [] Dry needle 1 or 2 Muscles (44001)  [] NMR (84235) x     [] Dry needle 3+ Muscles (86130)  [x] Manual (87714) x   1  [] Ultrasound (99894) x  [] TA (53745) x     [] Mech Traction (73913)  [] ES(attended) (20361)     [] ES (un) (09751):   [] Vasopump (77470) [] Ionto (51658)   [] Other:    GOALS:  Patient stated goal: reduce your pain, improve range of motion in upper quadrant for ease of driving and any activities with head turns  [] Progressing: [] Met: [] Not Met: [] Adjusted    Therapist goals for Patient:   Short Term Goals: To be achieved in: 2 weeks  1. Independent in HEP and progression per patient tolerance, in order to prevent re-injury. [] Progressing: [] Met: [] Not Met: [] Adjusted  2. Patient will have a decrease in pain to resting pain 0/10, worse pain from 10/10 to less than/=4/10 facilitate improvement in movement, function, and ADLs as indicated by Functional Deficits. [] Progressing: [] Met: [] Not Met: [] Adjusted    Long Term Goals: To be achieved in: 5 weeks  1. Increase FOTO functional outcome score from 53 to 65  to assist with reaching prior level of function. [] Progressing: [] Met: [] Not Met: [] Adjusted  2. Patient will demonstrate increased AROM without inc complaints by exhibiting  c spine AROM of SB chuck >/=20 deg ( from 15 deg at Eval), Rotation chuck to >/=55 deg ( from left/right 35/40deg at Eval), ext to >/=25 deg ( from 9deg at eval), flexion to >/=45 ( from 35 at eval)   of cervical/thoracic spine to allow for proper joint functioning as indicated by patients Functional Deficits.    [] Progressing: [] Met: [] Not Met: [] Adjusted  3. UEs MMT 5/5 ( inc 1 muscle grade from eval) for ease of reaching/pushing/pulling carrying/lifting and regaining maxine for golf and Boxing PD classes  [] Progressing: [] Met: [] Not Met: [] Adjusted    ASSESSMENT:  See eval    Treatment/Activity Tolerance:  [x] Patient tolerated treatment well [] Patient limited by fatique  [] Patient limited by pain  [] Patient limited by other medical complications  [] Other:     Overall Progression Towards Functional goals/ Treatment Progress Update:  [] Patient is progressing as expected towards functional goals listed. [] Progression is slowed due to complexities/Impairments listed. [] Progression has been slowed due to co-morbidities. [x] Plan just implemented, too soon to assess goals progression <30days   [] Goals require adjustment due to lack of progress  [] Patient is not progressing as expected and requires additional follow up with physician  [] Other    Prognosis for POC: [x] Good [] Fair  [] Poor    Patient requires continued skilled intervention: [x] Yes  [] No        PLAN: See eval  [x] Continue per plan of care [] Alter current plan (see comments)  [x] Plan of care initiated [] Hold pending MD visit [] Discharge    Electronically signed by: Rachana Langston PT DPT    Note: If patient does not return for scheduled/recommended follow up visits, this note will serve as a discharge from care along with the most recent update on progress.

## 2022-05-16 ENCOUNTER — HOSPITAL ENCOUNTER (OUTPATIENT)
Dept: PHYSICAL THERAPY | Age: 77
Setting detail: THERAPIES SERIES
Discharge: HOME | End: 2022-05-16
Payer: MEDICARE

## 2022-05-16 PROCEDURE — 97140 MANUAL THERAPY 1/> REGIONS: CPT

## 2022-05-16 PROCEDURE — 97110 THERAPEUTIC EXERCISES: CPT

## 2022-05-16 NOTE — FLOWSHEET NOTE
[facility] [place]. [address]  Phone: [phone]   Fax:     [phone]    Physical Therapy Treatment Note/ Progress Report:     Date:  2022    Patient Name:  Lois Villegas    :  1945  MRN: [id number]    Medical and Treatment Diagnosis Information:  ·   Neck pain on right side  Insurance/Certification information:   [employer] [facility]  Physician Information:   Jane Parekh MD ( PCP), neuro at [facility] Dr. Paola Temple of care signed (Y/N): N    Date of Patient follow up with Physician: 2022     Progress Report: []  Yes  []  No     Date Range for reporting period:  Beginnin2022  Ending:     Progress report due (10 Rx/or 30 days whichever is less):      Recertification due (POC duration/ or 90 days whichever is less):      Visit # Insurance/POC Allowable Auth Needed   2/10 40 dollar copay []Yes    [x]No     Functional Outcomes Measure:    Date Assessed: at eval  Test: FOTO  Score:    Pain level:  3/10 right sided c spine UT, 1-2/10 chuck suboccipitals      HISTORY OF INJURY:  Relevant Medical History:  recent change in PD meds reduced from 12 to 11 four times daily which happened during onset as well. Complicated by RTC repairs chuck with residual weakness and intermittent pain. SUBJECTIVE:  Feels better since evaluation, but still having complaints more in the suboccipitals today. \"you really help loose my neck it up. It feels better\"       OBJECTIVE:    Observation:    Test measurements:  Improving ease of chuck functional ER reaching to back of skull, flexion with ease post session. RESTRICTIONS/PRECAUTIONS: PD, CVA hx, Prostate CA hx, DM, OA, 69 y/o, CAD on blood thinners recent changes in PD meds.      Exercises/Interventions:   Therapeutic Ex (62853)  Min: Resistance/Repetitions Notes   c spine stretching per HEP below    SB stretch chuck    Rotation stretch chuck Flexion/UT stretch       SCM c spine ext with OP stretching     SB shld IR stretch with op      30s x 3 abbie ea Included education over posture, faulty vs normal and to self correct for dec strainon spine how faulty postures can reduce ROM in c spine thoracic spine and UEs, normal ranges vs ranges in c spine UEs to improve awareness to improvement of stretching and self correcting postures, heat vs ice, parameters indication for both, rec heat pt verbalizign no questions, required postural and form cues during c spine stretching, PT demo and improve returnof demo by pt, also provided tactical cues and education over which muscles were being stretched during ex   Light resistance shoulder diagonals  10s x 10 reps                                   Manual Intervention (27959)  Min:     Cerv mobs/manip Grade 1-3 to dec pain and improve  C3-T4 supine   Thoracic mobs/manip     CT manip     Rib mobilizations     STM Sob, sor 10s x 10 reps x2  SB SCM levators scalene and pecs, abbie   Limited to light to mod pressure  precuation on blood thinner  Knotting uses, brushing uses   GH AC scap STM and mobs  Abbie UE grade 1-4   UE abbie distraction with oscillations STM to lateral scap borders abbie     NMR re-education (14431)  Min:               Therapeutic Activity (54550)  Min:               Modalities  Min:                  Other Therapeutic Activities:  Pt was educated on PT POC, Diagnosis, Prognosis, pathomechanics as well as frequency and duration of scheduling future physical therapy appointments. Time was also taken on this day to answer all patient questions and participation in PT. Reviewed appointment policy in detail with patient and patient verbalized understanding.      Home Exercise Program:    Therapeutic Exercise and NMR EXR  [x] (04626) Provided verbal/tactile cueing for activities related to strengthening, flexibility, endurance, ROM  for improvements in cervical, postural, scapular, scapulothoracic and UE control with self care, reaching, carrying, lifting, house/yardwork, driving/computer work.    [] (15323) Provided verbal/tactile cueing for activities related to improving balance, coordination, kinesthetic sense, posture, motor skill, proprioception  to assist with cervical, scapular, scapulothoracic and UE control with self care, reaching, carrying, lifting, house/yardwork, driving/computer work. Therapeutic Activities:    [] (50023 or 66163) Provided verbal/tactile cueing for activities related to improving balance, coordination, kinesthetic sense, posture, motor skill, proprioception and motor activation to allow for proper function of cervical, scapular, scapulothoracic and UE control with self care, carrying, lifting, driving/computer work.      Home Exercise Program:    [] (25918) Reviewed/Progressed HEP activities related to strengthening, flexibility, endurance, ROM of cervical, scapular, scapulothoracic and UE control with self care, reaching, carrying, lifting, house/yardwork, driving/computer work  [] (26685) Reviewed/Progressed HEP activities related to improving balance, coordination, kinesthetic sense, posture, motor skill, proprioception of cervical, scapular, scapulothoracic and UE control with self care, reaching, carrying, lifting, house/yardwork, driving/computer work      Manual Treatments:  PROM / STM / Oscillations-Mobs:  G-I, II, III, IV (PA's, Inf., Post.)  [x] (52345) Provided manual therapy to mobilize soft tissue/joints of cervical/CT, scapular GHJ and UE for the purpose of decreasing headache, modulating pain, promoting relaxation,  increasing ROM, reducing/eliminating soft tissue swelling/inflammation/restriction, improving soft tissue extensibility and allowing for proper ROM for normal function with self care, reaching, carrying, lifting, house/yardwork, driving/computer work      Approval Dates:  CPT Code Units Approved Units Used  Date Updated:                     Charges:  Timed Code Treatment Minutes: 25 mins Manual 15 mins TE   Total Treatment Minutes: 40     [] EVAL (LOW) 60842 (typically 20 minutes face-to-face)  [] EVAL (MOD) 37848 (typically 30 minutes face-to-face)  [x] EVAL (HIGH) 49577 (typically 45 minutes face-to-face)  [] RE-EVAL     [x] WW(98344) x   1  [] Dry needle 1 or 2 Muscles (18418)  [] NMR (76986) x     [] Dry needle 3+ Muscles (74728)  [x] Manual (67197) x  2 [] Ultrasound (41773) x  [] TA (97185) x     [] Mech Traction (23361)  [] ES(attended) (81431)     [] ES (un) (28517):   [] Vasopump (40636) [] Ionto (39666)   [] Other:    GOALS:  Patient stated goal: reduce your pain, improve range of motion in upper quadrant for ease of driving and any activities with head turns  [] Progressing: [] Met: [] Not Met: [] Adjusted    Therapist goals for Patient:   Short Term Goals: To be achieved in: 2 weeks  1. Independent in HEP and progression per patient tolerance, in order to prevent re-injury. [] Progressing: [] Met: [] Not Met: [] Adjusted  2. Patient will have a decrease in pain to resting pain 0/10, worse pain from 10/10 to less than/=4/10 facilitate improvement in movement, function, and ADLs as indicated by Functional Deficits. [] Progressing: [] Met: [] Not Met: [] Adjusted    Long Term Goals: To be achieved in: 5 weeks  1. Increase FOTO functional outcome score from 53 to 65  to assist with reaching prior level of function. [] Progressing: [] Met: [] Not Met: [] Adjusted  2. Patient will demonstrate increased AROM without inc complaints by exhibiting  c spine AROM of SB chuck >/=20 deg ( from 15 deg at Eval), Rotation chuck to >/=55 deg ( from left/right 35/40deg at Eval), ext to >/=25 deg ( from 9deg at eval), flexion to >/=45 ( from 35 at eval)   of cervical/thoracic spine to allow for proper joint functioning as indicated by patients Functional Deficits.    [] Progressing: [] Met: [] Not Met: [] Adjusted  3. UEs MMT 5/5 ( inc 1 muscle grade from eval) for ease of reaching/pushing/pulling carrying/lifting and regaining maxine for golf and Boxing PD classes  [] Progressing: [] Met: [] Not Met: [] Adjusted    ASSESSMENT:  Reduced pain overall upon arrival and post with improving functional UE AROM with ease today. Cont to benefit from skilled PT to met therapy and pt goals. cont with POC    Treatment/Activity Tolerance:  [x] Patient tolerated treatment well [] Patient limited by fatique  [] Patient limited by pain  [] Patient limited by other medical complications  [] Other:     Overall Progression Towards Functional goals/ Treatment Progress Update:  [] Patient is progressing as expected towards functional goals listed. [] Progression is slowed due to complexities/Impairments listed. [] Progression has been slowed due to co-morbidities. [x] Plan just implemented, too soon to assess goals progression <30days   [] Goals require adjustment due to lack of progress  [] Patient is not progressing as expected and requires additional follow up with physician  [] Other    Prognosis for POC: [x] Good [] Fair  [] Poor    Patient requires continued skilled intervention: [x] Yes  [] No        PLAN: See eval  [x] Continue per plan of care [] Alter current plan (see comments)  [x] Plan of care initiated [] Hold pending MD visit [] Discharge    Electronically signed by: Jase Hendrickson, PT DPT    Note: If patient does not return for scheduled/recommended follow up visits, this note will serve as a discharge from care along with the most recent update on progress.

## 2022-05-19 ENCOUNTER — HOSPITAL ENCOUNTER (OUTPATIENT)
Dept: PHYSICAL THERAPY | Age: 77
Setting detail: THERAPIES SERIES
Discharge: HOME | End: 2022-05-19
Payer: MEDICARE

## 2022-05-19 PROCEDURE — 97140 MANUAL THERAPY 1/> REGIONS: CPT

## 2022-05-19 PROCEDURE — 97110 THERAPEUTIC EXERCISES: CPT

## 2022-05-19 NOTE — FLOWSHEET NOTE
[facility]. [address]  Phone: [phone]   Fax:     [phone]    Physical Therapy Treatment Note/ Progress Report:     Date:  2022    Patient Name:  Yoel Marquis    :  1945  MRN: [id number]    Medical and Treatment Diagnosis Information:  ·   Neck pain on right side  Insurance/Certification information:   [employer]na [facility]  Physician Information:   Tor Garzon MD ( PCP), neuro at [facility] Dr. Ambika Florence of care signed (Y/N): N    Date of Patient follow up with Physician: 2022     Progress Report: []  Yes  []  No     Date Range for reporting period:  Beginnin2022  Ending:     Progress report due (10 Rx/or 30 days whichever is less):      Recertification due (POC duration/ or 90 days whichever is less):      Visit # Insurance/POC Allowable Auth Needed   3/10 40 dollar copay []Yes    [x]No     Functional Outcomes Measure:    Date Assessed: at eval  Test: FOTO  Score:    Pain level:  Denies pain, reports mild stiffness right UT     HISTORY OF INJURY:  Relevant Medical History:  recent change in PD meds reduced from 12 to 11 four times daily which happened during onset as well. Complicated by RTC repairs chuck with residual weakness and intermittent pain. SUBJECTIVE: no adverse reactions since Monday's session \" I feel better. I believe I have better range of motion in my neck. I can tell my range is better now because I can turn my head to look down the driveway\"        OBJECTIVE:    Observation:    Test measurements:      RESTRICTIONS/PRECAUTIONS: PD, CVA hx, Prostate CA hx, DM, OA, 69 y/o, CAD on blood thinners recent changes in PD meds.      Exercises/Interventions:   Therapeutic Ex (90919)  Min: Resistance/Repetitions Notes   HELD pt denies questions and does in HEPLight resistance shoulder diagonals  10s x 10 reps     pec doorway stretching  30x 5 BLE lunge PNF BUE together patterns with head watching hands for stretching balance and ROM  10 ea     Cervical ext/retraction  10s x 10 reps supine     Chin tucks  10s x 10 reps               Manual Intervention (71471)  Min:     Cerv mobs/manip Grade 1-3 to dec pain and improve  C3-T4 supine   Thoracic mobs/manip     CT manip     Rib mobilizations     STM Sob, sor 10s x 10 reps x2  SB SCM levators scalene and pecs, abbie    PROM for stretching SCM abbie UT abbie    Limited to light to mod pressure  precuation on blood thinner  Knotting uses, brushing uses   GH AC scap STM and mobs  Abbie UE grade 1-4   UE abbie distraction with oscillations STM to lateral scap borders abbie     NMR re-education (40825)  Min:               Therapeutic Activity (86892)  Min:               Modalities  Min:                  Other Therapeutic Activities:  Pt was educated on PT POC, Diagnosis, Prognosis, pathomechanics as well as frequency and duration of scheduling future physical therapy appointments. Time was also taken on this day to answer all patient questions and participation in PT. Reviewed appointment policy in detail with patient and patient verbalized understanding. Dicussed LVST program and rec for this due to PD dx. Home Exercise Program:    Therapeutic Exercise and NMR EXR  [x] (95592) Provided verbal/tactile cueing for activities related to strengthening, flexibility, endurance, ROM  for improvements in cervical, postural, scapular, scapulothoracic and UE control with self care, reaching, carrying, lifting, house/yardwork, driving/computer work.    [] (79528) Provided verbal/tactile cueing for activities related to improving balance, coordination, kinesthetic sense, posture, motor skill, proprioception  to assist with cervical, scapular, scapulothoracic and UE control with self care, reaching, carrying, lifting, house/yardwork, driving/computer work.     Therapeutic Activities:    [] (90089 or 64641) Provided verbal/tactile cueing for activities related to improving balance, coordination, kinesthetic sense, posture, motor skill, proprioception and motor activation to allow for proper function of cervical, scapular, scapulothoracic and UE control with self care, carrying, lifting, driving/computer work.      Home Exercise Program:    [] (85450) Reviewed/Progressed HEP activities related to strengthening, flexibility, endurance, ROM of cervical, scapular, scapulothoracic and UE control with self care, reaching, carrying, lifting, house/yardwork, driving/computer work  [] (20924) Reviewed/Progressed HEP activities related to improving balance, coordination, kinesthetic sense, posture, motor skill, proprioception of cervical, scapular, scapulothoracic and UE control with self care, reaching, carrying, lifting, house/yardwork, driving/computer work      Manual Treatments:  PROM / STM / Oscillations-Mobs:  G-I, II, III, IV (PA's, Inf., Post.)  [x] (03356) Provided manual therapy to mobilize soft tissue/joints of cervical/CT, scapular GHJ and UE for the purpose of decreasing headache, modulating pain, promoting relaxation,  increasing ROM, reducing/eliminating soft tissue swelling/inflammation/restriction, improving soft tissue extensibility and allowing for proper ROM for normal function with self care, reaching, carrying, lifting, house/yardwork, driving/computer work      Charges:  Timed Code Treatment Minutes: 25 mins Manual, 20 mins TE   Total Treatment Minutes: 45     [] EVAL (LOW) 28780 (typically 20 minutes face-to-face)  [] EVAL (MOD) 26572 (typically 30 minutes face-to-face)  [] EVAL (HIGH) 71212 (typically 45 minutes face-to-face)  [] RE-EVAL     [x] CT(38674) x   1  [] Dry needle 1 or 2 Muscles (85219)  [] NMR (17751) x     [] Dry needle 3+ Muscles (34313)  [x] Manual (25244) x  2 [] Ultrasound (33023) x  [] TA (53751) x     [] Mech Traction (23959)  [] ES(attended) (49090)     [] ES (un) (28223):   [] Vasopump (10475) [] Ionto (82020)   [] Other:    GOALS:  Patient stated goal: reduce your pain, improve range of motion in upper quadrant for ease of driving and any activities with head turns  [] Progressing: [] Met: [] Not Met: [] Adjusted    Therapist goals for Patient:   Short Term Goals: To be achieved in: 2 weeks  1. Independent in HEP and progression per patient tolerance, in order to prevent re-injury. [] Progressing: [] Met: [] Not Met: [] Adjusted  2. Patient will have a decrease in pain to resting pain 0/10, worse pain from 10/10 to less than/=4/10 facilitate improvement in movement, function, and ADLs as indicated by Functional Deficits. [] Progressing: [] Met: [] Not Met: [] Adjusted    Long Term Goals: To be achieved in: 5 weeks  1. Increase FOTO functional outcome score from 53 to 65  to assist with reaching prior level of function. [] Progressing: [] Met: [] Not Met: [] Adjusted  2. Patient will demonstrate increased AROM without inc complaints by exhibiting  c spine AROM of SB chuck >/=20 deg ( from 15 deg at Eval), Rotation chuck to >/=55 deg ( from left/right 35/40deg at Eval), ext to >/=25 deg ( from 9deg at eval), flexion to >/=45 ( from 35 at eval)   of cervical/thoracic spine to allow for proper joint functioning as indicated by patients Functional Deficits. [] Progressing: [] Met: [] Not Met: [] Adjusted  3. UEs MMT 5/5 ( inc 1 muscle grade from eval) for ease of reaching/pushing/pulling carrying/lifting and regaining maxine for golf and Boxing PD classes  [] Progressing: [] Met: [] Not Met: [] Adjusted    ASSESSMENT:  Reduced pain and stiffness complaints again this session. Add more diagonal movements incorporating further spine and UE stretching and balance activities. Pt reports feeling better and feels that next week could D/C. Will tentatively D/C next week with therapy and pt goals being met. Cont to benefit from skilled PT to met therapy and pt goals.  Cont with POC    Treatment/Activity Tolerance:  [x]

## 2022-05-23 ENCOUNTER — HOSPITAL ENCOUNTER (OUTPATIENT)
Dept: PHYSICAL THERAPY | Age: 77
Setting detail: THERAPIES SERIES
Discharge: HOME | End: 2022-05-23
Payer: MEDICARE

## 2022-05-23 PROCEDURE — 97110 THERAPEUTIC EXERCISES: CPT

## 2022-05-23 PROCEDURE — 97140 MANUAL THERAPY 1/> REGIONS: CPT

## 2022-05-23 NOTE — FLOWSHEET NOTE
[facility]. [address]  Phone: [phone]   Fax:     [phone]    Physical Therapy Treatment Note/ Progress Report:     Date:  2022    Patient Name:  Demetrice Mata    :  1945  MRN: [id number]    Medical and Treatment Diagnosis Information:  ·   Neck pain on right side  Insurance/Certification information:   [employer]na [facility]  Physician Information:   Roe Butterfield MD ( PCP), neuro at [facility] Dr. Rogelio Stewart of care signed (Y/N): N    Date of Patient follow up with Physician: 2022     Progress Report: []  Yes  []  No     Date Range for reporting period:  Beginnin2022  Ending:     Progress report due (10 Rx/or 30 days whichever is less):      Recertification due (POC duration/ or 90 days whichever is less):      Visit # Insurance/POC Allowable Auth Needed   4/10 40 dollar copay []Yes    [x]No     Functional Outcomes Measure:    Date Assessed: at eval  Test: FOTO  Score:    Pain level:  1/10, reports mild stiffness vs pain right UT 0/10 post session     HISTORY OF INJURY:  Relevant Medical History:  recent change in PD meds reduced from 12 to 11 four times daily which happened during onset as well. Complicated by RTC repairs chuck with residual weakness and intermittent pain. SUBJECTIVE: no adverse reactions from last session \"I feel I have better range everytime I come\"        OBJECTIVE:    Observation:    Test measurements:  Cervical PROM WNL    RESTRICTIONS/PRECAUTIONS: PD, CVA hx, Prostate CA hx, DM, OA, 67 y/o, CAD on blood thinners recent changes in PD meds.      Exercises/Interventions:   Therapeutic Ex (21890)  Min: Resistance/Repetitions Notes   HELD pt denies questions and does in HEPLight resistance shoulder diagonals  10 reps x 3 sets yellow  chuck        BLE lunge PNF BUE together patterns with head watching hands for stretching balance and ROM  10 ea           Nustep  Resistance 6 8 mins         Manual Intervention (10734)  Min:     Cerv mobs/manip Grade 1-3 to dec pain and improve  C3-T4 supine   Thoracic mobs/manip     CT manip     Rib mobilizations     STM Sob, sor 10s x 10 reps x2  SB SCM levators scalene and pecs, abbie    PROM for stretching SCM abbie UT abbie    Limited to light to mod pressure  precuation on blood thinner  Knotting uses, brushing uses   GH AC scap STM and mobs  Abbie UE grade 1-4   UE abbie distraction with oscillations STM to lateral scap borders abbie     NMR re-education (86759)  Min:               Therapeutic Activity (82967)  Min:               Modalities  Min:                  Other Therapeutic Activities:  Pt was educated on PT POC, Diagnosis, Prognosis, pathomechanics as well as frequency and duration of scheduling future physical therapy appointments. Time was also taken on this day to answer all patient questions and participation in PT. Reviewed appointment policy in detail with patient and patient verbalized understanding. Dicussed LVST program and rec for this due to PD dx. Home Exercise Program:    Therapeutic Exercise and NMR EXR  [x] (07030) Provided verbal/tactile cueing for activities related to strengthening, flexibility, endurance, ROM  for improvements in cervical, postural, scapular, scapulothoracic and UE control with self care, reaching, carrying, lifting, house/yardwork, driving/computer work.    [] (91452) Provided verbal/tactile cueing for activities related to improving balance, coordination, kinesthetic sense, posture, motor skill, proprioception  to assist with cervical, scapular, scapulothoracic and UE control with self care, reaching, carrying, lifting, house/yardwork, driving/computer work.     Therapeutic Activities:    [] (58872 or 80567) Provided verbal/tactile cueing for activities related to improving balance, coordination, kinesthetic sense, posture, motor skill, proprioception and motor activation to allow for proper function of cervical, scapular, scapulothoracic and UE control with self care, carrying, lifting, driving/computer work.      Home Exercise Program:    [] (40575) Reviewed/Progressed HEP activities related to strengthening, flexibility, endurance, ROM of cervical, scapular, scapulothoracic and UE control with self care, reaching, carrying, lifting, house/yardwork, driving/computer work  [] (03307) Reviewed/Progressed HEP activities related to improving balance, coordination, kinesthetic sense, posture, motor skill, proprioception of cervical, scapular, scapulothoracic and UE control with self care, reaching, carrying, lifting, house/yardwork, driving/computer work      Manual Treatments:  PROM / STM / Oscillations-Mobs:  G-I, II, III, IV (PA's, Inf., Post.)  [x] (98628) Provided manual therapy to mobilize soft tissue/joints of cervical/CT, scapular GHJ and UE for the purpose of decreasing headache, modulating pain, promoting relaxation,  increasing ROM, reducing/eliminating soft tissue swelling/inflammation/restriction, improving soft tissue extensibility and allowing for proper ROM for normal function with self care, reaching, carrying, lifting, house/yardwork, driving/computer work      Charges:  Timed Code Treatment Minutes: 25 mins Manual, 20 mins TE   Total Treatment Minutes: 45     [] EVAL (LOW) 87547 (typically 20 minutes face-to-face)  [] EVAL (MOD) 07382 (typically 30 minutes face-to-face)  [] EVAL (HIGH) 91140 (typically 45 minutes face-to-face)  [] RE-EVAL     [x] HN(17008) x   1  [] Dry needle 1 or 2 Muscles (82822)  [] NMR (04214) x     [] Dry needle 3+ Muscles (05331)  [x] Manual (62835) x  2 [] Ultrasound (99158) x  [] TA (39113) x     [] Mech Traction (93798)  [] ES(attended) (95560)     [] ES (un) (67107):   [] Vasopump (05089) [] Ionto (12081)   [] Other:    GOALS:  Patient stated goal: reduce your pain, improve range of motion in upper quadrant for ease of driving and any activities with Overall Progression Towards Functional goals/ Treatment Progress Update:  [] Patient is progressing as expected towards functional goals listed. [] Progression is slowed due to complexities/Impairments listed. [] Progression has been slowed due to co-morbidities. [x] Plan just implemented, too soon to assess goals progression <30days   [] Goals require adjustment due to lack of progress  [] Patient is not progressing as expected and requires additional follow up with physician  [] Other    Prognosis for POC: [x] Good [] Fair  [] Poor    Patient requires continued skilled intervention: [x] Yes  [] No        PLAN: See eval  [x] Continue per plan of care [] Alter current plan (see comments)  [x] Plan of care initiated [] Hold pending MD visit [] Discharge    Electronically signed by: Elizabeth Kuo PT DPT    Note: If patient does not return for scheduled/recommended follow up visits, this note will serve as a discharge from care along with the most recent update on progress.

## 2022-05-26 ENCOUNTER — HOSPITAL ENCOUNTER (OUTPATIENT)
Dept: PHYSICAL THERAPY | Age: 77
Setting detail: THERAPIES SERIES
Discharge: HOME | End: 2022-05-26
Payer: MEDICARE

## 2022-05-26 PROCEDURE — 97110 THERAPEUTIC EXERCISES: CPT

## 2022-05-26 PROCEDURE — 97140 MANUAL THERAPY 1/> REGIONS: CPT

## 2022-05-26 NOTE — DISCHARGE SUMMARY
[facility]. [address]  Phone: [phone]   Fax:     [phone]    Physical Therapy Treatment Note/ Progress Report:     Date:  2022    Patient Name:  Jose Juan Porter    :  1945  MRN: [id number]    Medical and Treatment Diagnosis Information:  ·   Neck pain on right side  Insurance/Certification information:   [employer] [facility]  Physician Information:   Charlotte Monge MD ( PCP), neuro at [facility] Dr. Dash Lawrence of care signed (Y/N): N    Date of Patient follow up with Physician: 2022     Progress Report: [x]  Yes  []  No     Date Range for reporting period:  Beginnin2022  Endin2022  Progress report due (10 Rx/or 30 days whichever is less):      Recertification due (POC duration/ or 90 days whichever is less):      Visit # Insurance/POC Allowable Auth Needed   5/10 40 dollar copay []Yes    [x]No     Functional Outcomes Measure:    Date Assessed: at eval  Test: FOTO  Score: 53  D/c score 72 2022    Pain level:  1/10, reports mild stiffness vs pain right UT 0/10 post session     HISTORY OF INJURY:  Relevant Medical History:  recent change in PD meds reduced from 12 to 11 four times daily which happened during onset as well. Complicated by RTC repairs chuck with residual weakness and intermittent pain. SUBJECTIVE: no adverse reactions from last session \"I feel I have better range everytime I come\" \"I feel my goals are met\" Satisfied with care and progress. OBJECTIVE:    Observation:    Test measurements:  Cervical PROM WNL AROM WFL    RESTRICTIONS/PRECAUTIONS: PD, CVA hx, Prostate CA hx, DM, OA, 67 y/o, CAD on blood thinners recent changes in PD meds.      Exercises/Interventions:   Therapeutic Ex (11018)  Min: Resistance/Repetitions Notes   HELD pt denies questions and does in HEPLight resistance shoulder diagonals  10 reps x 3 sets yellow  abbie        BLE lunge PNF BUE together patterns with head watching hands for stretching balance and ROM  10 ea           Nustep  Resistance 6 8 mins         Manual Intervention (53614)  Min:     Cerv mobs/manip Grade 1-3 to dec pain and improve  C3-T4 supine   Thoracic mobs/manip     CT manip     Rib mobilizations     STM Sob, sor 10s x 10 reps x2  SB SCM levators scalene and pecs, abbie    PROM for stretching SCM abbie UT abbei    Limited to light to mod pressure  precuation on blood thinner  Knotting uses, brushing uses   GH AC scap STM and mobs  Abbie UE grade 1-4   UE abbie distraction with oscillations STM to lateral scap borders abbie     NMR re-education (40857)  Min:               Therapeutic Activity (94415)  Min:               Modalities  Min:                  Other Therapeutic Activities:  Pt was educated on PT POC, Diagnosis, Prognosis, pathomechanics as well as frequency and duration of scheduling future physical therapy appointments. Time was also taken on this day to answer all patient questions and participation in PT. Reviewed appointment policy in detail with patient and patient verbalized understanding. Dicussed LVST program and rec for this due to PD dx. Home Exercise Program:    Therapeutic Exercise and NMR EXR  [x] (73300) Provided verbal/tactile cueing for activities related to strengthening, flexibility, endurance, ROM  for improvements in cervical, postural, scapular, scapulothoracic and UE control with self care, reaching, carrying, lifting, house/yardwork, driving/computer work.    [] (34796) Provided verbal/tactile cueing for activities related to improving balance, coordination, kinesthetic sense, posture, motor skill, proprioception  to assist with cervical, scapular, scapulothoracic and UE control with self care, reaching, carrying, lifting, house/yardwork, driving/computer work.     Therapeutic Activities:    [] (44242 or ) Provided verbal/tactile cueing for activities related to improving balance, coordination, kinesthetic sense, posture, motor skill, proprioception and motor activation to allow for proper function of cervical, scapular, scapulothoracic and UE control with self care, carrying, lifting, driving/computer work.      Home Exercise Program:    [] (14772) Reviewed/Progressed HEP activities related to strengthening, flexibility, endurance, ROM of cervical, scapular, scapulothoracic and UE control with self care, reaching, carrying, lifting, house/yardwork, driving/computer work  [] (77020) Reviewed/Progressed HEP activities related to improving balance, coordination, kinesthetic sense, posture, motor skill, proprioception of cervical, scapular, scapulothoracic and UE control with self care, reaching, carrying, lifting, house/yardwork, driving/computer work      Manual Treatments:  PROM / STM / Oscillations-Mobs:  G-I, II, III, IV (PA's, Inf., Post.)  [x] (76578) Provided manual therapy to mobilize soft tissue/joints of cervical/CT, scapular GHJ and UE for the purpose of decreasing headache, modulating pain, promoting relaxation,  increasing ROM, reducing/eliminating soft tissue swelling/inflammation/restriction, improving soft tissue extensibility and allowing for proper ROM for normal function with self care, reaching, carrying, lifting, house/yardwork, driving/computer work      Charges:  Timed Code Treatment Minutes: 10 mins Manual, 30 mins TE   Total Treatment Minutes: 40     [] EVAL (LOW) 00812 (typically 20 minutes face-to-face)  [] EVAL (MOD) 56160 (typically 30 minutes face-to-face)  [] EVAL (HIGH) 10314 (typically 45 minutes face-to-face)  [] RE-EVAL     [x] CF(40076) x   2  [] Dry needle 1 or 2 Muscles (51036)  [] NMR (48996) x     [] Dry needle 3+ Muscles (65178)  [x] Manual (12483) x  1  [] Ultrasound (22529) x  [] TA (69555) x     [] Mech Traction (84314)  [] ES(attended) (64073)     [] ES (un) (75317):   [] Vasopump (63667) [] Ionto (40058)   [] Other:  Interventions included therapeutic ex, NMR, manual therapy. GOALS:  Patient stated goal: reduce your pain, improve range of motion in upper quadrant for ease of driving and any activities with head turns  [] Progressing: [x] Met: [] Not Met: [] Adjusted    Therapist goals for Patient:   Short Term Goals: To be achieved in: 2 weeks  1. Independent in HEP and progression per patient tolerance, in order to prevent re-injury. [] Progressing: [x] Met: [] Not Met: [] Adjusted  2. Patient will have a decrease in pain to resting pain 0/10, worse pain from 10/10 to less than/=4/10 facilitate improvement in movement, function, and ADLs as indicated by Functional Deficits. [] Progressing: [x] Met: [] Not Met: [] Adjusted    Long Term Goals: To be achieved in: 5 weeks  1. Increase FOTO functional outcome score from 53 to 65  to assist with reaching prior level of function. [] Progressing: [x] Met: [] Not Met: [] Adjusted  2. Patient will demonstrate increased AROM without inc complaints by exhibiting  c spine AROM of SB [place] >/=20 deg ( from 15 deg at Eval), Rotation [place] to >/=55 deg ( from left/right 35/40deg at Eval), ext to >/=25 deg ( from 9deg at eval), flexion to >/=40 ( from 35 at eval)   of cervical/thoracic spine to allow for proper joint functioning as indicated by patients Functional Deficits. [] Progressing: [x] Met: [] Not Met: [] Adjusted  3. UEs MMT 5/5 ( inc 1 muscle grade from eval) for ease of reaching/pushing/pulling carrying/lifting and regaining maxine for golf and Boxing PD classes  [] Progressing: [x] Met: Exception SHILOH ER [] Not Met: [] Adjusted     ASSESSMENT:  Therapy and pt goals met. D/cd with progressive HEP.    Treatment/Activity Tolerance:  [x] Patient tolerated treatment well [] Patient limited by fatique  [] Patient limited by pain  [] Patient limited by other medical complications  [] Other:     Overall Progression Towards Functional goals/ Treatment Progress Update:  [x] Patient is progressing as expected towards functional goals listed. [] Progression is slowed due to complexities/Impairments listed. [] Progression has been slowed due to co-morbidities. [] Plan just implemented, too soon to assess goals progression <30days   [] Goals require adjustment due to lack of progress  [] Patient is not progressing as expected and requires additional follow up with physician  [] Other    Prognosis for POC: [x] Good [] Fair  [] Poor  Patient requires continued skilled intervention: [x] Yes  [] No    PLAN: [x] Discharge    Electronically signed by: Rodger Joaquin PT DPT    Note: If patient does not return for scheduled/recommended follow up visits, this note will serve as a discharge from care along with the most recent update on progress.

## 2022-09-12 ENCOUNTER — OFFICE VISIT (OUTPATIENT)
Dept: ORTHOPEDIC SURGERY | Age: 77
End: 2022-09-12
Payer: MEDICARE

## 2022-09-12 VITALS — HEIGHT: 68 IN | BODY MASS INDEX: 27.89 KG/M2 | WEIGHT: 184 LBS | RESPIRATION RATE: 16 BRPM

## 2022-09-12 DIAGNOSIS — M75.101 RIGHT ROTATOR CUFF TEAR ARTHROPATHY: ICD-10-CM

## 2022-09-12 DIAGNOSIS — M12.811 RIGHT ROTATOR CUFF TEAR ARTHROPATHY: ICD-10-CM

## 2022-09-12 DIAGNOSIS — M25.511 RIGHT SHOULDER PAIN, UNSPECIFIED CHRONICITY: Primary | ICD-10-CM

## 2022-09-12 PROCEDURE — 99213 OFFICE O/P EST LOW 20 MIN: CPT | Performed by: PHYSICIAN ASSISTANT

## 2022-09-12 PROCEDURE — 1123F ACP DISCUSS/DSCN MKR DOCD: CPT | Performed by: PHYSICIAN ASSISTANT

## 2022-09-12 PROCEDURE — 20610 DRAIN/INJ JOINT/BURSA W/O US: CPT | Performed by: PHYSICIAN ASSISTANT

## 2022-09-12 RX ORDER — BUPIVACAINE HYDROCHLORIDE 2.5 MG/ML
2 INJECTION, SOLUTION INFILTRATION; PERINEURAL ONCE
Status: COMPLETED | OUTPATIENT
Start: 2022-09-12 | End: 2022-09-12

## 2022-09-12 RX ORDER — TRIAMCINOLONE ACETONIDE 40 MG/ML
40 INJECTION, SUSPENSION INTRA-ARTICULAR; INTRAMUSCULAR ONCE
Status: COMPLETED | OUTPATIENT
Start: 2022-09-12 | End: 2022-09-12

## 2022-09-12 RX ADMIN — BUPIVACAINE HYDROCHLORIDE 5 MG: 2.5 INJECTION, SOLUTION INFILTRATION; PERINEURAL at 08:34

## 2022-09-12 RX ADMIN — TRIAMCINOLONE ACETONIDE 40 MG: 40 INJECTION, SUSPENSION INTRA-ARTICULAR; INTRAMUSCULAR at 08:34

## 2022-09-12 NOTE — PROGRESS NOTES
Subjective:      Patient ID: Yudy Jeong is a 68 y.o. male who presents to the office for an initial evaluation of right shoulder pain and inability to raise his arm over his shoulder. The symptoms have been present for at least 6 months or longer. He does not recall a history of recent injury. He does have a remote history of prior rotator cuff repair 1995. He is in a boxing class designed to help with his Parkinson's. Pain Scale at times as severe as 8/10 VAS. Location of pain right shoulder. Pain is worse with activity. Pain improves with rest.   Previous treatments have included Tylenol. Review of Systems:  I have reviewed the clinically relevant past medical history, medications, allergies, family history, social history, and 13 point Review of Systems from the patient's recent history form & documented any details relevant to today's presenting complaints in the history above. The patient's self-reported past medical history, medications, allergies, family history, social history, and Review of Systems form from today's date have been scanned into the chart under the \"Media\" tab. As noted in the HPI. Negative for fever or chills. Positive for poly-joint pain, swelling and stiffness. Negative for numbness or tingling.      Past Medical History:   Diagnosis Date    CAD (coronary artery disease)     Cerebral artery occlusion with cerebral infarction ([address] 05/2016    left frontal lobe    Diabetes mellitus, type 2 (HCC)     Diverticulitis     Hyperlipidemia     Osteoarthritis     Positive PPD, treated     treated with INH    Prostate cancer ([facility] [address] 4/13    Bernie Zurita active surveillance       Family History   Problem Relation Age of Onset    COPD Father     Diabetes Father     Heart Attack Father     Cancer Father 70        Prostate    Heart Disease Father     Diabetes Brother        Past Surgical History:   Procedure Laterality Date    APPENDECTOMY  1969    CORONARY ARTERY BYPASS GRAFT 09/13/2016    3V    EYE SURGERY Left 2004    cataract surgery-right    HERNIA REPAIR Bilateral     right ing, left ing    KNEE ARTHROPLASTY Left 01/08/2014    Left total knee replacement    PROSTATE BIOPSY  03/02/2022    ROTATOR CUFF REPAIR  1995, 2007    right, left    TONSILLECTOMY  1958       Social History     Occupational History    Not on file   Tobacco Use    Smoking status: Never    Smokeless tobacco: Never   Substance and Sexual Activity    Alcohol use: No    Drug use: No    Sexual activity: Yes     Partners: Female       Current Outpatient Medications   Medication Sig Dispense Refill    NATEGLINIDE PO Take by mouth 3 times daily (before meals)      atorvastatin (LIPITOR) 40 MG tablet TAKE 1 TABLET BY MOUTH EVERY DAY 90 tablet 1    levothyroxine (SYNTHROID) 75 MCG tablet TAKE 1 TABLET BY MOUTH EVERY DAY 90 tablet 1    ondansetron (ZOFRAN ODT) 4 MG disintegrating tablet Take 1 tablet by mouth every 8 hours as needed for Nausea (Patient not taking: Reported on 5/6/2022) 20 tablet 0    dicyclomine (BENTYL) 10 MG capsule Take 1 capsule by mouth every 6 hours as needed (cramps) (Patient not taking: Reported on 5/6/2022) 20 capsule 0    Cyanocobalamin (VITAMIN B-12) 1000 MCG SUBL Place 1 tablet under the tongue daily      pioglitazone (ACTOS) 15 MG tablet Take 15 mg by mouth daily      carbidopa-levodopa (SINEMET)  MG per tablet Take 2.5 tablets by mouth 3 times daily       aspirin 81 MG EC tablet Take 81 mg by mouth nightly      metFORMIN (GLUCOPHAGE-XR) 500 MG extended release tablet Take 1,000 mg by mouth 2 times daily        No current facility-administered medications for this visit. Objective:     He is alert, oriented x 3, pleasant, well nourished, developed and in no   acute distress. Resp 16   Ht 5' 8\" (1.727 m)   Wt 184 lb (83.5 kg)   BMI 27.98 kg/m²      Examination of the right shoulder: There is no deformity.   There is a well-healed incision from prior rotator cuff repair  There is no erythema or soft tissue swelling. There is no significant joint effusion. Deltoid region is  tender to palpation. AC Joint is not tender to palpation. Shoulder Active ROM -    FF 80  IR to L5 ER w/ arm at side 45. Belly Press Test positive. Bear Hug Test positive. Drop Arm Test negative. Cross Arm Abduction Test positive. Upper extremities:  He has 5/5 strength of his interosseous muscles, wrist dorsiflexors and volarflexors, biceps, triceps, deltoids, and internal and external rotators of his shoulders, bilaterally. His biceps, triceps, bracheoradialis, quadriceps and achilles reflexes are 2+, bilaterally. Sensation is intact to light touchfrom C6 to C8. He has no clonus and negative Patel's bilaterally. Examination of the upper extremities shows intact perfusion to all extremities. He has no cyanosis and digigts are warm to touch, capillary refill is less than 2 seconds. He has no edema noted. He has intact skin without lacerations or abrasions, no significant erythema, rashes or skin lesions. X Rays: were performed in the office today:   AP, Y and Axillary views of the right shoulder. There is no evidence of fracture or dislocation. The subacromial space is  moderately  narrowed. There is mild AC joint arthritis. There is moderate Gleno-Humeral arthritis. 3 metallic anchors noted in the region of the supraspinatus footprint. Additional Tests reviewed: none  Additional Outside Records reviewed: none    Diagnosis:       ICD-10-CM    1. Right shoulder pain, unspecified chronicity  M25.511 XR SHOULDER RIGHT (MIN 2 VIEWS)      2.  Right rotator cuff tear arthropathy  M75.101 OH ARTHROCENTESIS ASPIR&/INJ MAJOR JT/BURSA W/O US    M12.811 bupivacaine (MARCAINE) 0.25 % injection 5 mg     triamcinolone acetonide (KENALOG-40) injection 40 mg           Assessment and Plan:       Assessment:  Right shoulder pain and inability to raise his arm due to rotator cuff arthropathy. History of prior rotator cuff repair with 3 metallic anchors performed in 1995. I had an extensive discussion with Mr. Kody Main regarding the natural history, etiology, and long term consequences of his condition. I have presented reasonable alternatives to the patient's proposed care, treatment, and services. Risks and benefits of the treatment options also reviewed in detail. I have outlined a treatment plan with them. He has had full opportunity to ask his questions. I have answered them all to his satisfaction. I feel that Mr. Kody Main understands our discussion today. Weight loss, activity modification, home exercise therapy program, NSAID'S, dietary changes have been discussed as a means to help control the symptoms. Non surgical options including cortisone injections were discussed. Surgical option, shoulder arthroplasty discussed. Plan:  Medications-   OTC NSAIDS discussed. The most common side effects from NSAIDs are stomachaches, heartburn, and nausea. NSAIDs may irritate the stomach lining. If the medicine upsets your stomach, you can try taking it with food. But if that doesn't help, talk with your doctor to make sure it's not a more serious problem, such as a stomach ulcer or bleeding in the stomach or intestines. Using NSAIDs may:  Lead to high blood pressure. Make symptoms of heart failure worse. Raise the risk of heart attack, stroke, kidney damage, and skin reactions. Your risks are greater if you take NSAIDs at higher doses or for longer than the label says. People who are older than 72 or who have heart, stomach, or intestinal disease have a higher risk for problems. Procedures- Cortisone  Injection  PROCEDURE NOTE:  Pre op Diagnosis: Shoulder pain  With Kody Main permission, his right shoulder was prepped  in standard sterile fashion with  Alcohol.   2 cc of 0.25% Marcaine and 1 cc of Kenalog 40 mg was injected into the right lateral subacromial space without difficulty. He tolerated this well without difficulty. A band-aid was applied. He was advised to ice the shoulder for 15-20 minutes to relieve any injection site related pain. Follow up-    Call or return to clinic if these symptoms worsen or fail to improve as anticipated. Rick Whitlock PA-C   Senior Physician Assistant   [facility]                                         Disclaimer: This note was generated with use of a verbal recognition program (DRAGON) and an attempt was made to check for errors. It is possible that there are still dictated errors within this office note. If so, please bring any significant errors to my attention for an addendum. All efforts were made to ensure that this office note is accurate.

## 2022-10-05 SDOH — HEALTH STABILITY: PHYSICAL HEALTH: ON AVERAGE, HOW MANY DAYS PER WEEK DO YOU ENGAGE IN MODERATE TO STRENUOUS EXERCISE (LIKE A BRISK WALK)?: 4 DAYS

## 2022-10-05 SDOH — HEALTH STABILITY: PHYSICAL HEALTH: ON AVERAGE, HOW MANY MINUTES DO YOU ENGAGE IN EXERCISE AT THIS LEVEL?: 20 MIN

## 2022-10-06 ENCOUNTER — OFFICE VISIT (OUTPATIENT)
Dept: ORTHOPEDIC SURGERY | Age: 77
End: 2022-10-06
Payer: MEDICARE

## 2022-10-06 VITALS — BODY MASS INDEX: 27.89 KG/M2 | HEIGHT: 68 IN | WEIGHT: 184 LBS

## 2022-10-06 DIAGNOSIS — M12.811 RIGHT ROTATOR CUFF TEAR ARTHROPATHY: Primary | ICD-10-CM

## 2022-10-06 DIAGNOSIS — M75.101 RIGHT ROTATOR CUFF TEAR ARTHROPATHY: Primary | ICD-10-CM

## 2022-10-06 DIAGNOSIS — S46.211A RUPTURE OF RIGHT PROXIMAL BICEPS TENDON, INITIAL ENCOUNTER: ICD-10-CM

## 2022-10-06 PROCEDURE — 1123F ACP DISCUSS/DSCN MKR DOCD: CPT | Performed by: PHYSICIAN ASSISTANT

## 2022-10-06 PROCEDURE — 99212 OFFICE O/P EST SF 10 MIN: CPT | Performed by: PHYSICIAN ASSISTANT

## 2022-10-06 NOTE — PROGRESS NOTES
Subjective:      Patient ID: Judi Redding is a 68 y.o. male who is here for follow up evaluation of right shoulder, biceps deformity and bruising. He was seen in the office on 9/12/2022 and underwent a right shoulder subacromial steroid injection for rotator cuff arthropathy. He states his symptoms were improving after the injection but still was experiencing difficulty raising his arm overhead. His wife had bought him a new snowblower and he was using the pull string to start the snowblower when he developed increased pain in the right shoulder followed by a Forrest muscle deformity and bruising. This occurred 1 week ago. He states now his right shoulder pain has significantly improved as well as his function. Review Of Systems:   Negative for fever or chills. Past Medical History:   Diagnosis Date    CAD (coronary artery disease)     Cerebral artery occlusion with cerebral infarction ([facility] [address] 05/2016    left frontal lobe    Diabetes mellitus, type 2 (HCC)     Diverticulitis     Hyperlipidemia     Osteoarthritis     Positive PPD, treated     treated with INH    Prostate cancer ([facility] [address] 4/13    Alexa Lyons active surveillance       Family History   Problem Relation Age of Onset    COPD Father     Diabetes Father     Heart Attack Father     Cancer Father 70        Prostate    Heart Disease Father     Diabetes Brother        Past Surgical History:   Procedure Laterality Date    APPENDECTOMY  1969    CORONARY ARTERY BYPASS GRAFT  09/13/2016    3V    EYE SURGERY Left 2004    cataract surgery-right    HERNIA REPAIR Bilateral     right ing, left ing    KNEE ARTHROPLASTY Left 01/08/2014    Left total knee replacement    PROSTATE BIOPSY  03/02/2022    [facility], 2007    right, left    TONSILLECTOMY  1958       Social History     Occupational History    Not on file   Tobacco Use    Smoking status: Never    Smokeless tobacco: Never   Substance and Sexual Activity    Alcohol use: No    Drug use:  No Sexual activity: Yes     Partners: Female       Current Outpatient Medications   Medication Sig Dispense Refill    NATEGLINIDE PO Take by mouth 3 times daily (before meals)      atorvastatin (LIPITOR) 40 MG tablet TAKE 1 TABLET BY MOUTH EVERY DAY 90 tablet 1    levothyroxine (SYNTHROID) 75 MCG tablet TAKE 1 TABLET BY MOUTH EVERY DAY 90 tablet 1    ondansetron (ZOFRAN ODT) 4 MG disintegrating tablet Take 1 tablet by mouth every 8 hours as needed for Nausea (Patient not taking: Reported on 5/6/2022) 20 tablet 0    dicyclomine (BENTYL) 10 MG capsule Take 1 capsule by mouth every 6 hours as needed (cramps) (Patient not taking: Reported on 5/6/2022) 20 capsule 0    Cyanocobalamin (VITAMIN B-12) 1000 MCG SUBL Place 1 tablet under the tongue daily      pioglitazone (ACTOS) 15 MG tablet Take 15 mg by mouth daily      carbidopa-levodopa (SINEMET)  MG per tablet Take 2.5 tablets by mouth 3 times daily       aspirin 81 MG EC tablet Take 81 mg by mouth nightly      metFORMIN (GLUCOPHAGE-XR) 500 MG extended release tablet Take 1,000 mg by mouth 2 times daily        No current facility-administered medications for this visit. Objective:     He is alert, oriented x 3, pleasant, well nourished, developed and in no   acute distress. Ht 5' 8\" (1.727 m)   Wt 184 lb (83.5 kg)   BMI 27.98 kg/m²      Examination of the right shoulder: There is a susan deformity. There is a well-healed incision from prior rotator cuff repair  There is mild ecchymosis within the biceps muscle. There is no erythema or soft tissue swelling. There is no significant joint effusion. Deltoid region is  tender to palpation. AC Joint is not tender to palpation. Shoulder Active ROM -      IR to L5 ER w/ arm at side 45. X Rays: not performed in the office today:       Diagnosis:       ICD-10-CM    1. Right rotator cuff tear arthropathy  M75.101     M12.811       2.  Rupture of right proximal biceps tendon, initial encounter L79.179U            Assessment and Plan:       Assessment:  Very pleasant 59-year-old gentleman with Parkinson's disease. History of rotator cuff arthropathy status post rotator cuff repair years ago. He was doing better after a steroid injection on 9/12/2022. Hold on a snowblower and ruptured his biceps tendon. He has developed a Forrest muscle but his right shoulder pain and function has actually improved. I suspect he has a moderate-sized rotator cuff tear which led to fraying of the biceps tendon and then eventually rupturing. I had an extensive discussion with Mr. Tran Branch regarding the natural history, etiology, and long term consequences of his condition. I have presented reasonable alternatives to the patient's proposed care, treatment, and services. Risks and benefits of the treatment options also reviewed in detail. I have outlined a treatment plan with them. He has had full opportunity to ask his questions. I have answered them all to his satisfaction. I feel that Mr. Tran Branch understands our discussion today. Plan:  PT-   A home exercise program was instructed today including ROM exercises and strengthening exercises. The patient verbalized understanding of these exercises as well as the importance of the exercise program to promote return of normal function. If pain intensifies or other problems arise you are to notify the office. Procedures-I had discussed periodic steroid injections as well as reverse shoulder arthroplasty when his pain worsens and function deteriorate. Follow up-    Call or return to clinic if these symptoms worsen or fail to improve as anticipated. Zaire Tony PA-C   Senior Physician Assistant   [facility]                                         Disclaimer:   This note was generated with use of a verbal recognition program (DRAGON) and an attempt was made to check for errors. It is possible that there are still dictated errors within this office note. If so, please bring any significant errors to my attention for an addendum. All efforts were made to ensure that this office note is accurate.

## 2022-10-14 RX ORDER — LEVOTHYROXINE SODIUM 0.07 MG/1
TABLET ORAL
Qty: 90 TABLET | Refills: 0 | Status: SHIPPED | OUTPATIENT
Start: 2022-10-14

## 2022-10-14 RX ORDER — ATORVASTATIN CALCIUM 40 MG/1
TABLET, FILM COATED ORAL
Qty: 90 TABLET | Refills: 0 | Status: SHIPPED | OUTPATIENT
Start: 2022-10-14

## 2022-10-14 NOTE — TELEPHONE ENCOUNTER
Last appointment: 5/6/2022  Next appointment: 11/4/2022  Last refill: 4/17/22  Please advise as DOD.  THank you

## 2022-12-12 PROBLEM — R06.09 DOE (DYSPNEA ON EXERTION): Status: RESOLVED | Noted: 2021-07-09 | Resolved: 2022-12-12

## 2022-12-12 NOTE — PROGRESS NOTES
Date of Visit:  2022    CC: Zee Chavez (: 1945) is a 68 y.o. male, established patient, here for evaluation/re-evaluation of the following medical concerns:    ASSESSMENT/PLAN:  Type 2 diabetes mellitus with stage 3a chronic kidney disease, without long-term current use of insulin ([facility] [address]  Assessment & Plan:  Well-controlled per recent HgbA1c. However, cardiologist has expressed concerns about CV risks associated with Actos- he will discuss alternative with endocrinologist at next appointment. He did well on Trulicity, but this medication was too expensive. Renal function stable. He will continue to avoid NSAIDs. Essential hypertension  Assessment & Plan:  Well-controlled. Continue current antihypertensive regimen. Pure hypercholesterolemia  Assessment & Plan:  At goal on current dose of Lipitor- continue. Hypothyroidism due to acquired atrophy of thyroid  Assessment & Plan:  Fatigue is likely multifactorial.  Recent TSH normal.  Continue current dose of Synthroid. Primary parkinsonism [facility])  Assessment & Plan:  More dyskinesias today, but overall stable per neurologist.  He was encouraged to consider PT for strength and balance training once his shoulder and urinary issues have been addressed. Vitamin B12 deficiency  Assessment & Plan:  At goal on 1000 mcg of SL B12/day per recent labs at [place]- continue. Right rotator cuff tear arthropathy  Assessment & Plan:  Requesting second ortho opinion on treatment options- referred to Dr. Lorena Goldsmith. Urinary frequency  Assessment & Plan:  No improvement with samples of medication provided by urologist- patient unsure what he was given. Unclear whether symptoms due to overactive bladder, possibly exacerbated by Parkinson's, or prostate issue. He has follow up appointment with urologist next month, but will call to be seen sooner if symptoms worsen. Return in about 6 months (around 2023) for MEDICARE AWV.      Vitals:    22 2569 BP: 126/66   Pulse: 76   Resp: 16   SpO2: 97%   Weight: 189 lb 14.4 oz (86.1 kg)   Height: 5' 8\" (1.727 m)      Estimated body mass index is 28.87 kg/m² as calculated from the following:    Height as of this encounter: 5' 8\" (1.727 m). Weight as of this encounter: 189 lb 14.4 oz (86.1 kg). Wt Readings from Last 3 Encounters:   12/13/22 189 lb 14.4 oz (86.1 kg)   10/06/22 184 lb (83.5 kg)   09/12/22 184 lb (83.5 kg)     BP Readings from Last 3 Encounters:   12/13/22 126/66   05/06/22 122/82   01/14/22 132/78     HPI  Diabetes Mellitus Type 2: Current symptoms/problems include none. Last HgbA1c 6.4- 10/26/22 per endocrinology. Home blood sugar records: fasting range: 130-140  Any episodes of hypoglycemia? no  Daily Aspirin? Yes  Diet/exercise: No particular diet, but trying to avoid simple carbs. Exercise: walking. Weight up about 5 pounds over the past 3 months. Hypertension/CKD:  Home blood pressure monitoring: No.  He is adherent to a low sodium diet. Patient denies chest pain, shortness of breath, headache, and palpitations. Has occasional orthostatic symptoms. Antihypertensive medication side effects: no medication side effects noted. Use of agents associated with hypertension: none. GFR 71- 11/16/22. Electrolytes normal.    Hyperlipidemia:  No new myalgias or GI upset on atorvastatin (Lipitor). LDL 50, triglycerides 50, HDL 61- 11/16/22. Hypothyroidism: Recent symptoms: Fatigue. He denies cold intolerance, heat intolerance, constipation, diarrhea and palpitations. Patient is taking his medication consistently on an empty stomach. Last TSH 2.76- 11/16/22 per endocrinology. Normal B12 level. Parkinson's Dz:  Last neurology visit at [facility] 11/17/22- no medication changes, but suggested PT for balance and gait. ROS  As documented above    Physical Exam  Constitutional:       General: He is not in acute distress. Appearance: He is well-developed.    Eyes:      Conjunctiva/sclera: Conjunctivae normal.   Neck:      Thyroid: No thyroid mass or thyromegaly. Vascular: No carotid bruit. Cardiovascular:      Rate and Rhythm: Normal rate and regular rhythm. Heart sounds: Normal heart sounds. No murmur heard. No friction rub. No gallop. Pulmonary:      Effort: Pulmonary effort is normal. No respiratory distress. Breath sounds: Normal breath sounds. No wheezing or rales. Abdominal:      General: Bowel sounds are normal. There is no distension. Palpations: Abdomen is soft. Tenderness: There is no abdominal tenderness. Musculoskeletal:      Right lower leg: No edema. Left lower leg: No edema. Comments: Shoulder exam per ortho   Skin:     General: Skin is warm and dry. Findings: No erythema or rash. Neurological:      Mental Status: He is alert and oriented to person, place, and time. Cranial Nerves: No cranial nerve deficit. Sensory: No sensory deficit. Coordination: Coordination normal.      Comments: Coarse resting tremor left hand. Soft voice. Diffuse dyskinesias. Detailed neuro exam deferred to neurologist.    Psychiatric:         Behavior: Behavior normal.         Thought Content: Thought content normal.         Judgment: Judgment normal.     On this date 12/13/2022 I have spent 30 minutes reviewing previous notes, test results and face to face with the patient discussing the diagnosis and importance of compliance with the treatment plan as well as documenting on the day of the visit. --Luz Ashley MD on 12/13/2022 at 5:33 PM    An electronic signature was used to authenticate this note.

## 2022-12-13 ENCOUNTER — OFFICE VISIT (OUTPATIENT)
Dept: INTERNAL MEDICINE CLINIC | Age: 77
End: 2022-12-13
Payer: MEDICARE

## 2022-12-13 VITALS
OXYGEN SATURATION: 97 % | DIASTOLIC BLOOD PRESSURE: 66 MMHG | HEART RATE: 76 BPM | SYSTOLIC BLOOD PRESSURE: 126 MMHG | WEIGHT: 189.9 LBS | HEIGHT: 68 IN | BODY MASS INDEX: 28.78 KG/M2 | RESPIRATION RATE: 16 BRPM

## 2022-12-13 DIAGNOSIS — M12.811 RIGHT ROTATOR CUFF TEAR ARTHROPATHY: ICD-10-CM

## 2022-12-13 DIAGNOSIS — G20 PRIMARY PARKINSONISM (HCC): ICD-10-CM

## 2022-12-13 DIAGNOSIS — M75.101 RIGHT ROTATOR CUFF TEAR ARTHROPATHY: ICD-10-CM

## 2022-12-13 DIAGNOSIS — E11.22 TYPE 2 DIABETES MELLITUS WITH STAGE 3A CHRONIC KIDNEY DISEASE, WITHOUT LONG-TERM CURRENT USE OF INSULIN (HCC): Primary | ICD-10-CM

## 2022-12-13 DIAGNOSIS — E53.8 VITAMIN B12 DEFICIENCY: ICD-10-CM

## 2022-12-13 DIAGNOSIS — N18.31 TYPE 2 DIABETES MELLITUS WITH STAGE 3A CHRONIC KIDNEY DISEASE, WITHOUT LONG-TERM CURRENT USE OF INSULIN (HCC): Primary | ICD-10-CM

## 2022-12-13 DIAGNOSIS — E78.00 PURE HYPERCHOLESTEROLEMIA: ICD-10-CM

## 2022-12-13 DIAGNOSIS — E03.4 HYPOTHYROIDISM DUE TO ACQUIRED ATROPHY OF THYROID: ICD-10-CM

## 2022-12-13 DIAGNOSIS — R35.0 URINARY FREQUENCY: ICD-10-CM

## 2022-12-13 DIAGNOSIS — I10 ESSENTIAL HYPERTENSION: ICD-10-CM

## 2022-12-13 PROCEDURE — 99214 OFFICE O/P EST MOD 30 MIN: CPT | Performed by: INTERNAL MEDICINE

## 2022-12-13 PROCEDURE — 3078F DIAST BP <80 MM HG: CPT | Performed by: INTERNAL MEDICINE

## 2022-12-13 PROCEDURE — 3074F SYST BP LT 130 MM HG: CPT | Performed by: INTERNAL MEDICINE

## 2022-12-13 PROCEDURE — 1123F ACP DISCUSS/DSCN MKR DOCD: CPT | Performed by: INTERNAL MEDICINE

## 2022-12-13 RX ORDER — LEVOTHYROXINE SODIUM 0.07 MG/1
TABLET ORAL
Qty: 90 TABLET | Refills: 1 | Status: SHIPPED | OUTPATIENT
Start: 2022-12-13

## 2022-12-13 RX ORDER — ATORVASTATIN CALCIUM 40 MG/1
TABLET, FILM COATED ORAL
Qty: 90 TABLET | Refills: 1 | Status: SHIPPED | OUTPATIENT
Start: 2022-12-13

## 2022-12-13 SDOH — ECONOMIC STABILITY: FOOD INSECURITY: WITHIN THE PAST 12 MONTHS, YOU WORRIED THAT YOUR FOOD WOULD RUN OUT BEFORE YOU GOT MONEY TO BUY MORE.: NEVER TRUE

## 2022-12-13 SDOH — ECONOMIC STABILITY: FOOD INSECURITY: WITHIN THE PAST 12 MONTHS, THE FOOD YOU BOUGHT JUST DIDN'T LAST AND YOU DIDN'T HAVE MONEY TO GET MORE.: NEVER TRUE

## 2022-12-13 ASSESSMENT — SOCIAL DETERMINANTS OF HEALTH (SDOH): HOW HARD IS IT FOR YOU TO PAY FOR THE VERY BASICS LIKE FOOD, HOUSING, MEDICAL CARE, AND HEATING?: NOT HARD AT ALL

## 2022-12-13 NOTE — ASSESSMENT & PLAN NOTE
More dyskinesias today, but overall stable per neurologist.  He was encouraged to consider PT for strength and balance training once his shoulder and urinary issues have been addressed.

## 2022-12-13 NOTE — ASSESSMENT & PLAN NOTE
Well-controlled per recent HgbA1c. However, cardiologist has expressed concerns about CV risks associated with Actos- he will discuss alternative with endocrinologist at next appointment. He did well on Trulicity, but this medication was too expensive. Renal function stable. He will continue to avoid NSAIDs.

## 2022-12-13 NOTE — ASSESSMENT & PLAN NOTE
No improvement with samples of medication provided by urologist- patient unsure what he was given. Unclear whether symptoms due to overactive bladder, possibly exacerbated by Parkinson's, or prostate issue. He has follow up appointment with urologist next month, but will call to be seen sooner if symptoms worsen.

## 2022-12-14 NOTE — PATIENT INSTRUCTIONS
[employer] [address]: [phone] [facility]  Personalized Preventive Plan for Dimas Cole - 1/8/2021  Medicare offers a range of preventive health benefits. Some of the tests and screenings are paid in full while other may be subject to a deductible, co-insurance, and/or copay. Some of these benefits include a comprehensive review of your medical history including lifestyle, illnesses that may run in your family, and various assessments and screenings as appropriate. After reviewing your medical record and screening and assessments performed today your provider may have ordered immunizations, labs, imaging, and/or referrals for you. A list of these orders (if applicable) as well as your Preventive Care list are included within your After Visit Summary for your review. Other Preventive Recommendations:    · A preventive eye exam performed by an eye specialist is recommended every 1-2 years to screen for glaucoma; cataracts, macular degeneration, and other eye disorders. · A preventive dental visit is recommended every 6 months. · Try to get at least 150 minutes of exercise per week or 10,000 steps per day on a pedometer . · Order or download the FREE \"Exercise & Physical Activity: Your Everyday Guide\" from [employer]. Call 8-405.547.6551 or search [employer] online. · You need 5929-5803 mg of calcium and 9360-9166 IU of vitamin D per day. It is possible to meet your calcium requirement with diet alone, but a vitamin D supplement is usually necessary to meet this goal.  · When exposed to the sun, use a sunscreen that protects against both UVA and UVB radiation with an SPF of 30 or greater. Reapply every 2 to 3 hours or after sweating, drying off with a towel, or swimming. · Always wear a seat belt when traveling in a car. Always wear a helmet when riding a bicycle or motorcycle.
Yes

## 2023-01-16 LAB — PSA, TOTAL: 3.44 NG/ML (ref 0–4)

## 2023-01-20 ENCOUNTER — TELEPHONE (OUTPATIENT)
Dept: ORTHOPEDIC SURGERY | Age: 78
End: 2023-01-20

## 2023-01-20 NOTE — TELEPHONE ENCOUNTER
Faxed all medical records (Niesha Lucio) to Dr. Florentino Garza @ [facility] @ [phone]    Pushed images thru PACS

## 2023-02-01 PROBLEM — M19.011 OSTEOARTHRITIS OF RIGHT GLENOHUMERAL JOINT: Status: ACTIVE | Noted: 2022-09-12

## 2023-02-01 NOTE — PROGRESS NOTES
Preoperative Consultation      Ankit Silveira  YOB: 1945    Date of Service:  2/3/2023    Chief Complaint   Patient presents with    Pre-op Exam     Right shoulder arthroplasty on 2023 @ [facility]/ Dr. Charles Locke. Fax #[phone]       HPI:    Ankit Silveira (: 1945) is a 68 y.o. male, established patient, here for a preoperative consultation at the request of surgeon, Dr. Charles Locke, who plans on performing right shoulder surgery on  at [address] [place] [address]      Planned anesthesia: General   Known anesthesia problems: None   Bleeding risk: No recent or remote history of abnormal bleeding  Personal or FH of DVT/PE: No   Recent steroid use: no  Exercise tolerance before surgery at least 4 METS (Can walk up a flight of steps or a hill or walk on level ground at 3 to 4 mph): YES   Patient objection to receiving blood products: No    Patient Active Problem List   Diagnosis    Hyperlipidemia    Neck pain on right side    Prostate cancer ([address]    Type 2 diabetes mellitus with stage 3a chronic kidney disease, without long-term current use of insulin ([address]    Hypothyroidism due to acquired atrophy of thyroid    History of CVA (cerebrovascular accident)    Primary parkinsonism ([address]    Coronary artery disease involving native coronary artery of native heart without angina pectoris    Essential hypertension    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 HENT: Negative. Eyes: Negative. Respiratory: Negative. Cardiovascular: Negative. Gastrointestinal: Negative. Genitourinary:  Positive for frequency (Attributed to Parkisnson's). Musculoskeletal:  Positive for arthralgias (Right shoulder pain). Skin: Negative. Neurological:  Positive for tremors and speech difficulty (Soft voice). Rigidity   Psychiatric/Behavioral: Negative. No Known Allergies  Prior to Visit Medications    Medication Sig Taking?  Authorizing Provider   atorvastatin (LIPITOR) 40 MG tablet TAKE 1 TABLET BY MOUTH EVERY DAY Yes Ghada Jerez MD   levothyroxine (SYNTHROID) 75 MCG tablet TAKE 1 TABLET BY MOUTH EVERY DAY Yes Ghada Jerez MD   NATEGLINIDE PO Take by mouth 3 times daily (before meals) Yes Historical Provider, MD   Cyanocobalamin (VITAMIN B-12) 1000 MCG SUBL Place 1 tablet under the tongue daily Yes Ghada Jerez MD   pioglitazone (ACTOS) 15 MG tablet Take 15 mg by mouth daily Yes Historical Provider, MD   carbidopa-levodopa (SINEMET)  MG per tablet Take 2.5 tablets by mouth 3 times daily  Yes Historical Provider, MD   aspirin 81 MG EC tablet Take 81 mg by mouth nightly Yes Historical Provider, MD   metFORMIN (GLUCOPHAGE-XR) 500 MG extended release tablet Take 1,000 mg by mouth 2 times daily  Yes Historical Provider, MD   levothyroxine (SYNTHROID) 75 MCG tablet TAKE 1 TABLET BY MOUTH EVERY DAY  Ghada Jerez MD        Past Medical History:   Diagnosis Date    CAD (coronary artery disease)     Cerebral artery occlusion with cerebral infarction ([facility] 05/2016    left frontal lobe    Diabetes mellitus, type 2 ([address]     Diverticulitis     Hyperlipidemia     Osteoarthritis     Positive PPD, treated     treated with INH    Prostate cancer ([facility] 4/13    Tyson Dewitt active surveillance     Past Surgical History:   Procedure Laterality Date    APPENDECTOMY  1969    CORONARY ARTERY BYPASS GRAFT  09/13/2016    3V    EYE SURGERY Left 2004    cataract surgery-right    HERNIA REPAIR Bilateral     right ing, left ing    KNEE ARTHROPLASTY Left 01/08/2014    Left total knee replacement    PROSTATE BIOPSY  03/02/2022    ROTATOR CUFF REPAIR  1995, 2007    right, left    TONSILLECTOMY  1958     Family History   Problem Relation Age of Onset    COPD Father     Diabetes Father     Heart Attack Father     Cancer Father 70        Prostate    Heart Disease Father     Diabetes Brother      Social History     Socioeconomic History    Marital status:      Spouse name: Not on file    Number of children: Not on file    Years of education: Not on file    Highest education level: Not on file   Occupational History    Not on file   Tobacco Use    Smoking status: Never    Smokeless tobacco: Never   Substance and Sexual Activity    Alcohol use: No    Drug use: No    Sexual activity: Yes     Partners: Female   Other Topics Concern    Not on file   Social History Narrative    Not on file     Social Determinants of Health     Financial Resource Strain: Low Risk     Difficulty of Paying Living Expenses: Not hard at all   Food Insecurity: No Food Insecurity    Worried About [address] [employer] in the Last Year: Never true    [address] [employer] in the Last Year: Never true   Transportation Needs: Unknown    Lack of Transportation (Medical): Not on file    Lack of Transportation (Non-Medical): No   Physical Activity: Insufficiently Active    Days of Exercise per Week: 4 days    Minutes of Exercise per Session: 20 min   Stress: Not on file   Social Connections: Not on file   Intimate Partner Violence: Not At Risk    Fear of Current or Ex-Partner: No    Emotionally Abused: No    Physically Abused: No    Sexually Abused: No   Housing Stability: Unknown    Unable to Pay for Housing in the Last Year: Not on file    Number of Places Lived in the Last Year: Not on file    Unstable Housing in the Last Year: No       Vitals:    02/03/23 0841 02/03/23 0939   BP: (!) 156/70 122/74   Site: Right Upper Arm Left Upper Arm   Position: Sitting    Cuff Size: Large Adult    Pulse: 58    SpO2: 96%    Weight: 191 lb (86.6 kg)       Estimated body mass index is 29.04 kg/m² as calculated from the following:    Height as of 12/13/22: 5' 8\" (1.727 m). Weight as of this encounter: 191 lb (86.6 kg).      Wt Readings from Last 2 Encounters:   02/03/23 191 lb (86.6 kg) 12/13/22 189 lb 14.4 oz (86.1 kg)     BP Readings from Last 3 Encounters:   02/03/23 122/74   12/13/22 126/66   05/06/22 122/82      Physical Exam  Constitutional:       General: He is not in acute distress. Appearance: He is well-developed. HENT:      Head: Normocephalic and atraumatic. Mouth/Throat:      Mouth: Mucous membranes are moist.      Pharynx: Oropharynx is clear. Uvula midline. Eyes:      Extraocular Movements: Extraocular movements intact. Conjunctiva/sclera: Conjunctivae normal.      Pupils: Pupils are equal, round, and reactive to light. Neck:      Thyroid: No thyroid mass or thyromegaly. Vascular: No carotid bruit or JVD. Trachea: Trachea normal.   Cardiovascular:      Rate and Rhythm: Normal rate and regular rhythm. Pulses: Normal pulses. Heart sounds: Normal heart sounds. No murmur heard. No friction rub. No gallop. Pulmonary:      Effort: Pulmonary effort is normal. No respiratory distress. Breath sounds: Normal breath sounds. No wheezing or rales. Abdominal:      General: Bowel sounds are normal. There is no distension. Palpations: Abdomen is soft. There is no mass. Tenderness: There is no abdominal tenderness. Musculoskeletal:         General: No tenderness. Cervical back: Normal range of motion and neck supple. Right lower leg: No edema. Left lower leg: No edema. Comments: Shoulder exam per ortho   Skin:     General: Skin is warm and dry. Findings: No erythema or rash. Neurological:      Mental Status: He is alert and oriented to person, place, and time. Cranial Nerves: No cranial nerve deficit. Sensory: No sensory deficit. Coordination: Coordination normal.      Comments: Coarse resting tremor left hand. Soft voice. Diffuse dyskinesias.   Detailed neuro exam deferred to neurologist.    Psychiatric:         Mood and Affect: Mood normal.         Behavior: Behavior normal.         Thought Content: Thought content normal.         Cognition and Memory: Cognition normal.         Judgment: Judgment normal.     EKG: Sinus rhythm with RBBB- unchanged from prior tracing. Lab Results   Component Value Date    CHOLFAST 109 04/08/2021    TRIGLYCFAST 55 04/08/2021    HDL 58 04/08/2021    LDLCALC 40 04/08/2021     Lab Results   Component Value Date    GLUF 153 (H) 04/08/2021    LABA1C 7.2 04/29/2021     Lab Results   Component Value Date     01/14/2022    K 5.2 (H) 01/14/2022    BUN 26 (H) 01/14/2022    CREATININE 1.1 01/14/2022    LABGLOM >60 01/14/2022    GFRAA >60 01/14/2022    CALCIUM 10.3 01/14/2022     Lab Results   Component Value Date    ALT <5 (L) 11/17/2021    AST 11 (L) 11/17/2021     Lab Results   Component Value Date    HGB 13.2 (L) 11/17/2021     Lab Results   Component Value Date    TSHREFLEX 10.85 (H) 05/17/2016    TSH 2.71 10/31/2019         Assessment/Plan:     Osteoarthritis of right glenohumeral joint  Emergent procedure NO  Active Cardiac Condition (decompensated HF, Arrhythmia, MI <3 weeks, severe valve disease):  NO   Risk Level of Procedure Intermediate Risk (intraperitoneal, intrathoracic, HENT, orthopedic, or carotid endarterectomy, etc.)  Revised Cardiac Risk Index Risk factors: History of ischemic heart disease  History of cerebrovascular disease    According to the 2014 ACC/AHA pre-operative risk assessment guidelines Gema Velasco is a low risk for major cardiac complications during a intermediate risk procedure and may continue as planned.     Preoperative workup as follows: ECG  Deep vein thrombosis prophylaxis: regimen to be chosen by surgical team    Further recommendations requested from consultants: Yes - cardiology for ASA recommendation  Type 2 diabetes mellitus with stage 3a chronic kidney disease, without long-term current use of insulin (HCC)  Assessment & Plan:  Last HgbA1c 6.4, so should not increase risk of perioperative infections or interfere with wound healing. Kidney function stable, but NSAIDs contraindicated for post-operative pain control. He was advised to hold metformin 24 prior to surgery and hold Actos and nateglinide on the morning of surgery. Coronary artery disease involving native coronary artery of native heart without angina pectoris  Assessment & Plan:  Asymptomatic on current medical regimen- continue, but will need clearance from cardiologist to hold ASA. Orders:  -     EKG 12 Lead  Essential hypertension  Assessment & Plan:  Well-controlled on lower sodium diet. Should avoid BP readings on the right arm due to deformity. Primary parkinsonism ([address]  Assessment & Plan:  Stable. Take usual dose of Sinemet perioperatively. Hypothyroidism due to acquired atrophy of thyroid  Assessment & Plan:  Euthyroid per recent TSH. Take Synthroid on morning of surgery with a sip of water. --Shelbi Young MD on 2/3/2023 at 9:40 AM    An electronic signature was used to authenticate this note.

## 2023-02-01 NOTE — ASSESSMENT & PLAN NOTE
Last HgbA1c 6.4, so should not increase risk of perioperative infections or interfere with wound healing. Kidney function stable, but NSAIDs contraindicated for post-operative pain control. He was advised to hold metformin 24 prior to surgery and hold Actos and nateglinide on the morning of surgery.

## 2023-02-03 ENCOUNTER — OFFICE VISIT (OUTPATIENT)
Dept: INTERNAL MEDICINE CLINIC | Age: 78
End: 2023-02-03

## 2023-02-03 VITALS
HEART RATE: 58 BPM | DIASTOLIC BLOOD PRESSURE: 74 MMHG | SYSTOLIC BLOOD PRESSURE: 122 MMHG | OXYGEN SATURATION: 96 % | BODY MASS INDEX: 29.04 KG/M2 | WEIGHT: 191 LBS

## 2023-02-03 DIAGNOSIS — N18.31 TYPE 2 DIABETES MELLITUS WITH STAGE 3A CHRONIC KIDNEY DISEASE, WITHOUT LONG-TERM CURRENT USE OF INSULIN (HCC): ICD-10-CM

## 2023-02-03 DIAGNOSIS — I10 ESSENTIAL HYPERTENSION: ICD-10-CM

## 2023-02-03 DIAGNOSIS — M19.011 OSTEOARTHRITIS OF RIGHT GLENOHUMERAL JOINT: Primary | ICD-10-CM

## 2023-02-03 DIAGNOSIS — I25.10 CORONARY ARTERY DISEASE INVOLVING NATIVE CORONARY ARTERY OF NATIVE HEART WITHOUT ANGINA PECTORIS: ICD-10-CM

## 2023-02-03 DIAGNOSIS — E11.22 TYPE 2 DIABETES MELLITUS WITH STAGE 3A CHRONIC KIDNEY DISEASE, WITHOUT LONG-TERM CURRENT USE OF INSULIN (HCC): ICD-10-CM

## 2023-02-03 DIAGNOSIS — G20 PRIMARY PARKINSONISM (HCC): ICD-10-CM

## 2023-02-03 DIAGNOSIS — E03.4 HYPOTHYROIDISM DUE TO ACQUIRED ATROPHY OF THYROID: ICD-10-CM

## 2023-02-03 SDOH — ECONOMIC STABILITY: INCOME INSECURITY: HOW HARD IS IT FOR YOU TO PAY FOR THE VERY BASICS LIKE FOOD, HOUSING, MEDICAL CARE, AND HEATING?: NOT HARD AT ALL

## 2023-02-03 SDOH — ECONOMIC STABILITY: FOOD INSECURITY: WITHIN THE PAST 12 MONTHS, THE FOOD YOU BOUGHT JUST DIDN'T LAST AND YOU DIDN'T HAVE MONEY TO GET MORE.: NEVER TRUE

## 2023-02-03 SDOH — ECONOMIC STABILITY: FOOD INSECURITY: WITHIN THE PAST 12 MONTHS, YOU WORRIED THAT YOUR FOOD WOULD RUN OUT BEFORE YOU GOT MONEY TO BUY MORE.: NEVER TRUE

## 2023-02-03 ASSESSMENT — PATIENT HEALTH QUESTIONNAIRE - PHQ9
2. FEELING DOWN, DEPRESSED OR HOPELESS: 1
SUM OF ALL RESPONSES TO PHQ QUESTIONS 1-9: 2
SUM OF ALL RESPONSES TO PHQ QUESTIONS 1-9: 2
SUM OF ALL RESPONSES TO PHQ9 QUESTIONS 1 & 2: 2
SUM OF ALL RESPONSES TO PHQ QUESTIONS 1-9: 2
SUM OF ALL RESPONSES TO PHQ QUESTIONS 1-9: 2
1. LITTLE INTEREST OR PLEASURE IN DOING THINGS: 1

## 2023-02-03 ASSESSMENT — ENCOUNTER SYMPTOMS
RESPIRATORY NEGATIVE: 1
EYES NEGATIVE: 1
GASTROINTESTINAL NEGATIVE: 1

## 2023-02-03 NOTE — ASSESSMENT & PLAN NOTE
Asymptomatic on current medical regimen- continue, but will need clearance from cardiologist to hold ASA.

## 2023-02-06 ENCOUNTER — TELEPHONE (OUTPATIENT)
Dept: INTERNAL MEDICINE CLINIC | Age: 78
End: 2023-02-06

## 2023-02-06 NOTE — TELEPHONE ENCOUNTER
Sully Alcocer from Dr. Giuseppe Spann office, patient's cardiologist, is requesting patient's pre-op notes as well as EKG tracing be faxed to their office. Please fax to F#: [phone]. Please contact their office with any additional questions.

## 2023-05-01 ENCOUNTER — TELEPHONE (OUTPATIENT)
Dept: PHARMACY | Facility: CLINIC | Age: 78
End: 2023-05-01

## 2023-05-01 NOTE — TELEPHONE ENCOUNTER
Pharmacy: 1  Intervention Detail: Adherence Monitorin  Intervention Accepted By: Pharmacy: 1  Gap Closed?: No   Time Spent (min): Sondra Singletary [place].    [facility] free: [phone]

## 2023-05-10 ENCOUNTER — TELEPHONE (OUTPATIENT)
Dept: PHARMACY | Facility: CLINIC | Age: 78
End: 2023-05-10

## 2023-05-10 NOTE — TELEPHONE ENCOUNTER
following are interventions that have been identified:   Patient overdue refilling atorvastatin 40mg daily (April refill was returned to stock?) and active on home medication list.   Need to confirm if patient is taking metformin ER 500mg, 2 tabs BID? (Rx from external endo, April refill was returned to stock?)    Attempting to reach patient to review. Left message asking for return call. [employer] message sent to patient.     Last Visit: 2/3/23  Next Visit: 6/16/23    Nicole KatzD, [facility]  Department, toll free: [phone], option 1    =======================================================   For Pharmacy Admin Tracking Only    Program: [address] in place:  No  Gap Closed?: No   Time Spent (min): 15

## 2023-06-14 DIAGNOSIS — E78.00 PURE HYPERCHOLESTEROLEMIA: ICD-10-CM

## 2023-06-14 DIAGNOSIS — I10 ESSENTIAL HYPERTENSION: ICD-10-CM

## 2023-06-15 LAB
ALBUMIN SERPL-MCNC: 4.6 G/DL (ref 3.4–5)
ALBUMIN/GLOB SERPL: 1.9 {RATIO} (ref 1.1–2.2)
ALP SERPL-CCNC: 101 U/L (ref 40–129)
ALT SERPL-CCNC: 8 U/L (ref 10–40)
ANION GAP SERPL CALCULATED.3IONS-SCNC: 13 MMOL/L (ref 3–16)
AST SERPL-CCNC: 12 U/L (ref 15–37)
BILIRUB SERPL-MCNC: 0.4 MG/DL (ref 0–1)
BUN SERPL-MCNC: 24 MG/DL (ref 7–20)
CALCIUM SERPL-MCNC: 9.9 MG/DL (ref 8.3–10.6)
CHLORIDE SERPL-SCNC: 100 MMOL/L (ref 99–110)
CHOLEST SERPL-MCNC: 112 MG/DL (ref 0–199)
CO2 SERPL-SCNC: 25 MMOL/L (ref 21–32)
CREAT SERPL-MCNC: 1.4 MG/DL (ref 0.8–1.3)
GFR SERPLBLD CREATININE-BSD FMLA CKD-EPI: 51 ML/MIN/{1.73_M2}
GLUCOSE P FAST SERPL-MCNC: 126 MG/DL (ref 70–99)
HDLC SERPL-MCNC: 57 MG/DL (ref 40–60)
LDL CHOLESTEROL CALCULATED: 41 MG/DL
POTASSIUM SERPL-SCNC: 4.3 MMOL/L (ref 3.5–5.1)
PROT SERPL-MCNC: 7 G/DL (ref 6.4–8.2)
SODIUM SERPL-SCNC: 138 MMOL/L (ref 136–145)
TRIGL SERPL-MCNC: 71 MG/DL (ref 0–150)
TSH SERPL DL<=0.005 MIU/L-ACNC: 1.82 UIU/ML (ref 0.27–4.2)
VLDLC SERPL CALC-MCNC: 14 MG/DL

## 2023-06-16 PROBLEM — F32.1 CURRENT MODERATE EPISODE OF MAJOR DEPRESSIVE DISORDER WITHOUT PRIOR EPISODE (HCC): Status: ACTIVE | Noted: 2023-06-16

## 2023-06-16 PROBLEM — Z91.81 AT HIGH RISK FOR FALLS: Status: ACTIVE | Noted: 2023-06-16

## 2023-07-10 LAB — PSA, TOTAL: 3.12 NG/ML (ref 0–4)

## 2023-08-04 RX ORDER — ATORVASTATIN CALCIUM 40 MG/1
TABLET, FILM COATED ORAL
Qty: 90 TABLET | Refills: 1 | Status: SHIPPED | OUTPATIENT
Start: 2023-08-04

## 2023-08-14 RX ORDER — LEVOTHYROXINE SODIUM 0.07 MG/1
TABLET ORAL
Qty: 90 TABLET | Refills: 1 | Status: SHIPPED | OUTPATIENT
Start: 2023-08-14

## 2023-09-19 ENCOUNTER — PATIENT MESSAGE (OUTPATIENT)
Dept: INTERNAL MEDICINE CLINIC | Age: 78
End: 2023-09-19

## 2023-09-19 DIAGNOSIS — G20 PRIMARY PARKINSONISM (HCC): Primary | ICD-10-CM

## 2023-09-20 NOTE — TELEPHONE ENCOUNTER
From: Llu Gonzalez  To: Dr. Kalani Rosario  Sent: 9/19/2023 11:21 PM EDT  Subject: Sleep study    Attached is sleep a study from [facility] I am asking you who I should go to.  Thx.

## 2023-10-25 ENCOUNTER — OFFICE VISIT (OUTPATIENT)
Dept: FAMILY MEDICINE CLINIC | Age: 78
End: 2023-10-25

## 2023-10-25 VITALS
TEMPERATURE: 97.6 F | DIASTOLIC BLOOD PRESSURE: 76 MMHG | BODY MASS INDEX: 28.64 KG/M2 | HEART RATE: 79 BPM | WEIGHT: 184.2 LBS | SYSTOLIC BLOOD PRESSURE: 152 MMHG | RESPIRATION RATE: 14 BRPM | OXYGEN SATURATION: 98 %
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
## 2023-10-25 RX ORDER — TRIAMCINOLONE ACETONIDE 1 MG/G
OINTMENT TOPICAL 2 TIMES DAILY
Qty: 30 G | Refills: 0 | Status: SHIPPED | OUTPATIENT
Start: 2023-10-25 | End: 2023-11-01

## 2023-10-25 ASSESSMENT — ENCOUNTER SYMPTOMS
ABDOMINAL PAIN: 0
EYE REDNESS: 0
NAUSEA: 0
BACK PAIN: 0
CONSTIPATION: 0
CHEST TIGHTNESS: 0
WHEEZING: 0
SINUS PRESSURE: 0
SHORTNESS OF BREATH: 0
DIARRHEA: 0
RHINORRHEA: 0
COLOR CHANGE: 0
SORE THROAT: 0
EYE ITCHING: 0
VOMITING: 0
BLOOD IN STOOL: 0
COUGH: 0
ROS SKIN COMMENTS: R SIDE OF FACE

## 2023-10-25 NOTE — PROGRESS NOTES
Zak Crooks (:  1945) is a 66 y.o. male,, here for evaluation of the following chief complaint(s):Established patient  Skin Problem (Red spot with little swollen, ongoing for 3 days )      ASSESSMENT/PLAN:  1. Bug bite, initial encounter  Assessment & Plan:  Apply Kenalog cream directly to bites to aid in resolution. F/u if not resolved in 1 week. No follow-ups on file. SUBJECTIVE/OBJECTIVE:    Pt presents to office with CC skin irritation to the right side of the face. Pt reports development of 5 spots over the last 3 days. Pt reports spots are not painful or itchy. Pt denies use of OTC medication to aid in resolution of skin irritation. Denies fever, SOB, and CP. No additional concerns. Current Outpatient Medications   Medication Sig Dispense Refill    triamcinolone (KENALOG) 0.1 % ointment Apply topically 2 times daily for 7 days 30 g 0    levothyroxine (SYNTHROID) 75 MCG tablet TAKE 1 TABLET BY MOUTH EVERY DAY 90 tablet 1    atorvastatin (LIPITOR) 40 MG tablet TAKE 1 TABLET BY MOUTH EVERY DAY 90 tablet 1    vitamin B-12 (CYANOCOBALAMIN) 500 MCG tablet Take 1 tablet by mouth daily      NATEGLINIDE PO Take by mouth 3 times daily (before meals)      pioglitazone (ACTOS) 15 MG tablet Take 1 tablet by mouth daily      carbidopa-levodopa (SINEMET)  MG per tablet Take 2.5 tablets by mouth 3 times daily      aspirin 81 MG EC tablet Take 1 tablet by mouth nightly      metFORMIN (GLUCOPHAGE-XR) 500 MG extended release tablet Take 2 tablets by mouth 2 times daily       No current facility-administered medications for this visit. Review of Systems   Constitutional:  Negative for chills, fatigue and fever. HENT:  Negative for congestion, ear pain, postnasal drip, rhinorrhea, sinus pressure, sneezing and sore throat. Eyes:  Negative for redness and itching. Respiratory:  Negative for cough, chest tightness, shortness of breath and wheezing.     Cardiovascular:  Negative for

## 2023-11-01 RX ORDER — ATORVASTATIN CALCIUM 40 MG/1
TABLET, FILM COATED ORAL
Qty: 90 TABLET | Refills: 1 | Status: SHIPPED | OUTPATIENT
Start: 2023-11-01
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
## 2023-12-07 NOTE — ASSESSMENT & PLAN NOTE
At goal on 1000 mcg of SL B12/day per recent labs at [facility].

## 2023-12-07 NOTE — ASSESSMENT & PLAN NOTE
At goal per recent HgbA1c per endocrinology. Continue current diabetic medications except for Actos, which was recently discontinued per cardiology recommendation. If home BS readings increase significantly, he will contact endocrinologist for HgbA1c in 3 months instead of 6. GFR worse, but BUN significantly elevated and urine microalbumin/creatinine normal, so may be prerenal. No diuretics currently- he will try to increase his fluid intake. Repeat renal function labs in 2 weeks- if no improvement, will refer to nephrology.

## 2023-12-07 NOTE — ASSESSMENT & PLAN NOTE
Euthyroid per recent TSH. Suspect fatigue is multifactorial- contributing factors likely include Parkinson's, depression (declines treatment) and FRENCH. ..

## 2023-12-12 ENCOUNTER — OFFICE VISIT (OUTPATIENT)
Dept: INTERNAL MEDICINE CLINIC | Age: 78
End: 2023-12-12
Payer: MEDICARE

## 2023-12-12 VITALS
BODY MASS INDEX: 28.7 KG/M2 | SYSTOLIC BLOOD PRESSURE: 122 MMHG | HEART RATE: 56 BPM | WEIGHT: 184.6 LBS | OXYGEN SATURATION: 98 % | DIASTOLIC BLOOD PRESSURE: 62 MMHG

## 2023-12-12 DIAGNOSIS — I10 ESSENTIAL HYPERTENSION: ICD-10-CM

## 2023-12-12 DIAGNOSIS — E03.4 HYPOTHYROIDISM DUE TO ACQUIRED ATROPHY OF THYROID: ICD-10-CM

## 2023-12-12 DIAGNOSIS — N18.32 TYPE 2 DIABETES MELLITUS WITH STAGE 3B CHRONIC KIDNEY DISEASE, WITHOUT LONG-TERM CURRENT USE OF INSULIN (HCC): ICD-10-CM

## 2023-12-12 DIAGNOSIS — N18.32 TYPE 2 DIABETES MELLITUS WITH STAGE 3B CHRONIC KIDNEY DISEASE, WITHOUT LONG-TERM CURRENT USE OF INSULIN (HCC): Primary | ICD-10-CM

## 2023-12-12 DIAGNOSIS — E11.22 TYPE 2 DIABETES MELLITUS WITH STAGE 3B CHRONIC KIDNEY DISEASE, WITHOUT LONG-TERM CURRENT USE OF INSULIN (HCC): Primary | ICD-10-CM

## 2023-12-12 DIAGNOSIS — E53.8 VITAMIN B12 DEFICIENCY: ICD-10-CM

## 2023-12-12 DIAGNOSIS — G20.C PRIMARY PARKINSONISM: ICD-10-CM

## 2023-12-12 DIAGNOSIS — E78.00 PURE HYPERCHOLESTEROLEMIA: ICD-10-CM

## 2023-12-12 DIAGNOSIS — E11.22 TYPE 2 DIABETES MELLITUS WITH STAGE 3B CHRONIC KIDNEY DISEASE, WITHOUT LONG-TERM CURRENT USE OF INSULIN (HCC): ICD-10-CM

## 2023-12-12 LAB
ANION GAP SERPL CALCULATED.3IONS-SCNC: 9 MMOL/L (ref 3–16)
BUN SERPL-MCNC: 40 MG/DL (ref 7–20)
CALCIUM SERPL-MCNC: 9.4 MG/DL (ref 8.3–10.6)
CHLORIDE SERPL-SCNC: 106 MMOL/L (ref 99–110)
CO2 SERPL-SCNC: 26 MMOL/L (ref 21–32)
CREAT SERPL-MCNC: 1.3 MG/DL (ref 0.8–1.3)
GFR SERPLBLD CREATININE-BSD FMLA CKD-EPI: 56 ML/MIN/{1.73_M2}
GLUCOSE SERPL-MCNC: 125 MG/DL (ref 70–99)
POTASSIUM SERPL-SCNC: 5 MMOL/L (ref 3.5–5.1)
SODIUM SERPL-SCNC: 141 MMOL/L (ref 136–145)

## 2023-12-12 PROCEDURE — 1123F ACP DISCUSS/DSCN MKR DOCD: CPT | Performed by: INTERNAL MEDICINE

## 2023-12-12 PROCEDURE — 3074F SYST BP LT 130 MM HG: CPT | Performed by: INTERNAL MEDICINE

## 2023-12-12 PROCEDURE — 99214 OFFICE O/P EST MOD 30 MIN: CPT | Performed by: INTERNAL MEDICINE

## 2023-12-12 PROCEDURE — 3078F DIAST BP <80 MM HG: CPT | Performed by: INTERNAL MEDICINE

## 2023-12-12 NOTE — PATIENT INSTRUCTIONS
[address] [address]  [place] [facility], [facility]  Phone: [phone]

## 2023-12-12 NOTE — ASSESSMENT & PLAN NOTE
Well-controlled. Continue current antihypertensive regimen. Yes - the patient is able to be screened Treat and Release

## 2023-12-22 ENCOUNTER — TELEPHONE (OUTPATIENT)
Dept: INTERNAL MEDICINE CLINIC | Age: 78
End: 2023-12-22

## 2023-12-22 NOTE — TELEPHONE ENCOUNTER
----- Message from Annette Naranjo sent at 12/22/2023 10:03 AM EST -----  Subject: Message to Provider    QUESTIONS  Information for Provider? Yoli patient's wife wants to let Dr. Najera know that patient is in the ER for the 3rd day in a row. Please check the my-chart per his wife Yoli.   ---------------------------------------------------------------------------  --------------  CALL BACK INFO  [phone]; OK to leave message on voicemail  ---------------------------------------------------------------------------  --------------  SCRIPT ANSWERS  Relationship to Patient? Spouse/Partner  Representative Name? Yoli   Is the representative on the Communication Release of Information (JAZLYN)   form in Epic? Yes

## 2023-12-22 NOTE — TELEPHONE ENCOUNTER
ER visits reviewed in Care Everywhere. Needs follow up with cardiology for the near syncope and with me for the abdominal pain. I currently have nothing available in person until 1/9, but could do a video visit sooner on a Thursday.

## 2024-01-05 ENCOUNTER — TELEPHONE (OUTPATIENT)
Dept: INTERNAL MEDICINE CLINIC | Age: 79
End: 2024-01-05

## 2024-01-05 NOTE — TELEPHONE ENCOUNTER
Care Transitions Initial Follow Up Call    Outreach made within 2 business days of discharge: Yes    Patient: Yannick Ortiz Patient : 1945   MRN: [id number]  Reason for Admission: There are no discharge diagnoses documented for the most recent discharge.  Discharge Date: 21       Spoke with: Yoli Hutton      Discharge department/facility: [facility] [place] Patient Contact:  Was patient able to fill all prescriptions: Yes  Was patient instructed to bring all medications to the follow-up visit: Yes  Is patient taking all medications as directed in the discharge summary? Yes  Does patient understand their discharge instructions: Yes  Does patient have questions or concerns that need addressed prior to 7-14 day follow up office visit: no    Scheduled appointment with PCP within 7-14 days    Follow Up  Future Appointments   Date Time Provider Department Center   2024 10:30 AM Marcia Najera MD [facility] [place] [facility]   2024  2:00 PM Marcia Najera MD [facility] [place] [facility] Eleane Meert, MA

## 2024-01-08 ENCOUNTER — TELEPHONE (OUTPATIENT)
Dept: INTERNAL MEDICINE CLINIC | Age: 79
End: 2024-01-08

## 2024-01-08 NOTE — TELEPHONE ENCOUNTER
Patient's wife Yoli is calling in for  in regards to his appointment coming up for hospital f/u. Per Yoli patient is needing to get lab work per hospital and is wanting to know if  can order the lab work and send it to:     [facility]  lab    [address],   [place], [place], [address]    Please advise.

## 2024-01-08 NOTE — TELEPHONE ENCOUNTER
I reviewed the discharge summary and did not see any recommendations regarding follow up labs. Since he was hospitalized for a GI bleed, I would assume that a CBC would be appropriate. Please call wife, Yoli, to ask if they gave her any other instructions that might not appear in the discharge summary.

## 2024-01-08 NOTE — TELEPHONE ENCOUNTER
Yoli states they were just advised to follow up with pcp and have blood work done to make sure his numbers are still ok. She will call the head nurse at the hospital to see what specific labs they were recommending. She will call us back

## 2024-01-11 PROBLEM — K92.2 UPPER GI BLEED: Status: ACTIVE | Noted: 2024-01-11

## 2024-01-11 NOTE — PROGRESS NOTES
Post-Discharge Transitional Care Follow Up    Yannick Ortiz   YOB: 1945    Date of Office Visit:  1/12/2024  Date of Hospital Admission: 12/24/23  Date of Hospital Discharge: 1/4/23    Care management risk score Rising risk (score 2-5) and Complex Care (Scores >=6): No Risk Score On File     Non face to face  following discharge, date last encounter closed (first attempt may have been earlier): 01/05/2024     Call initiated 2 business days of discharge: Yes    ASSESSMENT/PLAN:   Hospital discharge follow-up  -     MI DISCHARGE MEDS RECONCILED W/ CURRENT OUTPATIENT MED LIST  Upper GI bleed  Assessment & Plan:  Likely triggered by daily, low dose ASA. Clinically stable on bid PPI, which he will continue for 8 weeks, then decrease to daily dosing, which he will likely require indefinitely, as long needs daily ASA for CV prevention. Continue qid Carafate for 4 weeks. Follow up with GI, as directed.   Orders:  -     CBC with Auto Differential; Future  Dizziness  Assessment & Plan:  No further pre-syncopal or syncopal episodes with treatment of ulcer and anemia. Patient will call if symptoms recur.   Type 2 diabetes mellitus with stage 3b chronic kidney disease, without long-term current use of insulin ([facility])  Assessment & Plan:  Blood sugar readings in the hospital erratic, but starting to normalize at home. In light of recent HgbA1c of 6.2, will continue current regimen. Follow up, as directed, with endocrinology. Renal function has been stable, but will need to watch closely on high dose PPI.   Orders:  -     Basic Metabolic Panel; Future  Coronary artery disease involving native coronary artery of native heart without angina pectoris  Assessment & Plan:  Asymptomatic on current medical regimen- continue. Continue regular follow up with cardiology.  Essential hypertension  Assessment & Plan:  Well-controlled off BP medication. Will continue to monitor.       Medical Decision Making: high

## 2024-01-12 ENCOUNTER — OFFICE VISIT (OUTPATIENT)
Dept: INTERNAL MEDICINE CLINIC | Age: 79
End: 2024-01-12

## 2024-01-12 VITALS
OXYGEN SATURATION: 98 % | BODY MASS INDEX: 28.54 KG/M2 | DIASTOLIC BLOOD PRESSURE: 60 MMHG | WEIGHT: 183.6 LBS | HEART RATE: 62 BPM | SYSTOLIC BLOOD PRESSURE: 102 MMHG

## 2024-01-12 DIAGNOSIS — K92.2 UPPER GI BLEED: ICD-10-CM

## 2024-01-12 DIAGNOSIS — E11.22 TYPE 2 DIABETES MELLITUS WITH STAGE 3B CHRONIC KIDNEY DISEASE, WITHOUT LONG-TERM CURRENT USE OF INSULIN (HCC): ICD-10-CM

## 2024-01-12 DIAGNOSIS — N18.32 TYPE 2 DIABETES MELLITUS WITH STAGE 3B CHRONIC KIDNEY DISEASE, WITHOUT LONG-TERM CURRENT USE OF INSULIN (HCC): ICD-10-CM

## 2024-01-12 DIAGNOSIS — I25.10 CORONARY ARTERY DISEASE INVOLVING NATIVE CORONARY ARTERY OF NATIVE HEART WITHOUT ANGINA PECTORIS: ICD-10-CM

## 2024-01-12 DIAGNOSIS — I10 ESSENTIAL HYPERTENSION: ICD-10-CM

## 2024-01-12 DIAGNOSIS — R42 DIZZINESS: ICD-10-CM

## 2024-01-12 DIAGNOSIS — Z09 HOSPITAL DISCHARGE FOLLOW-UP: Primary | ICD-10-CM

## 2024-01-12 RX ORDER — HYDROXYZINE PAMOATE 25 MG/1
25 CAPSULE ORAL NIGHTLY
COMMUNITY
Start: 2023-12-22 | End: 2024-01-12

## 2024-01-12 RX ORDER — PANTOPRAZOLE SODIUM 40 MG/1
40 TABLET, DELAYED RELEASE ORAL 2 TIMES DAILY
COMMUNITY
Start: 2024-01-05 | End: 2024-03-01

## 2024-01-12 ASSESSMENT — PATIENT HEALTH QUESTIONNAIRE - PHQ9
10. IF YOU CHECKED OFF ANY PROBLEMS, HOW DIFFICULT HAVE THESE PROBLEMS MADE IT FOR YOU TO DO YOUR WORK, TAKE CARE OF THINGS AT HOME, OR GET ALONG WITH OTHER PEOPLE: 0
SUM OF ALL RESPONSES TO PHQ9 QUESTIONS 1 & 2: 0
4. FEELING TIRED OR HAVING LITTLE ENERGY: 1
1. LITTLE INTEREST OR PLEASURE IN DOING THINGS: 0
3. TROUBLE FALLING OR STAYING ASLEEP: 1
8. MOVING OR SPEAKING SO SLOWLY THAT OTHER PEOPLE COULD HAVE NOTICED. OR THE OPPOSITE, BEING SO FIGETY OR RESTLESS THAT YOU HAVE BEEN MOVING AROUND A LOT MORE THAN USUAL: 0
SUM OF ALL RESPONSES TO PHQ QUESTIONS 1-9: 3
2. FEELING DOWN, DEPRESSED OR HOPELESS: 0
SUM OF ALL RESPONSES TO PHQ QUESTIONS 1-9: 3
SUM OF ALL RESPONSES TO PHQ QUESTIONS 1-9: 3
5. POOR APPETITE OR OVEREATING: 0
9. THOUGHTS THAT YOU WOULD BE BETTER OFF DEAD, OR OF HURTING YOURSELF: 0
6. FEELING BAD ABOUT YOURSELF - OR THAT YOU ARE A FAILURE OR HAVE LET YOURSELF OR YOUR FAMILY DOWN: 0
7. TROUBLE CONCENTRATING ON THINGS, SUCH AS READING THE NEWSPAPER OR WATCHING TELEVISION: 1
SUM OF ALL RESPONSES TO PHQ QUESTIONS 1-9: 3

## 2024-01-12 NOTE — ASSESSMENT & PLAN NOTE
Blood sugar readings in the hospital erratic, but starting to normalize at home. In light of recent HgbA1c of 6.2, will continue current regimen. Follow up, as directed, with endocrinology. Renal function has been stable, but will need to watch closely on high dose PPI.

## 2024-01-12 NOTE — ASSESSMENT & PLAN NOTE
Likely triggered by daily, low dose ASA. Clinically stable on bid PPI, which he will continue for 8 weeks, then decrease to daily dosing, which he will likely require indefinitely, as long needs daily ASA for CV prevention. Continue qid Carafate for 4 weeks. Follow up with GI, as directed.

## 2024-01-12 NOTE — ASSESSMENT & PLAN NOTE
No further pre-syncopal or syncopal episodes with treatment of ulcer and anemia. Patient will call if symptoms recur.

## 2024-01-13 LAB
ANION GAP SERPL CALCULATED.3IONS-SCNC: 7 MMOL/L (ref 3–16)
BASOPHILS # BLD: 0.1 K/UL (ref 0–0.2)
BASOPHILS NFR BLD: 1.4 %
BUN SERPL-MCNC: 24 MG/DL (ref 7–20)
CALCIUM SERPL-MCNC: 9 MG/DL (ref 8.3–10.6)
CHLORIDE SERPL-SCNC: 107 MMOL/L (ref 99–110)
CO2 SERPL-SCNC: 26 MMOL/L (ref 21–32)
CREAT SERPL-MCNC: 1.3 MG/DL (ref 0.8–1.3)
DEPRECATED RDW RBC AUTO: 17.7 % (ref 12.4–15.4)
EOSINOPHIL # BLD: 0.2 K/UL (ref 0–0.6)
EOSINOPHIL NFR BLD: 5.1 %
GFR SERPLBLD CREATININE-BSD FMLA CKD-EPI: 56 ML/MIN/{1.73_M2}
GLUCOSE SERPL-MCNC: 100 MG/DL (ref 70–99)
HCT VFR BLD AUTO: 31.2 % (ref 40.5–52.5)
HGB BLD-MCNC: 10.2 G/DL (ref 13.5–17.5)
LYMPHOCYTES # BLD: 0.9 K/UL (ref 1–5.1)
LYMPHOCYTES NFR BLD: 18.5 %
MCH RBC QN AUTO: 30.9 PG (ref 26–34)
MCHC RBC AUTO-ENTMCNC: 32.8 G/DL (ref 31–36)
MCV RBC AUTO: 94.2 FL (ref 80–100)
MONOCYTES # BLD: 0.4 K/UL (ref 0–1.3)
MONOCYTES NFR BLD: 8.1 %
NEUTROPHILS # BLD: 3.3 K/UL (ref 1.7–7.7)
NEUTROPHILS NFR BLD: 66.9 %
PLATELET # BLD AUTO: 298 K/UL (ref 135–450)
PMV BLD AUTO: 8.2 FL (ref 5–10.5)
POTASSIUM SERPL-SCNC: 5.2 MMOL/L (ref 3.5–5.1)
RBC # BLD AUTO: 3.31 M/UL (ref 4.2–5.9)
SODIUM SERPL-SCNC: 140 MMOL/L (ref 136–145)
WBC # BLD AUTO: 4.9 K/UL (ref 4–11)

## 2024-01-15 PROBLEM — E87.5 HYPERKALEMIA: Status: ACTIVE | Noted: 2024-01-15

## 2024-01-24 ENCOUNTER — TELEPHONE (OUTPATIENT)
Dept: INTERNAL MEDICINE CLINIC | Age: 79
End: 2024-01-24

## 2024-02-06 RX ORDER — LEVOTHYROXINE SODIUM 0.07 MG/1
TABLET ORAL
Qty: 90 TABLET | Refills: 1 | Status: SHIPPED | OUTPATIENT
Start: 2024-02-06

## 2024-03-01 ENCOUNTER — TELEPHONE (OUTPATIENT)
Dept: INTERNAL MEDICINE CLINIC | Age: 79
End: 2024-03-01

## 2024-03-01 NOTE — TELEPHONE ENCOUNTER
Patient's wife Yoli is calling in for  in regards to patient having returning sx of stomach pain on LT side below the ribs as of today. Wife is concerned due to hospital visit in December for bleeding and stated patient no longer has his apendix.    Wife is wanting to know if  can squeeze them in today or what she recommends for them to do?     Please advise.

## 2024-03-04 LAB — DIABETIC RETINOPATHY: NEGATIVE

## 2024-03-07 PROBLEM — D64.9 ANEMIA: Status: ACTIVE | Noted: 2024-03-07

## 2024-03-07 PROBLEM — E87.5 HYPERKALEMIA: Status: RESOLVED | Noted: 2024-01-15 | Resolved: 2024-03-07

## 2024-03-07 PROBLEM — K92.2 UPPER GI BLEED: Status: RESOLVED | Noted: 2024-01-11 | Resolved: 2024-03-07

## 2024-03-07 PROBLEM — S32.001A CLOSED BURST FRACTURE OF LUMBAR VERTEBRA (HCC): Status: ACTIVE | Noted: 2024-03-07

## 2024-03-07 PROBLEM — E86.0 DEHYDRATION: Status: ACTIVE | Noted: 2024-03-07

## 2024-03-07 NOTE — PROGRESS NOTES
ER Follow-up Visit    CC: Yannick Ortiz (: 1945) is a 78 y.o. male, established patient, here for follow-up after visit to [facility] ER on 3/3/24 for LLQ abdominal pain.     ER course: ER notes reviewed- see chart/Care Everywhere. Presented to ED with 2 day history of LLQ pain, which worsened after eating potato chips. Initial lactic acid was elevated 2.28, though with IV fluid this completely normalized. CBC showed a normal white blood cell count and platelets with a mildly low hemoglobin of 12.7, which was improved from 24. Amylase and lipase normal. UA negative. CMP significant for BUN of 39 creatinine of 1.77- received a total of 1.5 L of IV fluid- repeat creatinine 1.7 (baseline 1.3-1.4). CT abdomen/pelvis significant for a chronic L3 burst fracture with a proximally 80% loss of height, diverticulosis but no evidence of diverticulitis and mild moderate stool burden. EKG showed no acute changes and cardiac enzymes normal. He had been admitted for 12 day stay in December for a bleeding duodenal ulcer that required several units of PRBCs. He was discharge with instructions to increase fluid and fiber intake and add Miralax, if needed.    Current status: Abdominal pain- improved, intermittent. Fiber and fluid intake- inadequate. Constipation- BM every 2-3 days pm 1 Senokot/day, not using Miralax currently. EGD scheduled- 3/8, Dr. Ruvalcaba- ulcers well-healed. Fell 3/9 taking socks off- hit left forearm and lower leg on the wall, with extensive skin tears. Seen at [facility] ER the same day. X-rays of cervical spine and left humerus, forearm and hand negative for fracture. Head CT also negative. Wounds cleaned and dressed prior to discharge.    Review of Systems:   As documented above    Vitals:    24 1606   BP: 120/68   Pulse: 70   Temp: 97.4 °F (36.3 °C)   TempSrc: Temporal   Weight: 78 kg (172 lb)   Height: 1.702 m (5' 7\")      Estimated body mass index is 26.94 kg/m² as calculated from the

## 2024-03-12 ENCOUNTER — OFFICE VISIT (OUTPATIENT)
Dept: INTERNAL MEDICINE CLINIC | Age: 79
End: 2024-03-12

## 2024-03-12 VITALS
HEART RATE: 70 BPM | HEIGHT: 67 IN | SYSTOLIC BLOOD PRESSURE: 120 MMHG | TEMPERATURE: 97.4 F | WEIGHT: 172 LBS | DIASTOLIC BLOOD PRESSURE: 68 MMHG | BODY MASS INDEX: 27 KG/M2

## 2024-03-12 DIAGNOSIS — D64.9 ANEMIA, UNSPECIFIED TYPE: ICD-10-CM

## 2024-03-12 DIAGNOSIS — T14.8XXA MULTIPLE SKIN TEARS: ICD-10-CM

## 2024-03-12 DIAGNOSIS — S32.038A: ICD-10-CM

## 2024-03-12 DIAGNOSIS — G20.C PRIMARY PARKINSONISM: ICD-10-CM

## 2024-03-12 DIAGNOSIS — N17.9 AKI (ACUTE KIDNEY INJURY) (HCC): ICD-10-CM

## 2024-03-12 DIAGNOSIS — S32.001S CLOSED BURST FRACTURE OF LUMBAR VERTEBRA, SEQUELA: ICD-10-CM

## 2024-03-12 DIAGNOSIS — R10.32 LEFT LOWER QUADRANT ABDOMINAL PAIN: Primary | ICD-10-CM

## 2024-03-12 RX ORDER — PANTOPRAZOLE SODIUM 40 MG/1
40 TABLET, DELAYED RELEASE ORAL DAILY
COMMUNITY

## 2024-03-12 SDOH — ECONOMIC STABILITY: FOOD INSECURITY: WITHIN THE PAST 12 MONTHS, THE FOOD YOU BOUGHT JUST DIDN'T LAST AND YOU DIDN'T HAVE MONEY TO GET MORE.: NEVER TRUE

## 2024-03-12 SDOH — ECONOMIC STABILITY: INCOME INSECURITY: HOW HARD IS IT FOR YOU TO PAY FOR THE VERY BASICS LIKE FOOD, HOUSING, MEDICAL CARE, AND HEATING?: VERY HARD

## 2024-03-12 SDOH — ECONOMIC STABILITY: FOOD INSECURITY: WITHIN THE PAST 12 MONTHS, YOU WORRIED THAT YOUR FOOD WOULD RUN OUT BEFORE YOU GOT MONEY TO BUY MORE.: NEVER TRUE

## 2024-03-12 NOTE — ASSESSMENT & PLAN NOTE
Likely due to dehydration. His wife will work with him to increase fluid intake, which may also help with his postural instability.

## 2024-03-12 NOTE — ASSESSMENT & PLAN NOTE
Due to recent fall. No signs of infection. Wounds re-dressed in the office. Wife will change bandages qod and call office for swelling, erythema, pain, fevers or purulent exudate.

## 2024-03-12 NOTE — ASSESSMENT & PLAN NOTE
Hemoglobin in ED improved, but may have been due to hemoconcentration. Will check iron studies to ensure repletion after major upper GI bleed 12/23. No signs of recurrent bleeding on repeat EGD 3/8. Continue daily PPI as long as requires daily ASA for cardiovascular protection.

## 2024-03-12 NOTE — ASSESSMENT & PLAN NOTE
Dyskinesias worse since Sinemet increased at recent neurology visit- wife will  to discuss possible dose adjustment. Likely contributing to frequent falls. Discussed strategies to decrease falls risk, such as consistent use of walker and wheelchair in higher risk situations.

## 2024-03-12 NOTE — ASSESSMENT & PLAN NOTE
Still having mild, intermittent symptoms, likely due to constipation. Since he is having difficulty adding more fiber to his diet, he will start daily Miralax in addition to Senokot. Patient will call if symptoms change or worsen.

## 2024-03-18 ENCOUNTER — TELEPHONE (OUTPATIENT)
Dept: INTERNAL MEDICINE CLINIC | Age: 79
End: 2024-03-18

## 2024-03-18 LAB
ANION GAP SERPL CALCULATED.3IONS-SCNC: 10 MMOL/L (ref 5–13)
BUN / CREAT RATIO: 24
BUN BLDV-MCNC: 33 MG/DL (ref 7–25)
CALCIUM SERPL-MCNC: 9.7 MG/DL (ref 8.5–10.5)
CHLORIDE BLD-SCNC: 105 MMOL/L (ref 98–110)
CO2: 23 MMOL/L (ref 22–29)
CREAT SERPL-MCNC: 1.36 MG/DL (ref 0.5–1.3)
EGFR (CKD-EPI): 53 SEE NOTE
FERRITIN: 213 NG/ML (ref 20–250)
GLUCOSE BLD-MCNC: 200 MG/DL (ref 71–99)
IRON: 59 UG/DL (ref 65–175)
PCT TRANSFERRIN: 23 % (ref 20–50)
POTASSIUM SERPL-SCNC: 4.8 MMOL/L (ref 3.5–5.1)
SODIUM BLD-SCNC: 138 MMOL/L (ref 135–146)
TOTAL IRON BINDING CAPACITY: 254 UG/DL (ref 250–450)

## 2024-03-18 NOTE — TELEPHONE ENCOUNTER
Evidence of an L3 vertebral fracture was found incidentally of a CT scan, so now he needs an assessment of his bone density.

## 2024-03-18 NOTE — TELEPHONE ENCOUNTER
Graciela from scheduling Department is calling in for  in regards to patient needing a   DEXA BONE DENSITY AXIAL SKELETON (Order #[id number]) on 3/12/24     Per Graciela due to the diagnostic code she is wanting to know if dexa is needed for osteopetrosis or is it a vertebral fracture assessment?     Please call Graciela and advise at:  P# [phone]

## 2024-03-19 NOTE — TELEPHONE ENCOUNTER
I called Graciela back and she stated her question is:     Is this supposed to be a vertebral fracture assessment or a screening dexa scan to check for osteoporosis?       Please advise which one

## 2024-03-19 NOTE — TELEPHONE ENCOUNTER
Called Graciela again and she could not hear me. Call was disconnected. I called back immediately and it went straight to voicemail. Will try again later

## 2024-03-20 ENCOUNTER — HOSPITAL ENCOUNTER (OUTPATIENT)
Dept: GENERAL RADIOLOGY | Age: 79
Discharge: HOME | End: 2024-03-20
Attending: INTERNAL MEDICINE
Payer: MEDICARE

## 2024-03-20 DIAGNOSIS — S32.038A: ICD-10-CM

## 2024-03-20 PROCEDURE — 77080 DXA BONE DENSITY AXIAL: CPT

## 2024-03-21 LAB — SOLUBLE TRANSFERRIN RECEPT: 19.6 NMOL/L (ref 12.2–27.3)

## 2024-03-27 ENCOUNTER — TELEPHONE (OUTPATIENT)
Dept: INTERNAL MEDICINE CLINIC | Age: 79
End: 2024-03-27

## 2024-03-27 NOTE — TELEPHONE ENCOUNTER
Please call patient to convey content of unviewed Result Note containing interpretation of recent dexa results.

## 2024-03-28 ENCOUNTER — TELEPHONE (OUTPATIENT)
Dept: INTERNAL MEDICINE CLINIC | Age: 79
End: 2024-03-28

## 2024-03-28 NOTE — TELEPHONE ENCOUNTER
Spoke with patient and wife, they want to do when you would like to repeat the iron studies?   They also said they are waiting on an order for catheters from [facility], I do not see anything scanned in the chart.  There number is [phone].  I will have them fax it to us.

## 2024-03-28 NOTE — TELEPHONE ENCOUNTER
Since iron studies looked good, will not need to repeat for 6 months unless there is clinical evidence of recurrent bleeding. I did not see a form for the catheters in my In Basket on Tuesday- I will complete tomorrow while in the office.

## 2024-03-28 NOTE — TELEPHONE ENCOUNTER
Found LOMN for catheters in Dr. Cano basket, please complete so it can be faxed.  Patient's wife is aware we will be faxing it in.

## 2024-04-04 ENCOUNTER — TELEPHONE (OUTPATIENT)
Dept: INTERNAL MEDICINE CLINIC | Age: 79
End: 2024-04-04

## 2024-04-04 PROBLEM — Q55.64 HIDDEN PENIS: Status: ACTIVE | Noted: 2024-04-04

## 2024-04-04 PROBLEM — N17.9 AKI (ACUTE KIDNEY INJURY) (HCC): Status: RESOLVED | Noted: 2017-09-28 | Resolved: 2024-04-04

## 2024-04-04 PROBLEM — T14.8XXA MULTIPLE SKIN TEARS: Status: RESOLVED | Noted: 2024-03-12 | Resolved: 2024-04-04

## 2024-04-04 PROBLEM — N48.83 ACQUIRED BURIED PENIS: Status: ACTIVE | Noted: 2024-04-04

## 2024-04-04 NOTE — ASSESSMENT & PLAN NOTE
Home BG readings low to mid 100s. Will continue current medications as long as follow up HgbA1c remains < 8.0.  Follow up with endocrinology, as directed. Renal function has stabilized, but will need to watch closely on chronic PPI.

## 2024-04-04 NOTE — ASSESSMENT & PLAN NOTE
Fatigue is likely multifactorial, but will adjust levothyroxine dose if indicated by lab results.

## 2024-04-04 NOTE — PROGRESS NOTES
Medicare Annual Wellness Visit    Yannick Ortiz is here for Medicare AWV    Assessment & Plan   Medicare annual wellness visit, subsequent  RSV and COVID vaccines recommended in the fall  Type 2 diabetes mellitus with stage 3b chronic kidney disease, without long-term current use of insulin (HCC)  Assessment & Plan:  Home BG readings low to mid 100s. Will continue current medications as long as follow up HgbA1c remains < 8.0.  Follow up with endocrinology, as directed. Renal function has stabilized, but will need to watch closely on chronic PPI.   Orders:  -     Hemoglobin A1C; Future  Urinary frequency  Assessment & Plan:  Etiology likely a combination of overactive bladder, likely exacerbated by Parkinson's Disease and prostate cancer/BPH. He has not responded to multiple medications per urology and is still experiencing severe nocturia- 5-6 episodes/night, so would benefit from external urinary catheters to help prevent falls at night and improve sleep and overall quality of life. Patient is not a candidate for condom catheters due to retracted anatomy.  Acquired buried penis  Assessment & Plan:  See plan for urinary frequency.   Pure hypercholesterolemia  Assessment & Plan:  Tolerating current dose(s) of Lipitor- continue.    Orders:  -     Lipid, Fasting; Future  Hypothyroidism due to acquired atrophy of thyroid  Assessment & Plan:  Fatigue is likely multifactorial, but will adjust levothyroxine dose if indicated by lab results.    Orders:  -     TSH; Future  Other iron deficiency anemia  Assessment & Plan:  Improved per recent labs with near repletion of iron stores.  No signs of recurrent bleeding on repeat EGD 3/8. Continue daily PPI as long as requires daily ASA for cardiovascular protection.    Closed burst fracture of lumbar vertebra, sequela  Assessment & Plan:  Chronic, asymptomatic. Recent dexa scan showed normal bone density.   Prostate cancer (HCC)  Assessment & Plan:  Last PSA 7/23 stable, but he

## 2024-04-04 NOTE — ASSESSMENT & PLAN NOTE
Etiology likely a combination of overactive bladder, likely exacerbated by Parkinson's Disease and prostate cancer/BPH. He has not responded to multiple medications per urology and is still experiencing severe nocturia- 5-6 episodes/night, so would benefit from external urinary catheters to help prevent falls at night and improve sleep and overall quality of life. Patient is not a candidate for condom catheters due to retracted anatomy.

## 2024-04-04 NOTE — ASSESSMENT & PLAN NOTE
Last PSA 7/23 stable, but he was encouraged to follow up with urology for continued active surveillance.

## 2024-04-04 NOTE — TELEPHONE ENCOUNTER
I was planning to document medical necessity for the catheters tomorrow when I see him for his AWV. We can fax that progress note to [facility].

## 2024-04-04 NOTE — ASSESSMENT & PLAN NOTE
Improved per recent labs with near repletion of iron stores.  No signs of recurrent bleeding on repeat EGD 3/8. Continue daily PPI as long as requires daily ASA for cardiovascular protection.

## 2024-04-04 NOTE — TELEPHONE ENCOUNTER
Harsha with [facility] is calling because they need an addendum to order faxed 3/29 that notes patient is being treated for Incontinence and Possible UTI.  If no UTI then patient is not considered a good candidate for Condom Catheter.  Please fax addended form to fax#:  [phone].  I printed off  previous forms and set in Dr. Najera's MA's incoming box.  Any questions/concerns please call Harsha at number provided.

## 2024-04-05 ENCOUNTER — OFFICE VISIT (OUTPATIENT)
Dept: INTERNAL MEDICINE CLINIC | Age: 79
End: 2024-04-05

## 2024-04-05 VITALS
BODY MASS INDEX: 25.18 KG/M2 | SYSTOLIC BLOOD PRESSURE: 88 MMHG | OXYGEN SATURATION: 95 % | HEIGHT: 69 IN | WEIGHT: 170 LBS | HEART RATE: 84 BPM | DIASTOLIC BLOOD PRESSURE: 48 MMHG

## 2024-04-05 DIAGNOSIS — E03.4 HYPOTHYROIDISM DUE TO ACQUIRED ATROPHY OF THYROID: ICD-10-CM

## 2024-04-05 DIAGNOSIS — E11.22 TYPE 2 DIABETES MELLITUS WITH STAGE 3B CHRONIC KIDNEY DISEASE, WITHOUT LONG-TERM CURRENT USE OF INSULIN (HCC): ICD-10-CM

## 2024-04-05 DIAGNOSIS — C61 PROSTATE CANCER (HCC): ICD-10-CM

## 2024-04-05 DIAGNOSIS — F32.2 CURRENT SEVERE EPISODE OF MAJOR DEPRESSIVE DISORDER WITHOUT PSYCHOTIC FEATURES WITHOUT PRIOR EPISODE (HCC): ICD-10-CM

## 2024-04-05 DIAGNOSIS — E78.00 PURE HYPERCHOLESTEROLEMIA: ICD-10-CM

## 2024-04-05 DIAGNOSIS — Z00.00 MEDICARE ANNUAL WELLNESS VISIT, SUBSEQUENT: Primary | ICD-10-CM

## 2024-04-05 DIAGNOSIS — S32.001S CLOSED BURST FRACTURE OF LUMBAR VERTEBRA, SEQUELA: ICD-10-CM

## 2024-04-05 DIAGNOSIS — D50.8 OTHER IRON DEFICIENCY ANEMIA: ICD-10-CM

## 2024-04-05 DIAGNOSIS — N18.32 TYPE 2 DIABETES MELLITUS WITH STAGE 3B CHRONIC KIDNEY DISEASE, WITHOUT LONG-TERM CURRENT USE OF INSULIN (HCC): ICD-10-CM

## 2024-04-05 DIAGNOSIS — N48.83 ACQUIRED BURIED PENIS: ICD-10-CM

## 2024-04-05 DIAGNOSIS — R35.0 URINARY FREQUENCY: ICD-10-CM

## 2024-04-05 RX ORDER — PANTOPRAZOLE SODIUM 40 MG/1
40 TABLET, DELAYED RELEASE ORAL DAILY
Qty: 90 TABLET | Refills: 1 | Status: SHIPPED | OUTPATIENT
Start: 2024-04-05

## 2024-04-05 RX ORDER — ESCITALOPRAM OXALATE 10 MG/1
10 TABLET ORAL DAILY
Qty: 90 TABLET | Refills: 0 | Status: SHIPPED | OUTPATIENT
Start: 2024-04-05

## 2024-04-05 ASSESSMENT — PATIENT HEALTH QUESTIONNAIRE - PHQ9
1. LITTLE INTEREST OR PLEASURE IN DOING THINGS: NEARLY EVERY DAY
5. POOR APPETITE OR OVEREATING: NEARLY EVERY DAY
6. FEELING BAD ABOUT YOURSELF - OR THAT YOU ARE A FAILURE OR HAVE LET YOURSELF OR YOUR FAMILY DOWN: NEARLY EVERY DAY
SUM OF ALL RESPONSES TO PHQ QUESTIONS 1-9: 20
7. TROUBLE CONCENTRATING ON THINGS, SUCH AS READING THE NEWSPAPER OR WATCHING TELEVISION: SEVERAL DAYS
10. IF YOU CHECKED OFF ANY PROBLEMS, HOW DIFFICULT HAVE THESE PROBLEMS MADE IT FOR YOU TO DO YOUR WORK, TAKE CARE OF THINGS AT HOME, OR GET ALONG WITH OTHER PEOPLE: EXTREMELY DIFFICULT
2. FEELING DOWN, DEPRESSED OR HOPELESS: NEARLY EVERY DAY
3. TROUBLE FALLING OR STAYING ASLEEP: NEARLY EVERY DAY
SUM OF ALL RESPONSES TO PHQ QUESTIONS 1-9: 20
4. FEELING TIRED OR HAVING LITTLE ENERGY: NEARLY EVERY DAY
SUM OF ALL RESPONSES TO PHQ QUESTIONS 1-9: 20
SUM OF ALL RESPONSES TO PHQ9 QUESTIONS 1 & 2: 6
9. THOUGHTS THAT YOU WOULD BE BETTER OFF DEAD, OR OF HURTING YOURSELF: SEVERAL DAYS
SUM OF ALL RESPONSES TO PHQ QUESTIONS 1-9: 19
8. MOVING OR SPEAKING SO SLOWLY THAT OTHER PEOPLE COULD HAVE NOTICED. OR THE OPPOSITE, BEING SO FIGETY OR RESTLESS THAT YOU HAVE BEEN MOVING AROUND A LOT MORE THAN USUAL: NOT AT ALL

## 2024-04-05 ASSESSMENT — COLUMBIA-SUICIDE SEVERITY RATING SCALE - C-SSRS
2. HAVE YOU ACTUALLY HAD ANY THOUGHTS OF KILLING YOURSELF?: NO
1. WITHIN THE PAST MONTH, HAVE YOU WISHED YOU WERE DEAD OR WISHED YOU COULD GO TO SLEEP AND NOT WAKE UP?: NO
6. HAVE YOU EVER DONE ANYTHING, STARTED TO DO ANYTHING, OR PREPARED TO DO ANYTHING TO END YOUR LIFE?: NO

## 2024-04-05 ASSESSMENT — LIFESTYLE VARIABLES
HOW MANY STANDARD DRINKS CONTAINING ALCOHOL DO YOU HAVE ON A TYPICAL DAY: PATIENT DOES NOT DRINK
HOW OFTEN DO YOU HAVE A DRINK CONTAINING ALCOHOL: NEVER

## 2024-04-05 NOTE — PATIENT INSTRUCTIONS
other health problems such as diabetes, high blood pressure, and high cholesterol. If you think you may have a problem with alcohol or drug use, talk to your doctor.   Medicines    Take your medicines exactly as prescribed. Call your doctor if you think you are having a problem with your medicine.     If your doctor recommends aspirin, take the amount directed each day. Make sure you take aspirin and not another kind of pain reliever, such as acetaminophen (Tylenol).   When should you call for help?   Call 911 if you have symptoms of a heart attack. These may include:    Chest pain or pressure, or a strange feeling in the chest.     Sweating.     Shortness of breath.     Pain, pressure, or a strange feeling in the back, neck, jaw, or upper belly or in one or both shoulders or arms.     Lightheadedness or sudden weakness.     A fast or irregular heartbeat.   After you call 911, the  may tell you to chew 1 adult-strength or 2 to 4 low-dose aspirin. Wait for an ambulance. Do not try to drive yourself.  Watch closely for changes in your health, and be sure to contact your doctor if you have any problems.  Where can you learn more?  Go to https://[email or web address].net/patientEd and enter F075 to learn more about \"A Healthy Heart: Care Instructions.\"  Current as of: June 24, 2023               Content Version: 14.0  © 2006-2024 [employer].   Care instructions adapted under license by [employer]. If you have questions about a medical condition or this instruction, always ask your healthcare professional. [employer] disclaims any warranty or liability for your use of this information.      Personalized Preventive Plan for Yannick Ortiz - 4/5/2024  Medicare offers a range of preventive health benefits. Some of the tests and screenings are paid in full while other may be subject to a deductible, co-insurance, and/or copay.    Some of these benefits include a comprehensive review of your

## 2024-04-05 NOTE — ASSESSMENT & PLAN NOTE
Worse with progression of nocturia and Parkinson's symptoms. He is willing to try Lexapro at 5 mg qd x1-2 weeks, then 10 mg qd. He will discuss counseling options at [facility] . Patient will call for any significant medication side effects or worsening symptoms of depression.

## 2024-04-08 DIAGNOSIS — E11.22 TYPE 2 DIABETES MELLITUS WITH STAGE 3B CHRONIC KIDNEY DISEASE, WITHOUT LONG-TERM CURRENT USE OF INSULIN (HCC): ICD-10-CM

## 2024-04-08 DIAGNOSIS — E03.4 HYPOTHYROIDISM DUE TO ACQUIRED ATROPHY OF THYROID: ICD-10-CM

## 2024-04-08 DIAGNOSIS — N18.32 TYPE 2 DIABETES MELLITUS WITH STAGE 3B CHRONIC KIDNEY DISEASE, WITHOUT LONG-TERM CURRENT USE OF INSULIN (HCC): ICD-10-CM

## 2024-04-08 DIAGNOSIS — E78.00 PURE HYPERCHOLESTEROLEMIA: ICD-10-CM

## 2024-04-09 ENCOUNTER — TELEPHONE (OUTPATIENT)
Dept: INTERNAL MEDICINE CLINIC | Age: 79
End: 2024-04-09

## 2024-04-09 LAB
CHOLEST SERPL-MCNC: 109 MG/DL (ref 0–199)
EST. AVERAGE GLUCOSE BLD GHB EST-MCNC: 159.9 MG/DL
HBA1C MFR BLD: 7.2 %
HDLC SERPL-MCNC: 43 MG/DL (ref 40–60)
LDL CHOLESTEROL CALCULATED: 50 MG/DL
TRIGL SERPL-MCNC: 82 MG/DL (ref 0–150)
TSH SERPL DL<=0.005 MIU/L-ACNC: 1.17 UIU/ML (ref 0.27–4.2)
VLDLC SERPL CALC-MCNC: 16 MG/DL

## 2024-04-09 NOTE — TELEPHONE ENCOUNTER
Madiha from [facility] is calling for  in regards to the fax they received for the addendum for medical necessity. Per Madiha they are needing another addendum stated that patient has incontinence ( word needs to be used not just leaking ) and a reasoning as to why patient would not be a good candidate for a condom catheter.     Please fax information needed to [phone]    For any questions feel free to call Madiha at :  P# [phone]

## 2024-04-16 ENCOUNTER — TELEPHONE (OUTPATIENT)
Dept: INTERNAL MEDICINE CLINIC | Age: 79
End: 2024-04-16

## 2024-04-16 ENCOUNTER — OFFICE VISIT (OUTPATIENT)
Dept: PRIMARY CARE CLINIC | Age: 79
End: 2024-04-16

## 2024-04-16 VITALS
OXYGEN SATURATION: 94 % | HEIGHT: 68 IN | HEART RATE: 83 BPM | SYSTOLIC BLOOD PRESSURE: 130 MMHG | WEIGHT: 162.03 LBS | TEMPERATURE: 97.1 F | DIASTOLIC BLOOD PRESSURE: 86 MMHG | BODY MASS INDEX: 24.56 KG/M2

## 2024-04-16 DIAGNOSIS — R53.1 WEAKNESS: ICD-10-CM

## 2024-04-16 DIAGNOSIS — R55 SYNCOPE, UNSPECIFIED SYNCOPE TYPE: ICD-10-CM

## 2024-04-16 DIAGNOSIS — G20.C PRIMARY PARKINSONISM (HCC): Primary | ICD-10-CM

## 2024-04-16 DIAGNOSIS — R63.4 WEIGHT LOSS: ICD-10-CM

## 2024-04-16 DIAGNOSIS — Z86.73 HISTORY OF CVA (CEREBROVASCULAR ACCIDENT): ICD-10-CM

## 2024-04-16 DIAGNOSIS — N18.32 TYPE 2 DIABETES MELLITUS WITH STAGE 3B CHRONIC KIDNEY DISEASE, WITHOUT LONG-TERM CURRENT USE OF INSULIN (HCC): ICD-10-CM

## 2024-04-16 DIAGNOSIS — E03.4 HYPOTHYROIDISM DUE TO ACQUIRED ATROPHY OF THYROID: ICD-10-CM

## 2024-04-16 DIAGNOSIS — E11.22 TYPE 2 DIABETES MELLITUS WITH STAGE 3B CHRONIC KIDNEY DISEASE, WITHOUT LONG-TERM CURRENT USE OF INSULIN (HCC): ICD-10-CM

## 2024-04-16 DIAGNOSIS — I25.10 CORONARY ARTERY DISEASE INVOLVING NATIVE CORONARY ARTERY OF NATIVE HEART WITHOUT ANGINA PECTORIS: ICD-10-CM

## 2024-04-16 RX ORDER — ESCITALOPRAM OXALATE 10 MG/1
10 TABLET ORAL DAILY
Qty: 90 TABLET | Refills: 1 | Status: SHIPPED | OUTPATIENT
Start: 2024-04-16

## 2024-04-16 NOTE — PROGRESS NOTES
REPAIR  1995, 2007    right, left    TONSILLECTOMY  1958     Family History   Problem Relation Age of Onset    COPD Father     Diabetes Father     Heart Attack Father     Cancer Father 71        Prostate    Heart Disease Father     Diabetes Brother      Social History     Socioeconomic History    Marital status:      Spouse name: None    Number of children: None    Years of education: None    Highest education level: None   Tobacco Use    Smoking status: Never    Smokeless tobacco: Never   Substance and Sexual Activity    Alcohol use: No    Drug use: No    Sexual activity: Not Currently     Partners: Female     Social Determinants of Health     Financial Resource Strain: High Risk (3/12/2024)    Overall Financial Resource Strain (CARDIA)     Difficulty of Paying Living Expenses: Very hard   Food Insecurity: No Food Insecurity (3/12/2024)    Hunger Vital Sign     Worried About Running Out of Food in the Last Year: Never true     Ran Out of Food in the Last Year: Never true   Transportation Needs: Unknown (3/12/2024)    PRAPARE - Transportation     Lack of Transportation (Non-Medical): No   Physical Activity: Inactive (4/5/2024)    Exercise Vital Sign     Days of Exercise per Week: 0 days     Minutes of Exercise per Session: 10 min   Intimate Partner Violence: Not At Risk (10/5/2022)    Humiliation, Afraid, Rape, and Kick questionnaire     Fear of Current or Ex-Partner: No     Emotionally Abused: No     Physically Abused: No     Sexually Abused: No   Housing Stability: Unknown (3/12/2024)    Housing Stability Vital Sign     Unstable Housing in the Last Year: No     Current Outpatient Medications   Medication Sig Dispense Refill    pantoprazole (PROTONIX) 40 MG tablet Take 1 tablet by mouth daily 90 tablet 1    escitalopram (LEXAPRO) 10 MG tablet Take 1 tablet by mouth daily Start with 5 mg per day for the first 1-2 weeks (break tablet in half) 90 tablet 0    levothyroxine (SYNTHROID) 75 MCG tablet TAKE 1 TABLET

## 2024-04-19 ASSESSMENT — ENCOUNTER SYMPTOMS
RESPIRATORY NEGATIVE: 1
GASTROINTESTINAL NEGATIVE: 1
EYES NEGATIVE: 1

## 2024-05-14 ENCOUNTER — TELEPHONE (OUTPATIENT)
Dept: PRIMARY CARE CLINIC | Age: 79
End: 2024-05-14

## 2024-05-14 DIAGNOSIS — G20.C PARKINSONISM, UNSPECIFIED PARKINSONISM TYPE (HCC): Primary | ICD-10-CM

## 2024-05-24 ENCOUNTER — TELEPHONE (OUTPATIENT)
Dept: PRIMARY CARE CLINIC | Age: 79
End: 2024-05-24

## 2024-05-24 NOTE — TELEPHONE ENCOUNTER
Spoke with Denisha from [facility] gave verbal order for nurse to check on patient. She stated patient had a fall and now has a large skin tear on left forearm and left posterior elbow.

## 2024-05-24 NOTE — TELEPHONE ENCOUNTER
[facility] called and states pt has a large skin tear on his forearm. She was requesting approval from Dr. Avila to send a nurse to his home to check on him.

## 2024-07-23 ENCOUNTER — OFFICE VISIT (OUTPATIENT)
Dept: PRIMARY CARE CLINIC | Age: 79
End: 2024-07-23

## 2024-07-23 VITALS
TEMPERATURE: 98 F | DIASTOLIC BLOOD PRESSURE: 76 MMHG | SYSTOLIC BLOOD PRESSURE: 121 MMHG | HEIGHT: 67 IN | BODY MASS INDEX: 25.38 KG/M2 | HEART RATE: 87 BPM | OXYGEN SATURATION: 94 %

## 2024-07-23 DIAGNOSIS — R29.6 FALLS FREQUENTLY: ICD-10-CM

## 2024-07-23 DIAGNOSIS — E11.22 TYPE 2 DIABETES MELLITUS WITH STAGE 3B CHRONIC KIDNEY DISEASE, WITHOUT LONG-TERM CURRENT USE OF INSULIN (HCC): ICD-10-CM

## 2024-07-23 DIAGNOSIS — I25.10 CORONARY ARTERY DISEASE INVOLVING NATIVE CORONARY ARTERY OF NATIVE HEART WITHOUT ANGINA PECTORIS: ICD-10-CM

## 2024-07-23 DIAGNOSIS — G20.C PARKINSONISM, UNSPECIFIED PARKINSONISM TYPE (HCC): Primary | ICD-10-CM

## 2024-07-23 DIAGNOSIS — R63.4 WEIGHT LOSS: ICD-10-CM

## 2024-07-23 DIAGNOSIS — N18.32 TYPE 2 DIABETES MELLITUS WITH STAGE 3B CHRONIC KIDNEY DISEASE, WITHOUT LONG-TERM CURRENT USE OF INSULIN (HCC): ICD-10-CM

## 2024-07-23 DIAGNOSIS — R33.9 URINARY RETENTION: ICD-10-CM

## 2024-07-23 RX ORDER — CIPROFLOXACIN 500 MG/1
TABLET, FILM COATED ORAL
COMMUNITY
Start: 2024-07-14

## 2024-07-23 RX ORDER — TROSPIUM CHLORIDE 20 MG/1
20 TABLET, FILM COATED ORAL 2 TIMES DAILY
COMMUNITY
Start: 2024-07-05

## 2024-07-23 RX ORDER — MIDODRINE HYDROCHLORIDE 5 MG/1
5 TABLET ORAL 3 TIMES DAILY
COMMUNITY
Start: 2024-07-08

## 2024-07-23 RX ORDER — CARBIDOPA AND LEVODOPA 50; 200 MG/1; MG/1
1 TABLET, EXTENDED RELEASE ORAL NIGHTLY
COMMUNITY

## 2024-07-23 ASSESSMENT — ENCOUNTER SYMPTOMS
GASTROINTESTINAL NEGATIVE: 1
RESPIRATORY NEGATIVE: 1
EYES NEGATIVE: 1

## 2024-07-23 NOTE — PROGRESS NOTES
SUBJECTIVE:  Patient ID: Yannick Ortiz is a 79 y.o. y.o. male     HPI   Patient presented with his wife for follow-up  Patient with Parkinson medication followed by neurology has been worsening having multiple falls Home PT was denied by his insurance now is starting with trach  He does use a walker at home  He feels very weak at the time when he is standing and then when he falls no warning according to him and does not have much to time to catch himself even though he does have the feeling able to fall his legs get very weak  Patient coronary artery disease stable currently  He has been losing weight not much of appetite it is hard for him to eat and swallow  Patient very retention has been to the emergency room twice he is followed by urology now he has indwelling catheter abnormal PSA is scheduled to have prostate biopsy and cystoscopy he will come back for preop  Past Medical History:   Diagnosis Date    CAD (coronary artery disease)     Cerebral artery occlusion with cerebral infarction (HCC) 05/2016    left frontal lobe    Diabetes mellitus, type 2 (HCC)     Diverticulitis     Hyperlipidemia     Osteoarthritis     Positive PPD, treated     treated with INH    Prostate cancer (HCC) 04/2013    Venkat active surveillance    Upper GI bleed 01/11/2024 12/23- large duodenal ulcer      Past Surgical History:   Procedure Laterality Date    APPENDECTOMY  1969    CORONARY ARTERY BYPASS GRAFT  09/13/2016    3V    EYE SURGERY Left 2004    cataract surgery-right    HERNIA REPAIR Bilateral     right ing, left ing    KNEE ARTHROPLASTY Left 01/08/2014    Left total knee replacement    PROSTATE BIOPSY  03/02/2022    ROTATOR CUFF REPAIR  1995, 2007    right, left    TONSILLECTOMY  1958     Family History   Problem Relation Age of Onset    COPD Father     Diabetes Father     Heart Attack Father     Cancer Father 71        Prostate    Heart Disease Father     Diabetes Brother      Social History     Socioeconomic History

## 2024-07-25 ENCOUNTER — OFFICE VISIT (OUTPATIENT)
Dept: PRIMARY CARE CLINIC | Age: 79
End: 2024-07-25

## 2024-07-25 VITALS
DIASTOLIC BLOOD PRESSURE: 70 MMHG | OXYGEN SATURATION: 98 % | BODY MASS INDEX: 23.02 KG/M2 | SYSTOLIC BLOOD PRESSURE: 120 MMHG | HEART RATE: 76 BPM | WEIGHT: 147 LBS

## 2024-07-25 DIAGNOSIS — R33.9 URINARY RETENTION: ICD-10-CM

## 2024-07-25 DIAGNOSIS — E11.22 TYPE 2 DIABETES MELLITUS WITH STAGE 3B CHRONIC KIDNEY DISEASE, WITHOUT LONG-TERM CURRENT USE OF INSULIN (HCC): ICD-10-CM

## 2024-07-25 DIAGNOSIS — N18.32 TYPE 2 DIABETES MELLITUS WITH STAGE 3B CHRONIC KIDNEY DISEASE, WITHOUT LONG-TERM CURRENT USE OF INSULIN (HCC): ICD-10-CM

## 2024-07-25 DIAGNOSIS — Z01.818 PRE-OP EXAMINATION: Primary | ICD-10-CM

## 2024-07-25 DIAGNOSIS — R97.20 ABNORMAL PSA: ICD-10-CM

## 2024-07-25 DIAGNOSIS — R63.4 WEIGHT LOSS: ICD-10-CM

## 2024-07-25 DIAGNOSIS — G20.C PARKINSONISM, UNSPECIFIED PARKINSONISM TYPE (HCC): ICD-10-CM

## 2024-07-25 DIAGNOSIS — R29.6 FALLS FREQUENTLY: ICD-10-CM

## 2024-07-25 DIAGNOSIS — I25.10 CORONARY ARTERY DISEASE INVOLVING NATIVE CORONARY ARTERY OF NATIVE HEART WITHOUT ANGINA PECTORIS: ICD-10-CM

## 2024-07-25 LAB
BUN SERPL-MCNC: 26 MG/DL (ref 7–20)
CREAT SERPL-MCNC: 1.1 MG/DL (ref 0.8–1.3)
GFR SERPLBLD CREATININE-BSD FMLA CKD-EPI: 68 ML/MIN/{1.73_M2}

## 2024-07-25 ASSESSMENT — ENCOUNTER SYMPTOMS
GASTROINTESTINAL NEGATIVE: 1
EYES NEGATIVE: 1
RESPIRATORY NEGATIVE: 1
ALLERGIC/IMMUNOLOGIC NEGATIVE: 1

## 2024-07-25 NOTE — PROGRESS NOTES
nightly      trospium (SANCTURA) 20 MG tablet Take 1 tablet by mouth 2 times daily      midodrine (PROAMATINE) 5 MG tablet Take 1 tablet by mouth 3 times daily      ciprofloxacin (CIPRO) 500 MG tablet TAKE 1 TABLET BY MOUTH TWICE A DAY TAKE 5 DAYS PRIOR TO PROCEDURE      escitalopram (LEXAPRO) 10 MG tablet Take 1 tablet by mouth daily Take one tab a day 90 tablet 1    pantoprazole (PROTONIX) 40 MG tablet Take 1 tablet by mouth daily 90 tablet 1    levothyroxine (SYNTHROID) 75 MCG tablet TAKE 1 TABLET BY MOUTH EVERY DAY 90 tablet 1    atorvastatin (LIPITOR) 40 MG tablet TAKE 1 TABLET BY MOUTH EVERY DAY 90 tablet 1    vitamin B-12 (CYANOCOBALAMIN) 500 MCG tablet Take 1 tablet by mouth daily      NATEGLINIDE PO Take by mouth 3 times daily (before meals)      carbidopa-levodopa (SINEMET)  MG per tablet Take 2 tablets by mouth 4 times daily      aspirin 81 MG EC tablet Take 1 tablet by mouth nightly      metFORMIN (GLUCOPHAGE-XR) 500 MG extended release tablet Take 2 tablets by mouth 2 times daily       No current facility-administered medications on file prior to visit.     No Known Allergies    OBJECTIVE:  /70   Pulse 76   Wt 66.7 kg (147 lb)   SpO2 98%   BMI 23.02 kg/m²   Physical Exam  Vitals reviewed.   Constitutional:       Appearance: He is well-developed.   HENT:      Right Ear: Tympanic membrane normal.      Left Ear: Tympanic membrane normal.   Eyes:      General: No scleral icterus.     Conjunctiva/sclera: Conjunctivae normal.   Neck:      Thyroid: No thyromegaly.      Vascular: No carotid bruit or JVD.   Cardiovascular:      Rate and Rhythm: Normal rate and regular rhythm.      Heart sounds: Normal heart sounds. No murmur heard.  Pulmonary:      Effort: Pulmonary effort is normal. No respiratory distress.      Breath sounds: Normal breath sounds. No wheezing or rales.   Chest:      Chest wall: No tenderness.   Abdominal:      General: Bowel sounds are normal. There is no distension.

## 2024-08-13 ENCOUNTER — TELEPHONE (OUTPATIENT)
Dept: PHARMACY | Facility: CLINIC | Age: 79
End: 2024-08-13

## 2024-08-13 NOTE — TELEPHONE ENCOUNTER
[facility] CLINICAL PHARMACY: ADHERENCE REVIEW  Identified care gap per [employer]: fills at [facility]: Statin adherence    Patient also appears to be prescribed: Diabetes    ASSESSMENT  STATIN ADHERENCE    Insurance Records claims through 24 (Prior Year PDC = not reported; YTD PDC = 77%; Potential Fail Date: 24):   ATORVASTATIN TAB 40MG last filled on 24 for 90 day supply. Next refill due: 24    Prescribed si tablet/capsule daily    Per Insurer Portal: last filled on same        Lab Results   Component Value Date    CHOL 167 2016    TRIG 149 2016    HDL 43 2024     Lab Results   Component Value Date    LDL 50 2024      ALT   Date Value Ref Range Status   2023 8 (L) 10 - 40 U/L Final     AST   Date Value Ref Range Status   2023 12 (L) 15 - 37 U/L Final     The ASCVD Risk score (Maryse DK, et al., 2019) failed to calculate for the following reasons:    The patient has a prior MI or stroke diagnosis     The following are interventions that have been identified:   Patient OVERDUE refilling Atorvastatin and active on home medication list.     Attempting to reach patient to review.  Left message asking for return call. [employer] message sent to patient.        Last Visit: 24  Next Visit: none      Chanel Whitaker CPhT  [facility] Clinical   [facility]  Toll Free: [phone] Option 1    For Pharmacy Admin Tracking Only    Program: [employer]  CPA in place:  No  Gap Closed?: No   Time Spent (min): 10

## 2024-09-03 ENCOUNTER — PATIENT MESSAGE (OUTPATIENT)
Dept: PRIMARY CARE CLINIC | Age: 79
End: 2024-09-03

## 2024-09-06 ENCOUNTER — PATIENT MESSAGE (OUTPATIENT)
Dept: PRIMARY CARE CLINIC | Age: 79
End: 2024-09-06

## 2024-09-06 RX ORDER — LEVOTHYROXINE SODIUM 75 UG/1
TABLET ORAL
Qty: 90 TABLET | Refills: 1 | Status: SHIPPED | OUTPATIENT
Start: 2024-09-06

## 2024-09-06 NOTE — TELEPHONE ENCOUNTER
Medication:   Requested Prescriptions     Pending Prescriptions Disp Refills    levothyroxine (SYNTHROID) 75 MCG tablet 90 tablet 1     Sig: TAKE 1 TABLET BY MOUTH EVERY DAY     Last Filled:  02/06/24    Last appt: 7/25/2024   Next appt: 9/12/2024    Last Thyroid:   Lab Results   Component Value Date/Time    TSH 1.17 04/08/2024 10:34 AM    TSH 1.82 06/14/2023 10:43 AM    T4FREE 1.0 05/17/2016 05:06 AM

## 2024-09-12 ENCOUNTER — OFFICE VISIT (OUTPATIENT)
Dept: PRIMARY CARE CLINIC | Age: 79
End: 2024-09-12

## 2024-09-12 VITALS
DIASTOLIC BLOOD PRESSURE: 60 MMHG | HEIGHT: 67 IN | OXYGEN SATURATION: 96 % | HEART RATE: 77 BPM | WEIGHT: 144.2 LBS | BODY MASS INDEX: 22.63 KG/M2 | SYSTOLIC BLOOD PRESSURE: 110 MMHG

## 2024-09-12 DIAGNOSIS — T14.8XXA OPEN WOUND: ICD-10-CM

## 2024-09-12 DIAGNOSIS — Z48.02 VISIT FOR SUTURE REMOVAL: Primary | ICD-10-CM

## 2024-09-12 ASSESSMENT — ENCOUNTER SYMPTOMS
EYES NEGATIVE: 1
RESPIRATORY NEGATIVE: 1

## 2024-09-30 RX ORDER — ESCITALOPRAM OXALATE 10 MG/1
10 TABLET ORAL DAILY
Qty: 90 TABLET | Refills: 1 | Status: SHIPPED | OUTPATIENT
Start: 2024-09-30

## 2024-09-30 NOTE — TELEPHONE ENCOUNTER
Medication:   Requested Prescriptions     Pending Prescriptions Disp Refills    escitalopram (LEXAPRO) 10 MG tablet [Pharmacy Med Name: ESCITALOPRAM 10 MG TABLET] 90 tablet 1     Sig: TAKE 1 TABLET BY MOUTH DAILY TAKE ONE TAB A DAY     Last Filled:  04/16/2024    Last appt: 9/12/2024   Next appt: Visit date not found    Last OARRS:        No data to display

## 2024-12-26 RX ORDER — LEVOTHYROXINE SODIUM 75 UG/1
TABLET ORAL
Qty: 90 TABLET | Refills: 0 | Status: SHIPPED | OUTPATIENT
Start: 2024-12-26

## 2024-12-26 NOTE — TELEPHONE ENCOUNTER
Medication:   Requested Prescriptions     Pending Prescriptions Disp Refills    levothyroxine (SYNTHROID) 75 MCG tablet [Pharmacy Med Name: LEVOTHYROXINE 75 MCG TABLET] 90 tablet 1     Sig: TAKE 1 TABLET BY MOUTH EVERY DAY     Last Filled:  09/06/2024    Last appt: 9/12/2024   Next appt: 1/14/2025    Last Thyroid:   Lab Results   Component Value Date/Time    TSH 1.17 04/08/2024 10:34 AM    TSH 1.82 06/14/2023 10:43 AM    T4FREE 1.0 05/17/2016 05:06 AM

## 2025-01-14 ENCOUNTER — OFFICE VISIT (OUTPATIENT)
Dept: PRIMARY CARE CLINIC | Age: 80
End: 2025-01-14
Payer: MEDICARE

## 2025-01-14 VITALS
HEART RATE: 78 BPM | SYSTOLIC BLOOD PRESSURE: 120 MMHG | WEIGHT: 144 LBS | HEIGHT: 67 IN | OXYGEN SATURATION: 98 % | BODY MASS INDEX: 22.6 KG/M2 | DIASTOLIC BLOOD PRESSURE: 70 MMHG

## 2025-01-14 DIAGNOSIS — F32.2 CURRENT SEVERE EPISODE OF MAJOR DEPRESSIVE DISORDER WITHOUT PSYCHOTIC FEATURES WITHOUT PRIOR EPISODE (HCC): ICD-10-CM

## 2025-01-14 DIAGNOSIS — N18.32 TYPE 2 DIABETES MELLITUS WITH STAGE 3B CHRONIC KIDNEY DISEASE, WITHOUT LONG-TERM CURRENT USE OF INSULIN (HCC): ICD-10-CM

## 2025-01-14 DIAGNOSIS — G20.C PARKINSONISM, UNSPECIFIED PARKINSONISM TYPE (HCC): ICD-10-CM

## 2025-01-14 DIAGNOSIS — C61 PROSTATE CANCER (HCC): ICD-10-CM

## 2025-01-14 DIAGNOSIS — Z00.00 MEDICARE ANNUAL WELLNESS VISIT, SUBSEQUENT: Primary | ICD-10-CM

## 2025-01-14 DIAGNOSIS — E11.22 TYPE 2 DIABETES MELLITUS WITH STAGE 3B CHRONIC KIDNEY DISEASE, WITHOUT LONG-TERM CURRENT USE OF INSULIN (HCC): ICD-10-CM

## 2025-01-14 PROCEDURE — 3074F SYST BP LT 130 MM HG: CPT | Performed by: FAMILY MEDICINE

## 2025-01-14 PROCEDURE — 1123F ACP DISCUSS/DSCN MKR DOCD: CPT | Performed by: FAMILY MEDICINE

## 2025-01-14 PROCEDURE — G0439 PPPS, SUBSEQ VISIT: HCPCS | Performed by: FAMILY MEDICINE

## 2025-01-14 PROCEDURE — 3078F DIAST BP <80 MM HG: CPT | Performed by: FAMILY MEDICINE

## 2025-01-14 PROCEDURE — 1159F MED LIST DOCD IN RCRD: CPT | Performed by: FAMILY MEDICINE

## 2025-01-14 RX ORDER — LEVODOPA AND CARBIDOPA 145; 36.25 MG/1; MG/1
CAPSULE, EXTENDED RELEASE ORAL
COMMUNITY
Start: 2024-12-26

## 2025-01-14 SDOH — ECONOMIC STABILITY: FOOD INSECURITY: WITHIN THE PAST 12 MONTHS, THE FOOD YOU BOUGHT JUST DIDN'T LAST AND YOU DIDN'T HAVE MONEY TO GET MORE.: NEVER TRUE

## 2025-01-14 SDOH — ECONOMIC STABILITY: FOOD INSECURITY: WITHIN THE PAST 12 MONTHS, YOU WORRIED THAT YOUR FOOD WOULD RUN OUT BEFORE YOU GOT MONEY TO BUY MORE.: NEVER TRUE

## 2025-01-14 ASSESSMENT — PATIENT HEALTH QUESTIONNAIRE - PHQ9
6. FEELING BAD ABOUT YOURSELF - OR THAT YOU ARE A FAILURE OR HAVE LET YOURSELF OR YOUR FAMILY DOWN: SEVERAL DAYS
1. LITTLE INTEREST OR PLEASURE IN DOING THINGS: MORE THAN HALF THE DAYS
SUM OF ALL RESPONSES TO PHQ QUESTIONS 1-9: 9
3. TROUBLE FALLING OR STAYING ASLEEP: NOT AT ALL
SUM OF ALL RESPONSES TO PHQ QUESTIONS 1-9: 9
9. THOUGHTS THAT YOU WOULD BE BETTER OFF DEAD, OR OF HURTING YOURSELF: NOT AT ALL
7. TROUBLE CONCENTRATING ON THINGS, SUCH AS READING THE NEWSPAPER OR WATCHING TELEVISION: SEVERAL DAYS
SUM OF ALL RESPONSES TO PHQ9 QUESTIONS 1 & 2: 5
4. FEELING TIRED OR HAVING LITTLE ENERGY: SEVERAL DAYS
10. IF YOU CHECKED OFF ANY PROBLEMS, HOW DIFFICULT HAVE THESE PROBLEMS MADE IT FOR YOU TO DO YOUR WORK, TAKE CARE OF THINGS AT HOME, OR GET ALONG WITH OTHER PEOPLE: SOMEWHAT DIFFICULT
5. POOR APPETITE OR OVEREATING: NOT AT ALL
8. MOVING OR SPEAKING SO SLOWLY THAT OTHER PEOPLE COULD HAVE NOTICED. OR THE OPPOSITE, BEING SO FIGETY OR RESTLESS THAT YOU HAVE BEEN MOVING AROUND A LOT MORE THAN USUAL: SEVERAL DAYS
2. FEELING DOWN, DEPRESSED OR HOPELESS: NEARLY EVERY DAY

## 2025-01-14 ASSESSMENT — LIFESTYLE VARIABLES
HOW OFTEN DO YOU HAVE A DRINK CONTAINING ALCOHOL: NEVER
HOW MANY STANDARD DRINKS CONTAINING ALCOHOL DO YOU HAVE ON A TYPICAL DAY: PATIENT DOES NOT DRINK

## 2025-01-14 NOTE — PATIENT INSTRUCTIONS
foods, canned soups, and other high-salt, high-fat, processed foods.     Read food labels and try to avoid saturated and trans fats. They increase your risk of heart disease by raising cholesterol levels.     Limit the amount of solid fat--butter, margarine, and shortening--you eat. Use olive, peanut, or canola oil when you cook. Bake, broil, and steam foods instead of frying them.     Eat a variety of fruit and vegetables every day. Dark green, deep orange, red, or yellow fruits and vegetables are especially good for you. Examples include spinach, carrots, peaches, and berries.     Foods high in fiber can reduce your cholesterol and provide important vitamins and minerals. High-fiber foods include whole-grain cereals and breads, oatmeal, beans, brown rice, citrus fruits, and apples.     Eat lean proteins. Heart-healthy proteins include seafood, lean meats and poultry, eggs, beans, peas, nuts, seeds, and soy products.     Limit drinks and foods with added sugar. These include candy, desserts, and soda pop.   Heart-healthy lifestyle    If your doctor recommends it, get more exercise. For many people, walking is a good choice. Or you may want to swim, bike, or do other activities. Bit by bit, increase the time you're active every day. Try for at least 30 minutes on most days of the week.     Try to quit or cut back on using tobacco and other nicotine products. This includes smoking and vaping. If you need help quitting, talk to your doctor about stop-smoking programs and medicines. These can increase your chances of quitting for good. Quitting is one of the most important things you can do to protect your heart. It is never too late to quit. Try to avoid secondhand smoke too.     Stay at a weight that's healthy for you. Talk to your doctor if you need help losing weight.     Try to get 7 to 9 hours of sleep each night.     Limit alcohol to 2 drinks a day for men and 1 drink a day for women. Too much alcohol can cause

## 2025-01-14 NOTE — PROGRESS NOTES
Medicare Annual Wellness Visit    Yannick Ortiz is here for Medicare AWV and Depression    Assessment & Plan    Diagnosis Orders   1. Medicare annual wellness visit, subsequent        2. Type 2 diabetes mellitus with stage 3b chronic kidney disease, without long-term current use of insulin (HCC)  CBC with Auto Differential    Basic Metabolic Panel    Lipid Panel    Hepatic Function Panel    TSH    Hemoglobin A1C    Vitamin B12 & Folate      3. Parkinsonism, unspecified Parkinsonism type (HCC)        4. Current severe episode of major depressive disorder without psychotic features without prior episode (HCC)        5. Prostate cancer (HCC)                    Subjective   The following acute and/or chronic problems were also addressed today:  Pt with parkinson stable followed by Parkinson clinic at  recently medication has been changed and that really made him feel better  Is doing better on Lexapro  Occasionally has some chest pressure he sees a cardiologist he will schedule an appointment to see him    Patient's complete Health Risk Assessment and screening values have been reviewed and are found in Flowsheets. The following problems were reviewed today and where indicated follow up appointments were made and/or referrals ordered.    Positive Risk Factor Screenings with Interventions:    Fall Risk:  Do you feel unsteady or are you worried about falling? : (!) yes  2 or more falls in past year?: (!) yes  Fall with injury in past year?: (!) yes     Interventions:    Reviewed medications, home hazards, visual acuity, and co-morbidities that can increase risk for falls  Referrals: pt sees neurology for parkinson     Cognitive:   Clock Drawing Test (CDT):  (pt has parkinson and not able to write or draw.)  Words recalled: 0 Words Recalled     Total Score Interpretation: Abnormal Mini-Cog    Interventions:  Patient sees neurology    Depression:  PHQ-2 Score: 5  PHQ-9 Total Score: 9  Total Score Interpretation: 5-9 =

## 2025-01-15 LAB
A/G RATIO: 1.8 (ref 1–2.1)
ALBUMIN: 4.2 G/DL (ref 3.5–5)
ALP BLD-CCNC: 97 U/L (ref 33–140)
ALT SERPL-CCNC: 8 U/L (ref 0–60)
ANION GAP SERPL CALCULATED.3IONS-SCNC: 11 MMOL/L (ref 5–13)
AST SERPL-CCNC: 13 U/L (ref 0–40)
BASOPHILS ABSOLUTE: 0.03 10*3/UL (ref 0–0.2)
BASOPHILS RELATIVE PERCENT: 0.7 %
BILIRUB SERPL-MCNC: 0.7 MG/DL (ref 0.2–1.2)
BILIRUBIN DIRECT: 0.3 MG/DL (ref 0–0.5)
BUN / CREAT RATIO: 27
BUN BLDV-MCNC: 32 MG/DL (ref 7–25)
CALCIUM SERPL-MCNC: 10.1 MG/DL (ref 8.5–10.5)
CHLORIDE BLD-SCNC: 107 MMOL/L (ref 98–110)
CHOLESTEROL, TOTAL: 115 MG/DL (ref 125–199)
CO2: 23 MMOL/L (ref 22–29)
CREAT SERPL-MCNC: 1.17 MG/DL (ref 0.5–1.3)
EGFR (CKD-EPI): 63 SEE NOTE
EOSINOPHILS ABSOLUTE: 0.12 10*3/UL (ref 0–0.5)
EOSINOPHILS RELATIVE PERCENT: 3 %
ESTIMATED AVERAGE GLUCOSE: 108 MG/DL (ref 68–114)
FOLATE: 8.3 NG/ML (ref 7–31.4)
GLOBULIN: 2.4 G/DL (ref 2–3.7)
GLUCOSE BLD-MCNC: 68 MG/DL (ref 71–99)
GRANULOCYTE ABSOLUTE COUNT: 2.49 10*3/UL (ref 1.5–7.8)
HBA1C MFR BLD: 5.4 % (ref 4–5.6)
HCT VFR BLD CALC: 39.7 % (ref 40–51)
HDLC SERPL-MCNC: 54 MG/DL (ref 40–180)
HEMOGLOBIN: 12.8 G/DL (ref 13.2–17.1)
IMMATURE GRANULOCYTES %: 0.01 10*3/UL (ref 0–0.1)
IMMATURE GRANULOCYTES %: 0.2 % (ref 0–2)
LDL CHOLESTEROL: 51 MG/DL (ref 0–100)
LEUKOCYTES, BLD: 4.02 10*3/UL (ref 3.8–10.8)
LYMPHOCYTES ABSOLUTE: 1.05 10*3/UL (ref 0.8–3.9)
LYMPHOCYTES RELATIVE PERCENT: 26.1 %
MCH RBC QN AUTO: 34.5 PG (ref 27–33)
MCHC RBC AUTO-ENTMCNC: 32.2 G/DL (ref 30–36)
MCV RBC AUTO: 107 FL (ref 80–100)
MONOCYTES ABSOLUTE: 0.32 10*3/UL (ref 0.2–0.9)
MONOCYTES RELATIVE PERCENT: 8 %
NEUTROPHILS RELATIVE PERCENT: 62 %
NONHDLC SERPL-MCNC: 61 MG/DL (ref 0–129)
NUCLEATED RED BLOOD CELLS: 0 /100 WBC (ref 0–0)
PDW BLD-RTO: 12.3 % (ref 11–15)
PLATELET # BLD: 171 10*3/UL (ref 140–400)
PMV BLD AUTO: 10 FL (ref 9–13)
POTASSIUM SERPL-SCNC: 4.5 MMOL/L (ref 3.5–5.1)
PROTEIN FLUID: 6.6 G/DL (ref 6–8)
RBC # BLD: 3.71 10*6/UL (ref 4.2–5.8)
SODIUM BLD-SCNC: 141 MMOL/L (ref 135–146)
TRIGL SERPL-MCNC: 48 MG/DL (ref 0–149)
TSH ULTRASENSITIVE: 1.31 UIU/ML (ref 0.35–4.94)
VITAMIN B-12: 1887 PG/ML (ref 213–816)

## 2025-02-13 ENCOUNTER — PATIENT MESSAGE (OUTPATIENT)
Dept: PRIMARY CARE CLINIC | Age: 80
End: 2025-02-13

## 2025-02-13 NOTE — TELEPHONE ENCOUNTER
Spoke with pts wife and she said he just had a bowel movement. They will keep an eye on his bowel movements and let us know if he needs to come in.

## 2025-02-18 ENCOUNTER — OFFICE VISIT (OUTPATIENT)
Dept: PRIMARY CARE CLINIC | Age: 80
End: 2025-02-18
Payer: MEDICARE

## 2025-02-18 VITALS
OXYGEN SATURATION: 100 % | BODY MASS INDEX: 22.4 KG/M2 | WEIGHT: 147.8 LBS | HEART RATE: 67 BPM | HEIGHT: 68 IN | SYSTOLIC BLOOD PRESSURE: 90 MMHG | DIASTOLIC BLOOD PRESSURE: 58 MMHG

## 2025-02-18 DIAGNOSIS — N18.32 TYPE 2 DIABETES MELLITUS WITH STAGE 3B CHRONIC KIDNEY DISEASE, WITHOUT LONG-TERM CURRENT USE OF INSULIN (HCC): ICD-10-CM

## 2025-02-18 DIAGNOSIS — E11.22 TYPE 2 DIABETES MELLITUS WITH STAGE 3B CHRONIC KIDNEY DISEASE, WITHOUT LONG-TERM CURRENT USE OF INSULIN (HCC): ICD-10-CM

## 2025-02-18 DIAGNOSIS — I95.9 HYPOTENSION, UNSPECIFIED HYPOTENSION TYPE: ICD-10-CM

## 2025-02-18 DIAGNOSIS — G20.C PRIMARY PARKINSONISM (HCC): Primary | ICD-10-CM

## 2025-02-18 DIAGNOSIS — R26.81 UNSTEADY GAIT: ICD-10-CM

## 2025-02-18 PROCEDURE — 99214 OFFICE O/P EST MOD 30 MIN: CPT | Performed by: FAMILY MEDICINE

## 2025-02-18 PROCEDURE — 3074F SYST BP LT 130 MM HG: CPT | Performed by: FAMILY MEDICINE

## 2025-02-18 PROCEDURE — 1159F MED LIST DOCD IN RCRD: CPT | Performed by: FAMILY MEDICINE

## 2025-02-18 PROCEDURE — 3044F HG A1C LEVEL LT 7.0%: CPT | Performed by: FAMILY MEDICINE

## 2025-02-18 PROCEDURE — 3078F DIAST BP <80 MM HG: CPT | Performed by: FAMILY MEDICINE

## 2025-02-18 PROCEDURE — 1123F ACP DISCUSS/DSCN MKR DOCD: CPT | Performed by: FAMILY MEDICINE

## 2025-02-18 ASSESSMENT — ENCOUNTER SYMPTOMS
EYES NEGATIVE: 1
RESPIRATORY NEGATIVE: 1
GASTROINTESTINAL NEGATIVE: 1

## 2025-02-18 NOTE — PROGRESS NOTES
SUBJECTIVE:  Patient ID: Yannick Ortiz is a 79 y.o. y.o. male     HPI   Yannick presented today with his wife  He was seen by his neurologist  He had multiple falls he had fallen yesterday  Very hard to to maneuver at home  According to wife his getting worse  His neurologist recommended palliative care  He has been eating okay according to him his appetite is good according to wife he likes to snack  Patient with diabetes last blood test showed low sugar and low A1c I recommend stopping it  Past Medical History:   Diagnosis Date    CAD (coronary artery disease)     Cerebral artery occlusion with cerebral infarction (HCC) 05/2016    left frontal lobe    Diabetes mellitus, type 2 (HCC)     Diverticulitis     Hyperlipidemia     Osteoarthritis     Positive PPD, treated     treated with INH    Prostate cancer (HCC) 04/2013    Venkat active surveillance    Upper GI bleed 01/11/2024 12/23- large duodenal ulcer      Past Surgical History:   Procedure Laterality Date    APPENDECTOMY  1969    CORONARY ARTERY BYPASS GRAFT  09/13/2016    3V    EYE SURGERY Left 2004    cataract surgery-right    HERNIA REPAIR Bilateral     right ing, left ing    KNEE ARTHROPLASTY Left 01/08/2014    Left total knee replacement    PROSTATE BIOPSY  03/02/2022    ROTATOR CUFF REPAIR  1995, 2007    right, left    TONSILLECTOMY  1958     Family History   Problem Relation Age of Onset    COPD Father     Diabetes Father     Heart Attack Father     Cancer Father 71        Prostate    Heart Disease Father     Diabetes Brother      Social History     Socioeconomic History    Marital status:      Spouse name: None    Number of children: None    Years of education: None    Highest education level: None   Tobacco Use    Smoking status: Never    Smokeless tobacco: Never   Substance and Sexual Activity    Alcohol use: No    Drug use: No    Sexual activity: Not Currently     Partners: Female     Social Determinants of Health     Financial Resource

## 2025-03-04 ENCOUNTER — PATIENT MESSAGE (OUTPATIENT)
Dept: PRIMARY CARE CLINIC | Age: 80
End: 2025-03-04

## 2025-05-04 ENCOUNTER — PATIENT MESSAGE (OUTPATIENT)
Dept: PRIMARY CARE CLINIC | Age: 80
End: 2025-05-04

## 2025-05-05 NOTE — PROGRESS NOTES
ENCOUNTER DATE: 5/6/2025     NAME: Yannick Ortiz   AGE: 79 y.o.   GENDER: male   YOB: 1945    Chief Complaint   Patient presents with    Follow-up     Pt in ED 5/2/25 for a fall        ASSESSMENT/PLAN:  1. Fall, subsequent encounter  ED notes reviewed including imaging reports  Recommend to continue with Tylenol as needed  Fall prevention information    2. Unsteady gait  Continue with use of ambulatory device at all times    3. Primary parkinsonism (HCC)  Continue per neuro      Return in about 3 months (around 8/6/2025) for chronic visit- with PCP.     HPI:   Patient is here for an ED follow-up  Wife is present and contributes to history    Seen in ED 5/2/2025 after a mechanical fall  Was letting the dog out and lost his balance.  Landed on his butt then rolled down the steps.  He did hit his head.  Had headache and left hip pain and right elbow pain  CT of head showed no acute findings  CT of C spine showed no acute findings  Imaging of left hip and pelvis showed no acute fractures  Right elbow imaging negative    Discharged home    Feeling ok. Left hip still sore and so is bottom.   Bruise on left arm improving  Taking tylenol prn.   Elbow is ok now.   No headaches. No dizziness.  Small abrasion on left forehead  Falls frequently due to balance issues due to his Parkinson's.   Uses walker to ambulate, even in the home.       ROS:  Review of Systems   Constitutional:  Negative for chills, diaphoresis, fatigue and fever.   Respiratory:  Negative for cough, shortness of breath and wheezing.    Cardiovascular:  Negative for chest pain.   Gastrointestinal:  Negative for diarrhea, nausea and vomiting.   Genitourinary:  Negative for difficulty urinating.   Musculoskeletal:  Positive for arthralgias and gait problem. Negative for myalgias.   Skin:  Negative for rash.   Neurological:  Positive for tremors (Chronic). Negative for dizziness, light-headedness and headaches.        VITALS:  /68

## 2025-05-06 ENCOUNTER — OFFICE VISIT (OUTPATIENT)
Dept: PRIMARY CARE CLINIC | Age: 80
End: 2025-05-06

## 2025-05-06 VITALS
SYSTOLIC BLOOD PRESSURE: 112 MMHG | WEIGHT: 147 LBS | RESPIRATION RATE: 16 BRPM | BODY MASS INDEX: 22.35 KG/M2 | TEMPERATURE: 97.2 F | HEART RATE: 60 BPM | DIASTOLIC BLOOD PRESSURE: 68 MMHG | OXYGEN SATURATION: 97 %
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
## 2025-05-06 DIAGNOSIS — G20.C PRIMARY PARKINSONISM (HCC): ICD-10-CM

## 2025-05-06 DIAGNOSIS — R26.81 UNSTEADY GAIT: ICD-10-CM

## 2025-05-06 ASSESSMENT — ENCOUNTER SYMPTOMS
COUGH: 0
WHEEZING: 0
SHORTNESS OF BREATH: 0
DIARRHEA: 0
VOMITING: 0
NAUSEA: 0

## 2025-05-28 RX ORDER — ESCITALOPRAM OXALATE 10 MG/1
10 TABLET ORAL DAILY
Qty: 90 TABLET | Refills: 1 | Status: SHIPPED | OUTPATIENT
Start: 2025-05-28

## 2025-05-28 RX ORDER — LEVOTHYROXINE SODIUM 75 UG/1
75 TABLET ORAL DAILY
Qty: 90 TABLET | Refills: 0 | Status: SHIPPED | OUTPATIENT
Start: 2025-05-28

## 2025-05-28 NOTE — TELEPHONE ENCOUNTER
Medication:   Requested Prescriptions     Pending Prescriptions Disp Refills    escitalopram (LEXAPRO) 10 MG tablet [Pharmacy Med Name: ESCITALOPRAM 10 MG TABLET] 90 tablet 1     Sig: TAKE 1 TABLET BY MOUTH DAILY TAKE ONE TAB A DAY     Last Filled:  9.30.24    Last appt: 5/6/2025   Next appt: 8/7/2025    Last OARRS:        No data to display

## 2025-05-28 NOTE — TELEPHONE ENCOUNTER
Medication:   Requested Prescriptions     Pending Prescriptions Disp Refills    levothyroxine (SYNTHROID) 75 MCG tablet [Pharmacy Med Name: LEVOTHYROXINE 75 MCG TABLET] 90 tablet 0     Sig: TAKE 1 TABLET BY MOUTH EVERY DAY     Last Filled:  12/26/2024    Last appt: 5/6/2025   Next appt: 5/28/2025    Last Lipid:   Lab Results   Component Value Date/Time    CHOL 115 01/15/2025 12:09 PM    TRIG 48 01/15/2025 12:09 PM    HDL 54 01/15/2025 12:09 PM    HDL 40 12/06/2011 09:16 AM

## 2025-06-03 ENCOUNTER — PATIENT MESSAGE (OUTPATIENT)
Dept: PRIMARY CARE CLINIC | Age: 80
End: 2025-06-03

## 2025-07-29 ENCOUNTER — CARE COORDINATION (OUTPATIENT)
Dept: CARE COORDINATION | Age: 80
End: 2025-07-29

## 2025-07-29 NOTE — CARE COORDINATION
Ambulatory Care Coordination Note     2025 3:27 PM     Patient Current Location:  Home: [address]  [place] [address]     This patient was received as a referral from [facility] health report .    ACM contacted the patient by telephone. Verified name and  with patient as identifiers. Provided introduction to self, and explanation of the ACM role.   Patient declined care management services at this time.          ACM: Liliane Woodard RN     Challenges to be reviewed by the provider   Additional needs identified to be addressed with provider No                 Method of communication with provider: none.    Utilization: Initial Call - N/A    Care Summary Note: ACM spoke with patient who reported he was doing well.  No additional falls.  Pt is using his RW for mobility support.  Pt reports that his BS has been well controled with an A1c of 5.4.  Pt reports that he checks his BS 3-4x/wk. Pt reports that he avoids fats and sweets.  Patient's wife is his support, she assists him as needed and helps coordinate his appointments and other needs 2/2 his Parkinson's.  Pt reports he has not driven since Dec 2024, his wife transports and attends all appointments. Pt reported he has everything he needs and declined need for further CM support.  Patient denied cp, sob, cough, dizziness, headache, n/v, diarrhea, abdominal pains, fever, or chills. Patient reported that he is taking all medications as ordered. Patient denied any other needs at this time. Patient instructed to continue to monitor s/s, reporting any that may present to MD immediately for early intervention. Educated on the use of urgent care or physician’s 24 hr access line if assistance is needed after hours.     PCP/Specialist follow up:   Future Appointments         Provider Specialty Dept Phone    2025 11:00 AM Errol Avila MD Primary Care [phone]            Follow Up:   No further Ambulatory Care Management follow-up scheduled at this

## 2025-08-07 ENCOUNTER — OFFICE VISIT (OUTPATIENT)
Dept: PRIMARY CARE CLINIC | Age: 80
End: 2025-08-07
Payer: MEDICARE

## 2025-08-07 VITALS — SYSTOLIC BLOOD PRESSURE: 160 MMHG | OXYGEN SATURATION: 95 % | HEART RATE: 58 BPM | DIASTOLIC BLOOD PRESSURE: 98 MMHG

## 2025-08-07 DIAGNOSIS — E11.22 TYPE 2 DIABETES MELLITUS WITH STAGE 3B CHRONIC KIDNEY DISEASE, WITHOUT LONG-TERM CURRENT USE OF INSULIN (HCC): ICD-10-CM

## 2025-08-07 DIAGNOSIS — N18.32 TYPE 2 DIABETES MELLITUS WITH STAGE 3B CHRONIC KIDNEY DISEASE, WITHOUT LONG-TERM CURRENT USE OF INSULIN (HCC): ICD-10-CM

## 2025-08-07 DIAGNOSIS — G20.C PRIMARY PARKINSONISM (HCC): ICD-10-CM

## 2025-08-07 DIAGNOSIS — I10 HYPERTENSION, UNSPECIFIED TYPE: Primary | ICD-10-CM

## 2025-08-07 DIAGNOSIS — R26.81 UNSTEADY GAIT: ICD-10-CM

## 2025-08-07 PROCEDURE — 3044F HG A1C LEVEL LT 7.0%: CPT | Performed by: FAMILY MEDICINE

## 2025-08-07 PROCEDURE — 1159F MED LIST DOCD IN RCRD: CPT | Performed by: FAMILY MEDICINE

## 2025-08-07 PROCEDURE — 3077F SYST BP >= 140 MM HG: CPT | Performed by: FAMILY MEDICINE

## 2025-08-07 PROCEDURE — 1123F ACP DISCUSS/DSCN MKR DOCD: CPT | Performed by: FAMILY MEDICINE

## 2025-08-07 PROCEDURE — 99214 OFFICE O/P EST MOD 30 MIN: CPT | Performed by: FAMILY MEDICINE

## 2025-08-07 PROCEDURE — 3080F DIAST BP >= 90 MM HG: CPT | Performed by: FAMILY MEDICINE

## 2025-08-07 RX ORDER — LISINOPRIL 5 MG/1
5 TABLET ORAL DAILY
Qty: 30 TABLET | Refills: 2 | Status: SHIPPED | OUTPATIENT
Start: 2025-08-07

## 2025-08-07 ASSESSMENT — ENCOUNTER SYMPTOMS
EYES NEGATIVE: 1
GASTROINTESTINAL NEGATIVE: 1
RESPIRATORY NEGATIVE: 1

## 2025-08-26 RX ORDER — LEVOTHYROXINE SODIUM 75 UG/1
75 TABLET ORAL DAILY
Qty: 90 TABLET | Refills: 0 | Status: SHIPPED | OUTPATIENT
Start: 2025-08-26

## 2025-08-29 RX ORDER — LISINOPRIL 5 MG/1
5 TABLET ORAL DAILY
Qty: 90 TABLET | Refills: 1 | Status: SHIPPED | OUTPATIENT
Start: 2025-08-29

## 2025-09-04 ENCOUNTER — PATIENT MESSAGE (OUTPATIENT)
Dept: PRIMARY CARE CLINIC | Age: 80
End: 2025-09-04